# Patient Record
Sex: FEMALE | Race: WHITE
[De-identification: names, ages, dates, MRNs, and addresses within clinical notes are randomized per-mention and may not be internally consistent; named-entity substitution may affect disease eponyms.]

---

## 2018-07-25 ENCOUNTER — HOSPITAL ENCOUNTER (OUTPATIENT)
Dept: HOSPITAL 45 - C.ACU | Age: 73
Setting detail: OBSERVATION
LOS: 1 days | Discharge: HOME | End: 2018-07-26
Attending: INTERNAL MEDICINE | Admitting: INTERNAL MEDICINE
Payer: COMMERCIAL

## 2018-07-25 VITALS
SYSTOLIC BLOOD PRESSURE: 155 MMHG | DIASTOLIC BLOOD PRESSURE: 56 MMHG | OXYGEN SATURATION: 95 % | TEMPERATURE: 98.6 F | HEART RATE: 60 BPM

## 2018-07-25 VITALS
DIASTOLIC BLOOD PRESSURE: 69 MMHG | SYSTOLIC BLOOD PRESSURE: 186 MMHG | HEART RATE: 57 BPM | TEMPERATURE: 98.24 F | OXYGEN SATURATION: 97 %

## 2018-07-25 VITALS
SYSTOLIC BLOOD PRESSURE: 157 MMHG | OXYGEN SATURATION: 95 % | DIASTOLIC BLOOD PRESSURE: 57 MMHG | TEMPERATURE: 97.52 F | HEART RATE: 61 BPM

## 2018-07-25 VITALS
BODY MASS INDEX: 19.69 KG/M2 | WEIGHT: 132.94 LBS | WEIGHT: 132.94 LBS | HEIGHT: 69.02 IN | BODY MASS INDEX: 19.69 KG/M2 | HEIGHT: 69.02 IN | BODY MASS INDEX: 19.69 KG/M2 | BODY MASS INDEX: 19.69 KG/M2

## 2018-07-25 VITALS — DIASTOLIC BLOOD PRESSURE: 99 MMHG | HEART RATE: 56 BPM | SYSTOLIC BLOOD PRESSURE: 171 MMHG | OXYGEN SATURATION: 98 %

## 2018-07-25 VITALS — OXYGEN SATURATION: 95 % | SYSTOLIC BLOOD PRESSURE: 177 MMHG | DIASTOLIC BLOOD PRESSURE: 86 MMHG | HEART RATE: 59 BPM

## 2018-07-25 VITALS — OXYGEN SATURATION: 97 % | DIASTOLIC BLOOD PRESSURE: 72 MMHG | SYSTOLIC BLOOD PRESSURE: 190 MMHG | HEART RATE: 56 BPM

## 2018-07-25 VITALS — DIASTOLIC BLOOD PRESSURE: 55 MMHG | SYSTOLIC BLOOD PRESSURE: 125 MMHG | TEMPERATURE: 98.42 F | HEART RATE: 70 BPM

## 2018-07-25 VITALS
SYSTOLIC BLOOD PRESSURE: 182 MMHG | TEMPERATURE: 98.42 F | OXYGEN SATURATION: 97 % | DIASTOLIC BLOOD PRESSURE: 68 MMHG | HEART RATE: 72 BPM

## 2018-07-25 VITALS — DIASTOLIC BLOOD PRESSURE: 67 MMHG | HEART RATE: 68 BPM | OXYGEN SATURATION: 98 % | SYSTOLIC BLOOD PRESSURE: 183 MMHG

## 2018-07-25 DIAGNOSIS — I25.10: ICD-10-CM

## 2018-07-25 DIAGNOSIS — I96: ICD-10-CM

## 2018-07-25 DIAGNOSIS — E03.9: ICD-10-CM

## 2018-07-25 DIAGNOSIS — Z87.891: ICD-10-CM

## 2018-07-25 DIAGNOSIS — I10: ICD-10-CM

## 2018-07-25 DIAGNOSIS — Z95.1: ICD-10-CM

## 2018-07-25 DIAGNOSIS — E11.40: ICD-10-CM

## 2018-07-25 DIAGNOSIS — Z79.01: ICD-10-CM

## 2018-07-25 DIAGNOSIS — E78.5: ICD-10-CM

## 2018-07-25 DIAGNOSIS — L97.419: ICD-10-CM

## 2018-07-25 DIAGNOSIS — L97.919: ICD-10-CM

## 2018-07-25 DIAGNOSIS — I73.9: Primary | ICD-10-CM

## 2018-07-25 DIAGNOSIS — F03.90: ICD-10-CM

## 2018-07-25 DIAGNOSIS — I48.91: ICD-10-CM

## 2018-07-25 RX ADMIN — CILOSTAZOL SCH MG: 100 TABLET ORAL at 21:42

## 2018-07-25 RX ADMIN — MEMANTINE HYDROCHLORIDE SCH MG: 10 TABLET ORAL at 21:41

## 2018-07-25 RX ADMIN — INSULIN ASPART SCH EA: 100 INJECTION, SOLUTION INTRAVENOUS; SUBCUTANEOUS at 19:15

## 2018-07-25 RX ADMIN — INSULIN ASPART SCH EA: 100 INJECTION, SOLUTION INTRAVENOUS; SUBCUTANEOUS at 21:00

## 2018-07-25 RX ADMIN — DOCUSATE SODIUM SCH MG: 100 CAPSULE, LIQUID FILLED ORAL at 21:40

## 2018-07-25 RX ADMIN — SULFAMETHOXAZOLE AND TRIMETHOPRIM SCH TAB: 800; 160 TABLET ORAL at 21:42

## 2018-07-25 NOTE — POST SEDATION ASSESSMENT
Post Sedation Assessment


General


Date of Sedation


Jul 25, 2018.


Vital Signs:





Vital Signs Past 12 Hours








  Date Time  Temp Pulse Resp B/P (MAP) Pulse Ox O2 Delivery O2 Flow Rate FiO2


 


7/25/18 08:04 36.9 72 18 182/68 (106) 97 Room Air  











Post Procedure Recovery Score


Activity:  (2) Moves 4 extremities *


Respiration:  (2) Deep breath/cough


Circulation:  (2) +/-20% PreAnes Value


Consciousness:  (2) Fully Awake


Oxygen Saturation:  (2) > 92% On Room Air





Discharge Sedation


Level of Care:  Fast Track Phase II





Post Sedation Plan


On clinical assessment, the patient appears to have tolerated the sedation 

without complications. Patient is recovering as anticipated.





Patient will continue to be monitored by nursing and may be discharged when 

sedation discharge criteria are met per below protocol. 





Upon Completions of procedure and additional 15 minutes continue every 5 minute 

vital signs and the P.A.R. score; then discharge to a Phase I or Fast Track to 

Phase II per the following guidelines:





* Discharge Patient to appropriate Phase II area if PAR is 8 or greater or 

return to pre- procedure baseline.  The post - procedure orders will be as 

directed. 





* If PAR score is less than 8 or not return to pre-procedure baseline then 

patient will follow Phase I monitoring till PAR is reached for Phase II.  The 

Phase I may be done in procedure room or may call to secure a Phase I area.





* If naloxone or flumazenil are used for reversal, hold in Phase I for an 

additional 60 -120 minutes before discharge to Phase II.  Please call the 

Sedation Physician to re-evaluate and complete post-note for discharge to Phase 

II area. 





Do NOT discharge from procedure sedation or Phase 1 until post- sedation 

evaluation note is complete by procedure /sedation MD





Sedation Discharge Instructions to be given to the patient at discharge to home.

## 2018-07-25 NOTE — PRE SEDATION ASSESSMENT
Pre Sedation Assessment


General


Date of Sedation:


Jul 25, 2018.





Review


Cardiovascular:  regular rate, rhythm, no edema


Lungs:  chest non-tender, lungs clear





Pre-Sedation Airway Assessment


Smoking Status:  Former Smoker


Hx of Sleep Apnea:  No


Hx of difficult intubation:  No


Short Thick Neck:  No


Thyro-mental Distance:  > 3 Finger Breadths


Oral Cavity:  WNL


Mallampati Classification:  Class II


ASA Classification:  Class III





Procedure Planning


Contraindications for Sedation:  None


Current Medications Reviewed:  Yes





Notes








The planned sedation has been discussed with the patient. Informed Consent was 

obtained.  I have identified the patient, determined the appropriateness of 

sedation and have assessed the patient immediately prior to the procedure.  





All medicine(s) and interventions are by my order.

## 2018-07-25 NOTE — [facility] POST OPERATIVE BRIEF NOTE
Immediate Operative Summary


Operative Date


Jul 25, 2018.





Pre-Operative Diagnosis





Peripheral arterial disease





Post-Operative Diagnosis





Peripheral arterial disease





Procedure(s) Performed





Bilateral LE angiogram





Mechanical Closure of Right Femoral Artery





Surgeon


Cam





Assistant Surgeon(s)


None





Estimated Blood Loss


10





Findings


Consistent with Post-Op Diagnosis





Specimens





None





Drains


None





Anesthesia Type


IV Sedat Cons RN Only





Complication(s)


none





Disposition


Disposition:  Recovery Room / PACU

## 2018-07-26 VITALS
DIASTOLIC BLOOD PRESSURE: 67 MMHG | TEMPERATURE: 98.42 F | HEART RATE: 68 BPM | SYSTOLIC BLOOD PRESSURE: 178 MMHG | OXYGEN SATURATION: 94 %

## 2018-07-26 VITALS — SYSTOLIC BLOOD PRESSURE: 130 MMHG | DIASTOLIC BLOOD PRESSURE: 47 MMHG | HEART RATE: 64 BPM | OXYGEN SATURATION: 95 %

## 2018-07-26 VITALS
TEMPERATURE: 98.24 F | SYSTOLIC BLOOD PRESSURE: 130 MMHG | DIASTOLIC BLOOD PRESSURE: 47 MMHG | HEART RATE: 64 BPM | OXYGEN SATURATION: 95 %

## 2018-07-26 VITALS
OXYGEN SATURATION: 95 % | DIASTOLIC BLOOD PRESSURE: 50 MMHG | SYSTOLIC BLOOD PRESSURE: 142 MMHG | HEART RATE: 64 BPM | TEMPERATURE: 97.7 F

## 2018-07-26 VITALS — HEART RATE: 61 BPM | DIASTOLIC BLOOD PRESSURE: 58 MMHG | SYSTOLIC BLOOD PRESSURE: 165 MMHG | OXYGEN SATURATION: 95 %

## 2018-07-26 VITALS — OXYGEN SATURATION: 94 %

## 2018-07-26 VITALS
HEART RATE: 64 BPM | SYSTOLIC BLOOD PRESSURE: 155 MMHG | OXYGEN SATURATION: 95 % | DIASTOLIC BLOOD PRESSURE: 54 MMHG | TEMPERATURE: 98.24 F

## 2018-07-26 VITALS — SYSTOLIC BLOOD PRESSURE: 151 MMHG | OXYGEN SATURATION: 94 % | HEART RATE: 64 BPM | DIASTOLIC BLOOD PRESSURE: 52 MMHG

## 2018-07-26 VITALS — SYSTOLIC BLOOD PRESSURE: 143 MMHG | OXYGEN SATURATION: 95 % | DIASTOLIC BLOOD PRESSURE: 51 MMHG

## 2018-07-26 VITALS — SYSTOLIC BLOOD PRESSURE: 203 MMHG | HEART RATE: 70 BPM | DIASTOLIC BLOOD PRESSURE: 68 MMHG

## 2018-07-26 VITALS — OXYGEN SATURATION: 95 %

## 2018-07-26 LAB
BUN SERPL-MCNC: 14 MG/DL (ref 7–18)
CALCIUM SERPL-MCNC: 8.4 MG/DL (ref 8.5–10.1)
CO2 SERPL-SCNC: 29 MMOL/L (ref 21–32)
CREAT SERPL-MCNC: 0.79 MG/DL (ref 0.6–1.2)
EOSINOPHIL NFR BLD AUTO: 307 K/UL (ref 130–400)
GLUCOSE SERPL-MCNC: 154 MG/DL (ref 70–99)
HBA1C MFR BLD: 9.2 % (ref 4.5–5.6)
HCT VFR BLD CALC: 33.4 % (ref 37–47)
HGB BLD-MCNC: 10.5 G/DL (ref 12–16)
MCH RBC QN AUTO: 28.3 PG (ref 25–34)
MCHC RBC AUTO-ENTMCNC: 31.4 G/DL (ref 32–36)
MCV RBC AUTO: 90 FL (ref 80–100)
PMV BLD AUTO: 9.7 FL (ref 7.4–10.4)
POTASSIUM SERPL-SCNC: 4.1 MMOL/L (ref 3.5–5.1)
RED CELL DISTRIBUTION WIDTH CV: 17.3 % (ref 11.5–14.5)
RED CELL DISTRIBUTION WIDTH SD: 57.5 FL (ref 36.4–46.3)
SODIUM SERPL-SCNC: 135 MMOL/L (ref 136–145)
WBC # BLD AUTO: 5.74 K/UL (ref 4.8–10.8)

## 2018-07-26 RX ADMIN — INSULIN ASPART SCH EA: 100 INJECTION, SOLUTION INTRAVENOUS; SUBCUTANEOUS at 07:13

## 2018-07-26 RX ADMIN — MEMANTINE HYDROCHLORIDE SCH MG: 10 TABLET ORAL at 10:40

## 2018-07-26 RX ADMIN — INSULIN ASPART SCH EA: 100 INJECTION, SOLUTION INTRAVENOUS; SUBCUTANEOUS at 00:03

## 2018-07-26 RX ADMIN — DOCUSATE SODIUM SCH MG: 100 CAPSULE, LIQUID FILLED ORAL at 10:42

## 2018-07-26 RX ADMIN — INSULIN ASPART SCH EA: 100 INJECTION, SOLUTION INTRAVENOUS; SUBCUTANEOUS at 11:06

## 2018-07-26 RX ADMIN — SULFAMETHOXAZOLE AND TRIMETHOPRIM SCH TAB: 800; 160 TABLET ORAL at 10:40

## 2018-07-26 RX ADMIN — INSULIN ASPART SCH EA: 100 INJECTION, SOLUTION INTRAVENOUS; SUBCUTANEOUS at 04:08

## 2018-07-26 RX ADMIN — CILOSTAZOL SCH MG: 100 TABLET ORAL at 10:41

## 2018-07-26 NOTE — CARDIOLOGY FOLLOW-UP
Subjective


Subjective


Date of Service:


Jul 26, 2018.


Pt evaluation today including:  conversation w/ patient, physical exam, chart 

review, lab review, review of studies, review of inpatient medication list


Additional Details:


Modest pain in right upper extremity at ulnar access site.


No other new complaints.





Review of Systems


Constitutional:  No fever


Respiratory:  No cough


Abdomen:  No pain


Heme:  No abnormal bleeding/bruising


Skin:  No rash





Objective


Vital Signs





Last Vital Signs Documentation








  Date Time  Temp Pulse Resp B/P (MAP) Pulse Ox O2 Delivery O2 Flow Rate FiO2


 


7/26/18 07:25     94 Room Air  


 


7/26/18 06:06  70  203/68 (113)    


 


7/26/18 04:10 36.9  16     


 


7/25/18 15:18       4 











Physical Exam:


General Appearance:  no apparent distress


ENT:  pharynx normal


Respiratory/Chest:  lungs clear, normal breath sounds, no respiratory distress


Cardiovascular:  regular rate, rhythm, no gallop, no murmur


Abdomen:  normal bowel sounds, non tender, soft


Extremities:  non-tender, + slow capillary refill


Neurologic/Psychiatric:  alert, oriented x 3


Skin:  normal color, no rash, + pertinent finding


Lymphatic:  no adenopathy





Assessment and Plan


1. Critical limb ischemia


2. Right lower extremity ulcerations


3. Insulin dependent diabetes


4. Hypertension.





Plan for right lower extremity endovascular intervention today.  





Likely home this afternoon.


Medications:





Current Inpatient Medications








 Medications


  (Trade)  Dose


 Ordered  Sig/Amparo


 Route  Start Time


 Stop Time Status Last Admin


Dose Admin


 


 Dextrose  1,000 ml @ 


 50 mls/hr  Q20H


 IV  7/25/18 14:00


 8/24/18 13:59  7/25/18 19:17


50 MLS/HR


 


 Acetaminophen


  (Tylenol Tab)  1,000 mg  Q6  PRN


 PO  7/25/18 16:45


 8/24/18 16:44   


 


 


 Aspirin


  (Ecotrin Tab)  81 mg  DAILY


 PO  7/26/18 09:00


 8/25/18 08:59   


 


 


 Cilostazol


  (Pletal Tab)  100 mg  BID


 PO  7/25/18 21:00


 8/24/18 20:59  7/25/18 21:42


100 MG


 


 Citalopram


 Hydrobromide


  (celeXA TAB)  20 mg  DAILY


 PO  7/26/18 09:00


 8/25/18 08:59   


 


 


 Docusate Sodium


  (coLACE CAP)  100 mg  BID


 PO  7/25/18 21:00


 8/24/18 20:59  7/25/18 21:40


100 MG


 


 Donepezil HCl


  (Aricept Tab)  10 mg  DAILY


 PO  7/26/18 09:00


 8/25/18 08:59   


 


 


 Levothyroxine


 Sodium


  (Synthroid Tab)  88 mcg  DAILYBB


 PO  7/26/18 06:00


 8/25/18 05:59  7/26/18 05:56


88 MCG


 


 Memantine


  (Namenda Tab)  10 mg  BID


 PO  7/25/18 21:00


 8/24/18 20:59  7/25/18 21:41


10 MG


 


 Metoprolol


 Succinate


  (Toprol Xl Tab)  25 mg  DAILY


 PO  7/26/18 09:00


 8/25/18 08:59   


 


 


 Multivitamins


  (Multivitamin


 Tab)  1 tab  DAILY


 PO  7/26/18 09:00


 8/25/18 08:59   


 


 


 Pantoprazole


 Sodium


  (Protonix Tab)  40 mg  DAILY


 PO  7/26/18 09:00


 8/25/18 08:59   


 


 


 Simvastatin


  (Zocor Tab)  20 mg  HS


 PO  7/25/18 21:00


 8/24/18 20:59  7/25/18 21:40


20 MG


 


 Trimethoprim/


 Sulfamethoxazole


  (Septra Ds 800/


 160MG Tab)  1 tab  BID


 PO  7/25/18 21:00


 8/4/18 20:59  7/25/18 21:42


1 TAB


 


 Glucose


  (Glucose 40% Gel)  15-30


 GRAMS 15


 GRAMS...  UD  PRN


 PO  7/25/18 16:45


 8/24/18 16:44   


 


 


 Glucose


  (Glucose Chew


 Tab)  4-8


 Tablets 4


 Tabl...  UD  PRN


 PO  7/25/18 16:45


 8/24/18 16:44   


 


 


 Dextrose


  (Dextrose 50%


 50ML Syringe)  25-50ML


 25ML FOR


 ...  UD  PRN


 IV  7/25/18 16:45


 8/24/18 16:44   


 


 


 Glucagon


  (Glucagon Inj)  1 mg  UD  PRN


 SQ  7/25/18 16:45


 8/24/18 16:44   


 


 


 Carbohydrates


  (Carbohydrates


 For Hypoglycemia)  15-30 GRAMS


 15 grams if


 BSG 54-69...  UD  PRN


 PO  7/25/18 16:45


 8/24/18 16:44   


 


 


 Miscellaneous


 Information


  (Consult


 Glycemic


 Management


 Pharmacy)  1 ea  UD  PRN


 N/A  7/25/18 17:16


 8/24/18 17:15   


 


 


 Insulin Aspart


  (novoLOG INSULIN


 PUMP)  1 ea  ACHS


 N/A  7/25/18 18:30


 8/24/18 18:29  7/26/18 07:13


1 EA


 


 Insulin Aspart


  (novoLOG ASPART)  SLIDING


 SCALE  PRN  PRN


 SC  7/25/18 18:45


 8/24/18 18:44   


 


 


 Miscellaneous


  (Iv Fluids


 Completed)  1 ea  PRN  PRN


 N/A  7/25/18 19:15


 7/25/19 19:14   


 








Lab Results:


7/26/18 05:54








7/26/18 05:54

















Test


  7/26/18


05:54 7/26/18


07:13


 


Red Blood Count


  3.71 M/uL


(4.2-5.4) 


 


 


Mean Corpuscular Volume


  90.0 fL


() 


 


 


Mean Corpuscular Hemoglobin


  28.3 pg


(25-34) 


 


 


Mean Corpuscular Hemoglobin


Concent 31.4 g/dl


(32-36) 


 


 


RDW Standard Deviation


  57.5 fL


(36.4-46.3) 


 


 


RDW Coefficient of Variation


  17.3 %


(11.5-14.5) 


 


 


Mean Platelet Volume


  9.7 fL


(7.4-10.4) 


 


 


Anion Gap


  5.0 mmol/L


(3-11) 


 


 


Est Creatinine Clear Calc


Drug Dose 58.8 ml/min 


  


 


 


Estimated GFR (


American) 84.9 


  


 


 


Estimated GFR (Non-


American 73.2 


  


 


 


BUN/Creatinine Ratio 18.0 (10-20)  


 


Estimated Average Glucose 217 mg/dl  


 


Hemoglobin A1c


  9.2 %


(4.5-5.6) 


 


 


Calcium Level


  8.4 mg/dl


(8.5-10.1) 


 


 


Bedside Glucose


  


  164 mg/dl


(70-90)

## 2018-07-26 NOTE — [facility] OPERATIVE REPORT
Operative Report


Operative Date


Jul 25, 2018.





Pre-Operative Diagnosis





Peripheral arterial disease





Post-Operative Diagnosis





Peripheral arterial disease





Procedure(s) Performed





Bilateral LE angiogram





Mechanical Closure of Right Femoral Artery





Surgeon


Cam





Assistant Surgeon(s)


None





Estimated Blood Loss


10





Findings


Left lower extremity:


Common iliac - Heavily calcified, mild disease


External iliac - Heavily calcified, mild disease


Internal iliac - Heavily calcified, mild disease


CFA - No significant stenosis at anastomosis with graft


Profunda - Heavily calcified, mild disease


SFA  - Occluded ostial to distal segment stents.


Femoral to anterior tibial graft patent.  Limited flow distal to anastomosis.  





Right lower extremity:


Common iliac - Heavily calcified, mild disease


External iliac - Heavily calcified, mild disease


Internal iliac - Heavily calcified, mild disease


CFA - Heavily calcified, mild disease


Profunda - Heavily calcified, mild disease


SFA  - Small vessel, heavily calcified with severe focal distal disease (90%)


Popliteal - Heavily calcified with 95% mid stenosis.


TPT - Heavily calcified with mild diffuse disease


AT - Occluded proximally


PT - Severe diffuse disease, tapering/occluded in the mid segment


Peroneal - Patent to the foot with mild diffuse disease





Specimens





None





Drains


None





Anesthesia Type


IV Sedat Cons RN Only





Complication(s)


none





Disposition


Recovery Room / PACU





Description of Procedure


Ultrasound guided access of right ulnar artery with 5Fr sheath


Able to pass 0.35 wire to aorta but could not pass 4Fr catheter into brachial 

artery


Converted to right CFA approach, 5Fr sheath placed.


Aorta angiogram, bilateral iliac angiograms completed with pigtail catheter


RIM catheter placed into external iliac to visualize LLE iliac


RLE angiograms via right CFA sheath.





Right CFA closure with Mynx device.





Contrast: 50 cc





Summary:


1. Severe right lower extremity distal SFA, popliteal disease.  Single vessel 

peroneal run-off to the foot.  


2. Patent left lower extremity femoral to AT bypass graft with severe distal 

disease post anastomosis.


I attest to the content of the Intraoperative Record and any orders documented 

therein.  Any exceptions are noted below.

## 2018-07-26 NOTE — DISCHARGE INSTRUCTIONS
Discharge Instructions


Procedure


Procedure Date:


Jul 26, 2018.


Reason for Visit:


PAD.





Discharge


Discharge Date:


Jul 26, 2018.


Discharge Diagnosis:


Peripheral artery disease


Last Recorded Wt (Kilograms):  60.300





Anesthesia


Post Anesthesia Instructions:


If you have had General Anesthesia or IV Sedation:





*  Do not drive today.





*  Resume driving when surgeon permits.





*  Do not make important decisions or sign legal documents today.





*  Call surgeon for:


   1.  Temperature elevations greater than 101 degrees F.


   2.  Uncontrollable pain.


   3.  Excessive bleeding.


   4.  Persistent nausea and vomiting.


   5.  Medication intolerance (nausea, vomiting or rash).





*  For nausea and vomiting use only clear liquids such as: tea, soda, bouillon 

until


   nausea subsides, then gradually increase diet as tolerated.





*  If you have any concerns or questions, call your surgeon's office.  If 

physician is 


   unavailable and it is an emergency, call 911 or go to the nearest emergency 

room.





Instructions


Activity Recommendations:  limitations as noted below


Recommended Home Diet:  resume previous diet


Allergies:  


Coded Allergies:  


     Penicillins (Unverified  Allergy, Mild, SHORTNESS OF BREATH, 7/25/18)


     Clopidogrel (Unverified  Allergy, Unknown, RASH, 7/25/18)





Follow Up


Additional Instructions:


ACTIVITY RECOMMENDATIONS:





It is common to feel weak and fatigue for a few days.





*  Do not drive or operate any motorized equipment for the next


    three days.





*  Limit stair usage (2 or 3 trips a day only) for the next three days.





*  Do not lift anything heavier than 10 pounds for the next three


    days.





*  Do not engage in vigorous exercise or any sports for the next


    five days.





*  You may shower the day after your procedure, but do not 


    immerse the area for three days.  Cleanse the site gently with


    soap and water.








SPECIAL CARE INSTRUCTIONS:





* You may replace the pressure dressing or band-aid the morning 


after the procedure.





* After your procedure, it is normal to have a small bruise or small lump


at the site.  Examine your site daily for any change in the bruise or lump,


redness, swelling, drainage or numbness.  


Notify your doctor if any change.





BLEEDING:





* If there is a small amount of bleeding at the site, lie down and apply


firm pressure with a clean cloth for ten minutes.  When the bleeding stops,


lie quietly keeping the procedure limb straight for six hours.  


Notify your doctor as soon as possible.





* If the bleeding does not stop after ten minutes or if there is a large


amount of bleeding or spurting, call 911 immediately.  Continue to lie 


down and hold firm pressure until help arrives.





SKIN IRRITATION:





*  You may experience some redness and/or swelling in the area where radiation 

was


administered.  If any skin irritation occurs, please contact your family 

physician.








FOLLOW UP VISIT:





Keep any scheduled doctor appointments.


Follow-up with:


Dr. Levy 3-4 weeks.





Continue to follow with wound clinic





[facility] Recommendations:


 


Call your doctor if:





*  Temperature above 101 degrees


*  Pain not relieved by pain medicine ordered


*  There is increased drainage or redness from any incision


*  You have any unanswered questions or concerns.





Your Doctors Instructions noted above were prepared by provider Irving Levy.


Patient Signature Section:





 Patient Instructions Signature Page














Francisca Reyes 











Patient (or Guardian) Signature/Date:____________________________________ I 

have read and understand the instructions given to me by my caregivers.








Caregiver/RN/Doctor Signature/Date:____________________________________











The above-named patient and/or guardian has received patient instructions on 

this date.





























+  Original Patient Signature Page (only) stays with chart.  Please make copy 

for patient.

## 2018-07-26 NOTE — POST SEDATION ASSESSMENT
Post Sedation Assessment


General


Date of Sedation


Jul 26, 2018.


Vital Signs:





Vital Signs Past 12 Hours








  Date Time  Temp Pulse Resp B/P (MAP) Pulse Ox O2 Delivery O2 Flow Rate FiO2


 


7/26/18 07:25     94 Room Air  


 


7/26/18 06:06  70  203/68 (113)    


 


7/26/18 04:10 36.9 68 16 178/67 (104) 94 Room Air  


 


7/25/18 23:59      Room Air  


 


7/25/18 23:38 36.9 70 16 125/55 (78)    











Post Procedure Recovery Score


Activity:  (2) Moves 4 extremities *


Respiration:  (2) Deep breath/cough


Circulation:  (2) +/-20% PreAnes Value


Consciousness:  (2) Fully Awake


Oxygen Saturation:  (2) > 92% On Room Air


Post Anesthesia Score:  10





Discharge Sedation


Level of Care:  Fast Track Phase II





Post Sedation Plan


On clinical assessment, the patient appears to have tolerated the sedation 

without complications. Patient is recovering as anticipated.





Patient will continue to be monitored by nursing and may be discharged when 

sedation discharge criteria are met per below protocol. 





Upon Completions of procedure and additional 15 minutes continue every 5 minute 

vital signs and the P.A.R. score; then discharge to a Phase I or Fast Track to 

Phase II per the following guidelines:





* Discharge Patient to appropriate Phase II area if PAR is 8 or greater or 

return to pre- procedure baseline.  The post - procedure orders will be as 

directed. 





* If PAR score is less than 8 or not return to pre-procedure baseline then 

patient will follow Phase I monitoring till PAR is reached for Phase II.  The 

Phase I may be done in procedure room or may call to secure a Phase I area.





* If naloxone or flumazenil are used for reversal, hold in Phase I for an 

additional 60 -120 minutes before discharge to Phase II.  Please call the 

Sedation Physician to re-evaluate and complete post-note for discharge to Phase 

II area. 





Do NOT discharge from procedure sedation or Phase 1 until post- sedation 

evaluation note is complete by procedure /sedation MD





Sedation Discharge Instructions to be given to the patient at discharge to home.

## 2018-07-26 NOTE — [facility] POST OPERATIVE BRIEF NOTE
Immediate Operative Summary


Operative Date


Jul 26, 2018.





Pre-Operative Diagnosis





Peripheral Artery Disease





Post-Operative Diagnosis





Peripheral Artery Disease





Procedure(s) Performed





Right Lower Extremity Angiogram, Percutaneous Transluminal Angioplasty of 

Distal 


SFA and Popliteal, Mechanical Closure of Right Femoral Artery, Moderate 

Sedation 


From 0838 to





Surgeon


Dr. Levy





Assistant Surgeon(s)


None





Estimated Blood Loss


22





Findings


Consistent with Post-Op Diagnosis





Specimens





None





Drains


None





Anesthesia Type


IV Sedat Cons RN Only





Complication(s)


none





Disposition


Accompanied Pt To Recover:  no


Disposition:  PCU

## 2018-07-26 NOTE — PRE SEDATION ASSESSMENT
Pre Sedation Assessment


General


Date of Sedation:


Jul 26, 2018.





Vital Signs Past 12 Hours








  Date Time  Temp Pulse Resp B/P (MAP) Pulse Ox O2 Delivery O2 Flow Rate FiO2


 


7/26/18 07:25     94 Room Air  


 


7/26/18 06:06  70  203/68 (113)    


 


7/26/18 04:10 36.9 68 16 178/67 (104) 94 Room Air  


 


7/25/18 23:59      Room Air  


 


7/25/18 23:38 36.9 70 16 125/55 (78)    











Review


Cardiovascular:  regular rate, rhythm, no edema


Lungs:  chest non-tender, lungs clear





Pre-Sedation Airway Assessment


Smoking Status:  Former Smoker


Hx of Sleep Apnea:  No


Hx of difficult intubation:  No


Short Thick Neck:  No


Thyro-mental Distance:  > 3 Finger Breadths


Oral Cavity:  Dentures


Mallampati Classification:  Class I


ASA Classification:  Class II





NPO Status


Date of Last Intake of Fluids:  Jul 25, 2018


Time of Last Intake of Fluids:  2345


Date of Last Intake of Solids:  Jul 25, 2018


Time of Last Intake of Solids:  2200





Procedure Planning


Contraindications for Sedation:  None


Current Medications Reviewed:  Yes





Notes








The planned sedation has been discussed with the patient. Informed Consent was 

obtained.  I have identified the patient, determined the appropriateness of 

sedation and have assessed the patient immediately prior to the procedure.  





All medicine(s) and interventions are by my order.

## 2018-08-02 ENCOUNTER — HOSPITAL ENCOUNTER (INPATIENT)
Dept: HOSPITAL 45 - C.EDB | Age: 73
LOS: 17 days | Discharge: HOME | DRG: 256 | End: 2018-08-19
Attending: HOSPITALIST | Admitting: HOSPITALIST
Payer: COMMERCIAL

## 2018-08-02 VITALS
HEART RATE: 63 BPM | DIASTOLIC BLOOD PRESSURE: 54 MMHG | OXYGEN SATURATION: 93 % | SYSTOLIC BLOOD PRESSURE: 149 MMHG | TEMPERATURE: 98.06 F

## 2018-08-02 VITALS
HEIGHT: 69 IN | WEIGHT: 138.89 LBS | WEIGHT: 138.89 LBS | HEIGHT: 69 IN | BODY MASS INDEX: 20.57 KG/M2 | BODY MASS INDEX: 20.57 KG/M2

## 2018-08-02 VITALS — HEART RATE: 64 BPM | TEMPERATURE: 98.06 F | DIASTOLIC BLOOD PRESSURE: 65 MMHG | SYSTOLIC BLOOD PRESSURE: 183 MMHG

## 2018-08-02 DIAGNOSIS — Z79.899: ICD-10-CM

## 2018-08-02 DIAGNOSIS — Z95.1: ICD-10-CM

## 2018-08-02 DIAGNOSIS — Z86.14: ICD-10-CM

## 2018-08-02 DIAGNOSIS — F01.50: ICD-10-CM

## 2018-08-02 DIAGNOSIS — E10.51: ICD-10-CM

## 2018-08-02 DIAGNOSIS — G47.33: ICD-10-CM

## 2018-08-02 DIAGNOSIS — E10.69: ICD-10-CM

## 2018-08-02 DIAGNOSIS — E10.42: ICD-10-CM

## 2018-08-02 DIAGNOSIS — Z89.412: ICD-10-CM

## 2018-08-02 DIAGNOSIS — E03.9: ICD-10-CM

## 2018-08-02 DIAGNOSIS — I25.10: ICD-10-CM

## 2018-08-02 DIAGNOSIS — E10.22: ICD-10-CM

## 2018-08-02 DIAGNOSIS — I48.0: ICD-10-CM

## 2018-08-02 DIAGNOSIS — E10.649: ICD-10-CM

## 2018-08-02 DIAGNOSIS — R91.8: ICD-10-CM

## 2018-08-02 DIAGNOSIS — L97.419: ICD-10-CM

## 2018-08-02 DIAGNOSIS — Z79.01: ICD-10-CM

## 2018-08-02 DIAGNOSIS — Z79.82: ICD-10-CM

## 2018-08-02 DIAGNOSIS — Z88.0: ICD-10-CM

## 2018-08-02 DIAGNOSIS — Z16.24: ICD-10-CM

## 2018-08-02 DIAGNOSIS — Z88.8: ICD-10-CM

## 2018-08-02 DIAGNOSIS — Z98.62: ICD-10-CM

## 2018-08-02 DIAGNOSIS — K21.9: ICD-10-CM

## 2018-08-02 DIAGNOSIS — M86.171: ICD-10-CM

## 2018-08-02 DIAGNOSIS — L97.514: ICD-10-CM

## 2018-08-02 DIAGNOSIS — Z66: ICD-10-CM

## 2018-08-02 DIAGNOSIS — B96.5: ICD-10-CM

## 2018-08-02 DIAGNOSIS — E10.65: ICD-10-CM

## 2018-08-02 DIAGNOSIS — E10.52: Primary | ICD-10-CM

## 2018-08-02 DIAGNOSIS — E10.621: ICD-10-CM

## 2018-08-02 DIAGNOSIS — Z79.02: ICD-10-CM

## 2018-08-02 DIAGNOSIS — N18.3: ICD-10-CM

## 2018-08-02 DIAGNOSIS — I48.92: ICD-10-CM

## 2018-08-02 DIAGNOSIS — E78.5: ICD-10-CM

## 2018-08-02 DIAGNOSIS — Z82.49: ICD-10-CM

## 2018-08-02 DIAGNOSIS — Z87.891: ICD-10-CM

## 2018-08-02 DIAGNOSIS — B95.62: ICD-10-CM

## 2018-08-02 DIAGNOSIS — L03.115: ICD-10-CM

## 2018-08-02 DIAGNOSIS — Z79.2: ICD-10-CM

## 2018-08-02 DIAGNOSIS — I12.9: ICD-10-CM

## 2018-08-02 LAB
ALBUMIN SERPL-MCNC: 2.5 GM/DL (ref 3.4–5)
ALP SERPL-CCNC: 154 U/L (ref 45–117)
ALT SERPL-CCNC: 25 U/L (ref 12–78)
AST SERPL-CCNC: 24 U/L (ref 15–37)
BASOPHILS # BLD: 0.04 K/UL (ref 0–0.2)
BASOPHILS NFR BLD: 0.6 %
BUN SERPL-MCNC: 25 MG/DL (ref 7–18)
CALCIUM SERPL-MCNC: 9 MG/DL (ref 8.5–10.1)
CO2 SERPL-SCNC: 32 MMOL/L (ref 21–32)
CREAT SERPL-MCNC: 1.1 MG/DL (ref 0.6–1.2)
EOS ABS #: 0.16 K/UL (ref 0–0.5)
EOSINOPHIL NFR BLD AUTO: 364 K/UL (ref 130–400)
GLUCOSE SERPL-MCNC: 386 MG/DL (ref 70–99)
HCT VFR BLD CALC: 31.9 % (ref 37–47)
HGB BLD-MCNC: 10.4 G/DL (ref 12–16)
IG#: 0.04 K/UL (ref 0–0.02)
IMM GRANULOCYTES NFR BLD AUTO: 18.7 %
INR PPP: 1.1 (ref 0.9–1.1)
LYMPHOCYTES # BLD: 1.34 K/UL (ref 1.2–3.4)
MCH RBC QN AUTO: 29.9 PG (ref 25–34)
MCHC RBC AUTO-ENTMCNC: 32.6 G/DL (ref 32–36)
MCV RBC AUTO: 91.7 FL (ref 80–100)
MONO ABS #: 0.72 K/UL (ref 0.11–0.59)
MONOCYTES NFR BLD: 10.1 %
NEUT ABS #: 4.85 K/UL (ref 1.4–6.5)
NEUTROPHILS # BLD AUTO: 2.2 %
NEUTROPHILS NFR BLD AUTO: 67.8 %
PMV BLD AUTO: 10.1 FL (ref 7.4–10.4)
POTASSIUM SERPL-SCNC: 4.8 MMOL/L (ref 3.5–5.1)
PROT SERPL-MCNC: 7 GM/DL (ref 6.4–8.2)
PTT PATIENT: 30.3 SECONDS (ref 21–31)
RED CELL DISTRIBUTION WIDTH CV: 17.9 % (ref 11.5–14.5)
RED CELL DISTRIBUTION WIDTH SD: 59.8 FL (ref 36.4–46.3)
SODIUM SERPL-SCNC: 133 MMOL/L (ref 136–145)
WBC # BLD AUTO: 7.15 K/UL (ref 4.8–10.8)

## 2018-08-02 RX ADMIN — DAPTOMYCIN SCH MLS/MIN: 50 INJECTION, POWDER, LYOPHILIZED, FOR SOLUTION INTRAVENOUS at 23:11

## 2018-08-02 RX ADMIN — CILOSTAZOL SCH MG: 100 TABLET ORAL at 21:55

## 2018-08-02 RX ADMIN — INSULIN ASPART SCH UNITS: 100 INJECTION, SOLUTION INTRAVENOUS; SUBCUTANEOUS at 21:50

## 2018-08-02 RX ADMIN — ROSUVASTATIN CALCIUM SCH MG: 20 TABLET, FILM COATED ORAL at 21:53

## 2018-08-02 RX ADMIN — DOCUSATE SODIUM SCH MG: 100 CAPSULE, LIQUID FILLED ORAL at 21:53

## 2018-08-02 RX ADMIN — METOPROLOL TARTRATE SCH MG: 25 TABLET, FILM COATED ORAL at 21:54

## 2018-08-02 RX ADMIN — MEMANTINE HYDROCHLORIDE SCH MG: 10 TABLET ORAL at 21:54

## 2018-08-02 RX ADMIN — MIRTAZAPINE SCH MG: 15 TABLET, FILM COATED ORAL at 21:55

## 2018-08-02 RX ADMIN — INSULIN ASPART SCH EA: 100 INJECTION, SOLUTION INTRAVENOUS; SUBCUTANEOUS at 21:00

## 2018-08-02 RX ADMIN — APIXABAN SCH MG: 5 TABLET, FILM COATED ORAL at 21:54

## 2018-08-02 NOTE — HISTORY AND PHYSICAL
History & Physical


Date & Time of Service:


Aug 2, 2018 at 18:19


Chief Complaint:


Infected Foot


Primary Care Physician:


Jhonathan Mallory M.D.


History of Present Illness


Source:  patient, family (Daughter Rupinder)


This is a 73-year-old female with a significant past medical history of type 1 

insulin-dependent diabetes mellitus, CAD status post prior CABG, PAD status 

post recent revascularization, hypothyroidism, hypertension, hyperlipidemia, PAF

, diabetic polyneuropathy, GERD, CKD stage III who presents to [facility] where she was referred from ID Dr. Stone secondary to MDR wound 

culture requiring IV antibiotics.  Patient was initially hospitalized in March 2018 requiring complex fem-tibial bypass.  Most recently hospitalized on 7/26/ 2018 due to bilateral lower extremity angiogram with right SFA/popliteal 

angioplasty with drug-eluting balloon by Dr. Levy which was successful.  She 

has chronic right heel wound, great toe necrosis which warranted 

revascularization prior to possible further procedures per daughter.  She 

follows closely with infectious disease and wound clinic.  Wound culture of 

right heel grew positive MRSA, multidrug resistant Pseudomonas requiring IV 

antibiotic therapy, therefore she is referred for admission.  Daughter is 

present, provides most of history.  Currently patient denies pain, fever, chills

, sweats, chest pain, shortness of breath, dizziness, lightheadedness, nausea, 

vomiting, diarrhea, UTI sx.  Overall good appetite, in which she takes remeron 

for.  Per daughter patient has been having elevated blood sugars secondary to 

frequent hospitalizations, infections.  She states "I just think she needs this 

toe off and be done with it."  In ED initial evaluation revealed WBC 7.15, 

hemoglobin 10.4, hematocrit 31.9, platelets 264, sodium 133, potassium 4.8, 

chloride 96, BUN/creatinine 25, 1.10, glucose 386.  She was given a dose of IV 

Vanco, IV gentamicin, 10 units of IV regular insulin secondary to 

hyperglycemia.  ESR, CRP elevated at 79 and 4.8 respectively.  Her lactic acid 

was 1.5.  CT scan right foot revealed soft tissue edema, no evidence of 

osteomyelitis.  She is being admitted for IV antibiotic therapy.





Past Medical/Surgical History


Medical Problems:


(1) Atherosclerotic dementia


Status: Chronic  





(2) CAD (coronary artery disease)


Status: Chronic  





(3) Charcot foot due to diabetes mellitus


Status: Chronic  





(4) Diabetic peripheral neuropathy associated with type 1 diabetes mellitus


Status: Chronic  





(5) Diabetic polyneuropathy


Status: Chronic  





(6) GERD (gastroesophageal reflux disease)


Status: Chronic  





(7) History of diabetic ulcer of foot


Status: Chronic  





(8) Hyperlipidemia


Status: Chronic  





(9) Hypertension


Status: Chronic  





(10) Hypothyroid


Status: Chronic  





(11) Paroxysmal atrial fibrillation


Status: Chronic  





(12) Peripheral vascular angioplasty status


Permanent Comment: Right SFA/popliteal angioplasty with drug-eluting balloon 7/ 26/18


Status: Chronic  





(13) Severe peripheral arterial disease


Status: Chronic  





(14) Status post partial amputation of foot


Status: Chronic  





(15) Type 1 diabetes mellitus


Status: Chronic  





(16) Type 1 DM with CKD stage 3 and hypertension


Status: Chronic  





Surgical Problems:


(1) Amputated great toe of left foot


Permanent Comment: left great, MRSA Osteo 2/2010


Status: Chronic  





(2) Hx of appendectomy


Status: Chronic  





(3) Hx of CABG


Permanent Comment: 2012


Status: Chronic  





(4) S/P laparoscopic hysterectomy


Status: Chronic  





(5) Status post femorotibial bypass


Status: Chronic  








Family History





Bone cancer


  MOTHER


FH: COPD (chronic obstructive pulmonary disease)


  FATHER


Peripheral arterial disease


  FATHER





Social History


Smoking Status:  Former Smoker (2 packs per day for 30 years, quit in 1981)


Smokeless Tobacco Use:  No


Alcohol Use:  none


Drug Use:  none


Marital Status:  single, 


Housing status:  lives with family (Her daughter Rupinder)


Occupational Status:  retired





Immunizations


History of Influenza Vaccine:  Unknown


History of Tetanus Vaccine?:  Yes


Tetanus Immunization Date:  Mar 27, 2014


History of Pneumococcal:  Yes


Pneumococcal Date:  May 26, 2016





Allergies


Coded Allergies:  


     Penicillins (Unverified  Allergy, Mild, SHORTNESS OF BREATH, 8/2/18)


     Clopidogrel (Unverified  Allergy, Unknown, RASH, 8/2/18)





Home Medications


Scheduled


Amlodipine (Norvasc), 2.5 MG PO DAILY


Apixaban (Eliquis), 5 MG PO BID


Aspirin (Aspirin Ec), 81 MG PO DAILY


Calcium Carbonate-Vitamin D (Calcium + D), 1 TAB PO DAILY


Cilostazol (Pletal), 1 TAB PO BID


Citalopram Hydrobromide (Celexa), 20 MG PO DAILY


Docusate Sodium (Colace), 1 CAP PO BID


Donepezil Hydrochloride (Aricept), 10 MG PO DAILY


Furosemide (Lasix), 1 TAB PO DAILY


Insulin Aspart (novoLOG INSULIN PUMP ), 1 EA N/A UD


Levothyroxine Sodium (Synthroid), 88 MCG PO DAILY


Memantine Hcl (Namenda), 10 MG PO BID


Metoprolol Tartrate (Lopressor), 25 MG PO BID


Mirtazapine (Remeron), 0.5 TAB PO HS


Multiple Vitamin (Multivitamin), 1 TAB PO DAILY


Pantoprazole (Protonix), 40 MG PO DAILY


Polyethylene Glycol 3350 (Miralax), 17 GM PO DAILY


Sulfa/Trimethoprim (Bactrim Ds 800MG/160MG), 1 TAB PO BID





Scheduled PRN


Acetaminophen (Tylenol), 1,000 MG PO Q6 PRN for Pain





Miscellaneous Medications


Rosuvastatin Calcium (Crestor), 20





Review of Systems


As noted per HPI, 10 systems reviewed and negative unless noted above.





Physical Exam


Vital Signs











  Date Time  Temp Pulse Resp B/P (MAP) Pulse Ox O2 Delivery O2 Flow Rate FiO2


 


8/2/18 15:53 36.8 64  208/62 96 Room Air  





    174/117    


 


8/2/18 14:05 36.8 69 20 146/84 92 Room Air  








General Appearance:  no apparent distress, + thin (F, appears age, lying in bed)


Head:  normocephalic, atraumatic


Eyes:  normal inspection, PERRL


ENT:  normal ENT inspection, hearing grossly normal, + pertinent finding (

Mucous membranes moist)


Neck:  supple, no adenopathy


Respiratory/Chest:  chest non-tender, lungs clear, normal breath sounds, no 

respiratory distress, no accessory muscle use


Cardiovascular:  regular rate, rhythm, + systolic murmur (2/6 holosystolic 

heard best at apex, radiates to carotid), + abnormal peripheral pulses (

Diminished pedal pulses and PT pulses R>L, )


Abdomen/GI:  normal bowel sounds, non tender, soft, no organomegaly


Genitourinary - Female:  + pertinent finding (Left inginual scar from skin/

muscle graft healed; right inguinal vascular access site, CDI no erythema)


Back:  normal inspection, normal range of motion


Extremities/Musculoskelatal:  + pedal edema (Right with warmth to touch, mild 

erythema), + pertinent finding (+Right heel ulcer with treatment intact, R 

necrotic great toe down to base, with minimal serosanginous drainage at base.)


Neurologic/Psych:  alert, normal mood/affect, oriented x 3 (to basics only)


Skin:  normal color, no rash





Diagnostics


Laboratory Results





Results Past 24 Hours








Test


  8/2/18


15:45 8/2/18


16:35 Range/Units


 


 


White Blood Count 7.15  4.8-10.8  K/uL


 


Red Blood Count 3.48  4.2-5.4  M/uL


 


Hemoglobin 10.4  12.0-16.0  g/dL


 


Hematocrit 31.9  37-47  %


 


Mean Corpuscular Volume 91.7    fL


 


Mean Corpuscular Hemoglobin 29.9  25-34  pg


 


Mean Corpuscular Hemoglobin


Concent 32.6


  


  32-36  g/dl


 


 


Platelet Count 364  130-400  K/uL


 


Mean Platelet Volume 10.1  7.4-10.4  fL


 


Neutrophils (%) (Auto) 67.8   %


 


Lymphocytes (%) (Auto) 18.7   %


 


Monocytes (%) (Auto) 10.1   %


 


Eosinophils (%) (Auto) 2.2   %


 


Basophils (%) (Auto) 0.6   %


 


Neutrophils # (Auto) 4.85  1.4-6.5  K/uL


 


Lymphocytes # (Auto) 1.34  1.2-3.4  K/uL


 


Monocytes # (Auto) 0.72  0.11-0.59  K/uL


 


Eosinophils # (Auto) 0.16  0-0.5  K/uL


 


Basophils # (Auto) 0.04  0-0.2  K/uL


 


RDW Standard Deviation 59.8  36.4-46.3  fL


 


RDW Coefficient of Variation 17.9  11.5-14.5  %


 


Immature Granulocyte % (Auto) 0.6   %


 


Immature Granulocyte # (Auto) 0.04  0.00-0.02  K/uL


 


Erythrocyte Sedimentation Rate 79  0-21  mm/hr


 


Prothrombin Time


  11.2


  


  9.0-12.0


SECONDS


 


Prothromb Time International


Ratio 1.1


  


  0.9-1.1  


 


 


Activated Partial


Thromboplast Time 30.3


  


  21.0-31.0


SECONDS


 


Partial Thromboplastin Ratio 1.2   


 


Sodium Level 133  136-145  mmol/L


 


Potassium Level 4.8  3.5-5.1  mmol/L


 


Chloride Level 96    mmol/L


 


Carbon Dioxide Level 32  21-32  mmol/L


 


Anion Gap 5.0  3-11  mmol/L


 


Blood Urea Nitrogen 25  7-18  mg/dl


 


Creatinine


  1.10


  


  0.60-1.20


mg/dl


 


Est Creatinine Clear Calc


Drug Dose 41.9


  


   ml/min


 


 


Estimated GFR (


American) 56.9


  


   


 


 


Estimated GFR (Non-


American 49.1


  


   


 


 


BUN/Creatinine Ratio 22.8  10-20  


 


Random Glucose 386  70-99  mg/dl


 


Lactic Acid Level 1.5  0.4-2.0  mmol/L


 


Calcium Level 9.0  8.5-10.1  mg/dl


 


Total Bilirubin 0.2  0.2-1  mg/dl


 


Aspartate Amino Transf


(AST/SGOT) 24


  


  15-37  U/L


 


 


Alanine Aminotransferase


(ALT/SGPT) 25


  


  12-78  U/L


 


 


Alkaline Phosphatase 154    U/L


 


C-Reactive Protein 4.22  0-0.29  mg/dl


 


Total Protein 7.0  6.4-8.2  gm/dl


 


Albumin 2.5  3.4-5.0  gm/dl


 


Globulin 4.5  2.5-4.0  gm/dl


 


Albumin/Globulin Ratio 0.6  0.9-2  


 


Beta-Hydroxybutyric Acid 1.21  0.2-2.81  mg/dL


 


Urine Color  YELLOW  


 


Urine Appearance  CLEAR CLEAR  


 


Urine pH  7.0 4.5-7.5  


 


Urine Specific Gravity  1.022 1.000-1.030  


 


Urine Protein  NEG NEG  


 


Urine Glucose (UA)  3+ NEG  


 


Urine Ketones  NEG NEG  


 


Urine Occult Blood  NEG NEG  


 


Urine Nitrite  NEG NEG  


 


Urine Bilirubin  NEG NEG  


 


Urine Urobilinogen  NEG NEG  


 


Urine Leukocyte Esterase  NEG NEG  








Microbiology Results


8/2/18 Blood Culture, Received


         Pending


8/2/18 Blood Culture, Received


         Pending





Diagnostic Radiology


CT Scan Right Foot:


FINDINGS: The bones are osteopenic. No acute fractures are visualized. There is


no soft tissue gas.





There are vascular calcifications present.





There are no fluid collections to indicate an abscess.





There are no cortical destructive lesions to indicate osteomyelitis.





There is soft tissue edema most pronounced at the level of the first toe.





There are moderate arthritic changes in the subtalar joint.





IMPRESSION:  


1. No CT evidence of acute osteomyelitis.





Impression


Assessment and Plan





(1) Non-healing wound of right heel


Assessment & Plan:  This is a 73-year-old white female with a significant past 

medical history of type 1 insulin-dependent diabetes mellitus, CAD status post 

prior CABG, PAD status post recent revascularization, hypothyroidism, 

hypertension, hyperlipidemia, PAF, diabetic polyneuropathy, GERD, CKD stage III 

who presents to [facility] where she was referred from DEJUAN Stone secondary to MDR wound culture requiring IV antibiotics. In ED initial 

evaluation revealed WBC 7.15, hemoglobin 10.4, hematocrit 31.9, platelets 264, 

sodium 133, potassium 4.8, chloride 96, BUN/creatinine 25, 1.10, glucose 386.  

She was given a dose of IV Vanco, IV gentamicin, 10 units of IV regular insulin 

secondary to hypoglycemia.  ESR, CRP elevated at 79 and 4.8 respectively.  Her 

lactic acid was 1.5.  CT scan right foot revealed soft tissue edema, no 

evidence of osteomyelitis.  She is being admitted for IV antibiotic therapy.





-Admit to med/surg


-consult ID Dr. Guevara for IV antibiotics Vancomycin/Gentamicin per pharmacy


-consult Cardiology Dr. Levy who did most recent angioplasty to RLE on 7/26/18 

s/p R SFA/popliteal angioplasty with drug-eluting balloon


-consult wound care nurse for wound management, also add heel suspension boots 

to prevent further breakdown


-consult pharmacy for glycemic management due to T1DM insulin pump


-repeat CBC, BMP in a.m.


-neuropathy precautions





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


Assessment & Plan:  Plan as above





(3) Severe peripheral arterial disease


Assessment & Plan:  Status post right SFA/popliteal angioplasty with drug-

eluting balloon by Dr. Levy on 7/26/18


-Continue ASA, statin, Pletal therapy





(4) Type 1 diabetes mellitus


Assessment & Plan:  Last A1c on 5/20/18 was 7.9


Patient has insulin pump, uses 0.5 units per basal dosing, daughter initiate 

bolus dosing


Will defer insulin pump management to glycemic pharmacist





Hyperglycemic in ED S/p IV regular insulin. 


Recheck blood glucose was 176





(5) CAD (coronary artery disease)


Assessment & Plan:  With history of CABG


Currently without CP/SOB


Continue risk reduction medical regimen including ASA, statin, beta-blocker.  

She has allergy to Ace/Arbs





(6) Paroxysmal atrial fibrillation


Assessment & Plan:  Continue metoprolol for rate and rhythm control


Continue Eliquis for thrombotic control





(7) Hypertension


Assessment & Plan:  Continue Toprol and low dose amlodipine


Blood pressure has been elevated in ED, most recent 166/59 per Markus at 1919


Will need to monitor this closely.








(8) Hyperlipidemia


Assessment & Plan:  Continue Crestor


Last lipid panel on 5/20/18 revealed total cholesterol 134, LDL 82, HDL 63, 

triglyceride 47





(9) Hypothyroid


Assessment & Plan:  Continue to replete with levothyroxine


Last TSH 1.64 5/20/18





(10) Atherosclerotic dementia


Assessment & Plan:  Continue Namenda, Aricept


She is on Remeron at at bedtime for appetite stimulation





(11) Type 1 DM with CKD stage 3 and hypertension


(12) Diabetic peripheral neuropathy associated with type 1 diabetes mellitus


Assessment & Plan:  neuropathy precautions





(13) GERD (gastroesophageal reflux disease)


Assessment & Plan:  Continue protonix





(14) Hx MRSA infection


Assessment & Plan:  On long term bactrim therapy for suppression


Will hold this while on IV Vanco. 








Advanced Directives


Existing Power of :  Yes (Daughter Rupinder, [phone] would like 

contacted with updates)





Resuscitation Status


DNR





VTE Prophylaxis


Will order VTE Prophylaxis:  Yes (apixaban)





Note





ATTENDING ADDENDUM





Record reviewed.


Patient interviewed and examined.


Care coordinated with Yakelin Magdaleno PA-C.


Please refer to her documentation for patient's history.


Briefly, 74 YO F with CAD, PVD, diabetes admitted with gangrene right great toe 

with associated cellulitis of foot.





EXAM:





General- no distress


Lungs- clear to auscultation; no respiratory distress


Cardiovascular- RRR; III/VI systolic murmur @ base and LSB; no gallop 

appreciated; no JVD; no pretibial edema


Abdomen- + bowel sounds, soft, nontender 


Extremities- no cyanosis; no calf tenderness; s/p amputation left great toe; 

gangrene right great toe; warmth and erythema right foot; right pedal pulses 

not palpable


Neuro- alert, oriented


Skin- warm & dry





DATA:





WBC 7150.


Random glucose 386.


Other lab studies as noted.





ASSESSMENT AND PLAN:





Gangrene right great toe with associated cellulitis of foot.


Diabetic with severe peripheral vascular disease.


Outpatient cultures grew MRSA and Pseudomonas.


IV vancomycin and gent recommended by ID.


Pharmacy consulted for glycemic management of DM on insulin pump.





Please refer to LINDY Magdaleno's documentation for discussion of other issues.





Dakotah Headley MD

## 2018-08-02 NOTE — DIAGNOSTIC IMAGING REPORT
CT RIGHT FOOT NO CONTRAST



CT DOSE: 168.45 mGy.cm



CLINICAL HISTORY: Right foot infection. Possible osteomyelitis.    



TECHNIQUE: Helical images were acquired in the transverse plane. Sagittal and

coronal reformatted images were acquired.  A dose lowering technique was

utilized adhering to the principles of ALARA.





COMPARISON STUDY:  None.



FINDINGS: The bones are osteopenic. No acute fractures are visualized. There is

no soft tissue gas.



There are vascular calcifications present.



There are no fluid collections to indicate an abscess.



There are no cortical destructive lesions to indicate osteomyelitis.



There is soft tissue edema most pronounced at the level of the first toe.



There are moderate arthritic changes in the subtalar joint.



IMPRESSION:  

1. No CT evidence of acute osteomyelitis. 







Electronically signed by:  Samuel Dailey M.D.

8/2/2018 5:02 PM



Dictated Date/Time:  8/2/2018 4:57 PM

## 2018-08-02 NOTE — EMERGENCY ROOM VISIT NOTE
History


Report prepared by Riccardo:  Kevon Rubio


Under the Supervision of:  Dr. Scott Dumont M.D.


First contact with patient:  15:02


Chief Complaint:  WOUND INFECTION


Stated Complaint:  INFECTED FOOT


Nursing Triage Summary:  


triage note:





daughter reports pt has hx of dementia.





daughter reports pt has been at wound center - reports pt right great toe is 


necrotic.





daughter reports other toes on right foot "also have some necrotic areas on 

them 


too."





History of Present Illness


The patient is a 73 year old female who presents to the Emergency Room with 

complaints of a worsening right great toe infection that began in March after 

she "fell" in the hospital and got a blister. Patient was referred to the ER by 

Dr. Stone. Patient denies any pain. Patient is present with her daughter. 

Daughter states the patient is on Bactrim BID. She adds the patient takes 

Eliquis. Patient has a history of similar symptoms on her left great toe--this 

left toe was eventually surgically removed. Daughter adds the patient has been 

more fatigued than usual the past couple of days. Patient states her PCP is Dr. Ruiz [facility] from [place]. Patient denies fevers, chills, cough, 

congestion, and urinary/bowel symptoms.





   Source of History:  patient, family (Daughter)


   Onset:  March


   Position:  toe(s) (Right great toe)


   Timing:  worsening


   Modifying Factors (Relieving):  other (None)


   Associated Symptoms:  No fevers, No chills, No cough, No urinary symptoms


Note:


Negative pain and congestion.





Review of Systems


See HPI for pertinent positives & negatives. A total of 10 systems reviewed and 

were otherwise negative.





Past Medical & Surgical


Medical Problems:


(1) Cellulitis of right foot


(2) Charcot foot due to diabetes mellitus


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


(4) Diabetic peripheral neuropathy associated with type 1 diabetes mellitus


(5) History of diabetic ulcer of foot


(6) Loss of sensation


(7) PAD (peripheral artery disease)


(8) Status post partial amputation of foot








Family History


Omitted secondary to age.





Social History


Smoking Status:  Former Smoker


Drug Use:  none


Marital Status:  single, 


Occupation Status:  retired





Current/Historical Medications


Scheduled


Apixaban (Eliquis), 5 MG PO BID


Aspirin (Aspirin Ec), 81 MG PO DAILY


Calcium Carbonate-Vitamin D (Calcium + D), 1 TAB PO DAILY


Cilostazol (Pletal), 1 TAB PO BID


Citalopram Hydrobromide (Celexa), 20 MG PO DAILY


Docusate Sodium (Colace), 1 CAP PO BID


Donepezil Hydrochloride (Aricept), 10 MG PO DAILY


Furosemide (Lasix), 1 TAB PO DAILY


Insulin Aspart (novoLOG INSULIN PUMP ), 1 EA N/A UD


Levothyroxine Sodium (Synthroid), 88 MCG PO DAILY


Memantine Hcl (Namenda), 10 MG PO BID


Metoprolol Tartrate (Lopressor), 25 MG PO BID


Mirtazapine (Remeron), 0.5 TAB PO HS


Multiple Vitamin (Multivitamin), 1 TAB PO DAILY


Pantoprazole (Protonix), 40 MG PO DAILY


Polyethylene Glycol 3350 (Miralax), 17 GM PO DAILY


Sulfa/Trimethoprim (Bactrim Ds 800MG/160MG), 1 TAB PO BID





Scheduled PRN


Acetaminophen (Tylenol), 1,000 MG PO Q6 PRN for Pain





Miscellaneous Medications


Rosuvastatin Calcium (Crestor), 20





Allergies


Coded Allergies:  


     Penicillins (Unverified  Allergy, Mild, SHORTNESS OF BREATH, 8/2/18)


     Clopidogrel (Unverified  Allergy, Unknown, RASH, 8/2/18)





Physical Exam


Vital Signs











  Date Time  Temp Pulse Resp B/P (MAP) Pulse Ox O2 Delivery O2 Flow Rate FiO2


 


8/2/18 18:20 37.0 61 14 169/66 95 Room Air  


 


8/2/18 15:53 36.8 64  208/62 96 Room Air  





    174/117    


 


8/2/18 14:05 36.8 69 20 146/84 92 Room Air  











Physical Exam


GENERAL: Patient is in no acute distress.


HEENT: No acute trauma, normocephalic atraumatic, mucous membranes moist, no 

nasal congestion, no scleral icterus.


NECK: No stridor, no adenopathy, no meningismus, trachea is midline.


LUNGS: Clear to auscultation bilaterally, no wheeze, no rhonchi, breath sounds 

equal.


HEART: 2/6 systolic murmur, regular rate and rhythm.


ABDOMEN: Soft, nontender, bowel sounds positive, no hernias, no peritonitis.


EXTREMITIES: Right first toe is necrotic and black, there is erythema from this 

toe base and then involving the entire right foot, warmth to touch, no drainage.


NEUROLOGIC: Oriented x 3, no acute motor or sensory deficits, no focal weakness.


SKIN: No rash, no jaundice, no diaphoresis.





Medical Decision & Procedures


ER Provider


Diagnostic Interpretation:


Radiology results as stated below per my review and radiologist interpretation:





CT RIGHT FOOT NO CONTRAST





CT DOSE: 168.45 mGy.cm





CLINICAL HISTORY: Right foot infection. Possible osteomyelitis.    





TECHNIQUE: Helical images were acquired in the transverse plane. Sagittal and


coronal reformatted images were acquired.  A dose lowering technique was


utilized adhering to the principles of ALARA.








COMPARISON STUDY:  None.





FINDINGS: The bones are osteopenic. No acute fractures are visualized. There is


no soft tissue gas.





There are vascular calcifications present.





There are no fluid collections to indicate an abscess.





There are no cortical destructive lesions to indicate osteomyelitis.





There is soft tissue edema most pronounced at the level of the first toe.





There are moderate arthritic changes in the subtalar joint.





IMPRESSION:  


1. No CT evidence of acute osteomyelitis. 





Electronically signed by:  Samuel Dailey M.D.


8/2/2018 5:02 PM





Laboratory Results


8/2/18 15:45








Red Blood Count 3.48, Mean Corpuscular Volume 91.7, Mean Corpuscular Hemoglobin 

29.9, Mean Corpuscular Hemoglobin Concent 32.6, Mean Platelet Volume 10.1, 

Neutrophils (%) (Auto) 67.8, Lymphocytes (%) (Auto) 18.7, Monocytes (%) (Auto) 

10.1, Eosinophils (%) (Auto) 2.2, Basophils (%) (Auto) 0.6, Neutrophils # (Auto

) 4.85, Lymphocytes # (Auto) 1.34, Monocytes # (Auto) 0.72, Eosinophils # (Auto

) 0.16, Basophils # (Auto) 0.04





8/2/18 15:45

















Test


  8/2/18


15:45 8/2/18


16:35


 


White Blood Count


  7.15 K/uL


(4.8-10.8) 


 


 


Red Blood Count


  3.48 M/uL


(4.2-5.4) 


 


 


Hemoglobin


  10.4 g/dL


(12.0-16.0) 


 


 


Hematocrit 31.9 % (37-47)  


 


Mean Corpuscular Volume


  91.7 fL


() 


 


 


Mean Corpuscular Hemoglobin


  29.9 pg


(25-34) 


 


 


Mean Corpuscular Hemoglobin


Concent 32.6 g/dl


(32-36) 


 


 


Platelet Count


  364 K/uL


(130-400) 


 


 


Mean Platelet Volume


  10.1 fL


(7.4-10.4) 


 


 


Neutrophils (%) (Auto) 67.8 %  


 


Lymphocytes (%) (Auto) 18.7 %  


 


Monocytes (%) (Auto) 10.1 %  


 


Eosinophils (%) (Auto) 2.2 %  


 


Basophils (%) (Auto) 0.6 %  


 


Neutrophils # (Auto)


  4.85 K/uL


(1.4-6.5) 


 


 


Lymphocytes # (Auto)


  1.34 K/uL


(1.2-3.4) 


 


 


Monocytes # (Auto)


  0.72 K/uL


(0.11-0.59) 


 


 


Eosinophils # (Auto)


  0.16 K/uL


(0-0.5) 


 


 


Basophils # (Auto)


  0.04 K/uL


(0-0.2) 


 


 


RDW Standard Deviation


  59.8 fL


(36.4-46.3) 


 


 


RDW Coefficient of Variation


  17.9 %


(11.5-14.5) 


 


 


Immature Granulocyte % (Auto) 0.6 %  


 


Immature Granulocyte # (Auto)


  0.04 K/uL


(0.00-0.02) 


 


 


Erythrocyte Sedimentation Rate


  79 mm/hr


(0-21) 


 


 


Prothrombin Time


  11.2 SECONDS


(9.0-12.0) 


 


 


Prothromb Time International


Ratio 1.1 (0.9-1.1) 


  


 


 


Activated Partial


Thromboplast Time 30.3 SECONDS


(21.0-31.0) 


 


 


Partial Thromboplastin Ratio 1.2  


 


Anion Gap


  5.0 mmol/L


(3-11) 


 


 


Est Creatinine Clear Calc


Drug Dose 41.9 ml/min 


  


 


 


Estimated GFR (


American) 56.9 


  


 


 


Estimated GFR (Non-


American 49.1 


  


 


 


BUN/Creatinine Ratio 22.8 (10-20)  


 


Lactic Acid Level


  1.5 mmol/L


(0.4-2.0) 


 


 


Calcium Level


  9.0 mg/dl


(8.5-10.1) 


 


 


Total Bilirubin


  0.2 mg/dl


(0.2-1) 


 


 


Aspartate Amino Transf


(AST/SGOT) 24 U/L (15-37) 


  


 


 


Alanine Aminotransferase


(ALT/SGPT) 25 U/L (12-78) 


  


 


 


Alkaline Phosphatase


  154 U/L


() 


 


 


C-Reactive Protein


  4.22 mg/dl


(0-0.29) 


 


 


Total Protein


  7.0 gm/dl


(6.4-8.2) 


 


 


Albumin


  2.5 gm/dl


(3.4-5.0) 


 


 


Globulin


  4.5 gm/dl


(2.5-4.0) 


 


 


Albumin/Globulin Ratio 0.6 (0.9-2)  


 


Beta-Hydroxybutyric Acid


  1.21 mg/dL


(0.2-2.81) 


 


 


Urine Color  YELLOW 


 


Urine Appearance  CLEAR (CLEAR) 


 


Urine pH  7.0 (4.5-7.5) 


 


Urine Specific Gravity


  


  1.022


(1.000-1.030)


 


Urine Protein  NEG (NEG) 


 


Urine Glucose (UA)  3+ (NEG) 


 


Urine Ketones  NEG (NEG) 


 


Urine Occult Blood  NEG (NEG) 


 


Urine Nitrite  NEG (NEG) 


 


Urine Bilirubin  NEG (NEG) 


 


Urine Urobilinogen  NEG (NEG) 


 


Urine Leukocyte Esterase  NEG (NEG) 





Laboratory results reviewed by me.





Medications Administered











 Medications


  (Trade)  Dose


 Ordered  Sig/Amparo


 Route  Start Time


 Stop Time Status Last Admin


Dose Admin


 


 Vancomycin HCl


  (Vancomycin 1gm


 Ed/Asu Omnicell)  1 gm  NOW  STAT


 IV  8/2/18 15:11


 8/2/18 15:14 DC 8/2/18 16:31


1 GM


 


 Insulin Human


 Regular


  (novoLIN-R U-100


 PER UNIT)  10 units  NOW  STAT


 IV  8/2/18 16:38


 8/2/18 16:39 DC 8/2/18 17:27


10 UNITS


 


 Sodium Chloride  1,000 ml @ 


 999 mls/hr  Q1H1M STAT


 IV  8/2/18 16:39


 8/2/18 17:39 DC 8/2/18 16:39


999 MLS/HR











ED Course


1500: The patient was evaluated in room B2. A complete history and physical 

exam was performed.





1511: Vancomycin HCl 1gm IV





1638: Insulin Human Regular 10 units IV





1639: Sodium Chloride 1000 ml @ 999 mls/hr IV





1645: I reevaluated the patient and updated her on her findings.





1655: Gentamicin 110.5ml @ 100 mls/hr Protocol IV





1703: Upon reexamination the patient will be further evaluated. I discussed 

results and treatment plan with the patient. She verbalizes agreement and 

understanding. I spoke with Yakelin Magdaleno PA-C of the [facility] 

Service. We discussed the patient's results and findings. The patient will be 

evaluated by Yakelin Magdaleno PA-C for further management.





Medical Decision


Differential Diagnosis:


Osteomyelitis, cellulitis, sepsis, dehydration, electrolyte imbalance, anemia, 

and failed outpatient therapy.





There is no leukocytosis.  A mild anemia is present.  Sugar is elevated at over 

300, no kidney failure.  No hepatitis.  Lactic acid is not elevated making 

sepsis less likely.  Blood cultures are pending.  CRP and sed rate were 

elevated consistent with inflammation/infection.  Urinalysis does not show 

infection.  CT of the patient's right foot does not show evidence for 

osteomyelitis.





The patient presents with failed outpatient treatment for her right foot 

cellulitis.  She did receive IV vancomycin, she was ordered for IV gentamicin.  

She received IV saline and IV insulin.





Patient requires a hospital stay, I suspect she will require surgical removal 

of her right toe.  I spoke to the patient and her family, case management has 

been involved.  I did consult the on-call hospitalist.





Medication Reconcilliation


Current Medication List:  was personally reviewed by me





Blood Pressure Screening


Patient's blood pressure:  Elevated blood pressure


Referred to hospitalist.





Consults


Time Called:  1642


Consulting Physician:  Yakelin Magdaleno PA-C - [facility] Hospitalist


Returned Call:  1645


Discussed the patient's case. The patient will be evaluated for further 

management.





Impression





 Primary Impression:  


 Cellulitis of right foot


 Additional Impressions:  


 Hyperglycemia


 Failure of outpatient treatment


 Necrosis of toe





Scribe Attestation


The scribe's documentation has been prepared under my direction and personally 

reviewed by me in its entirety. I confirm that the note above accurately 

reflects all work, treatment, procedures, and medical decision making performed 

by me.





Departure Information


Dispostion


Being Evaluated By Hospitalist





Referrals


Jhonathan Mallory M.D. (PCP)





Forms


HOME CARE DOCUMENTATION FORM,                                                 

               IMPORTANT VISIT INFORMATION, WORK / SCHOOL INSTRUCTIONS





Patient Instructions


My [facility]





Problem Qualifiers

## 2018-08-02 NOTE — PHARMACY PROGRESS NOTE
Pharmacy Abx Dose Short Note


Date of Service


Aug 2, 2018.





Assessment & Plan


Assessment








73 year old female with a PMH of T1DM managed on Novolog pump, CAD s/p CABG, 

and PAD who presents with worsening toe infection. Patient's cultures contain 

MDR Pseudomonas, MRSA, and ESBL E coli. Per patient's daughter, patient well 

controlled on insulin pump at home with bolus doses managed by daughter. Pump 

provides basal rate only. 





For antibiotics, patient initially started on vancomycin and gentamicin but 

pharmacy reached out to physician to have vancomycin changed to daptomycin for 

concern regarding nephrotoxicity in elderly woman. Gentamicin given in ED and 

will obtain random via [facility]/





For glycemic, after nurse spoke with daughter who emphasized that Novolog pump 

provides excellent basal coverage, will continue Novolog pump for basal 

coverage ONLY. Nursing to give coverage ACHS via Novolog injections.














Plan








Gentamicin 


* gentamicin 420 mg IV x 1 (7 mg/kg) and obtain random at 0500 





Pharmacy Glycemic Consult


* basal = Novolog pump (0.525 units/hr) continued 


* bolus ACHS (if NPO status then change to q4 checks)


 * goal range = 120-160 


 * correction factor = 40 mg/dL/unit


 * carbohydrate ratio = 1 unit per 15 grams of carbohydrates consumed





Pharmacy will continue to follow and will adjust dose/frequency as necessary.  

Thank you.

## 2018-08-03 VITALS — SYSTOLIC BLOOD PRESSURE: 137 MMHG | HEART RATE: 69 BPM | DIASTOLIC BLOOD PRESSURE: 53 MMHG

## 2018-08-03 VITALS
OXYGEN SATURATION: 95 % | DIASTOLIC BLOOD PRESSURE: 66 MMHG | SYSTOLIC BLOOD PRESSURE: 119 MMHG | TEMPERATURE: 98.42 F | HEART RATE: 69 BPM

## 2018-08-03 VITALS
OXYGEN SATURATION: 91 % | HEART RATE: 74 BPM | TEMPERATURE: 99.14 F | DIASTOLIC BLOOD PRESSURE: 62 MMHG | SYSTOLIC BLOOD PRESSURE: 133 MMHG

## 2018-08-03 VITALS
HEART RATE: 63 BPM | DIASTOLIC BLOOD PRESSURE: 57 MMHG | SYSTOLIC BLOOD PRESSURE: 148 MMHG | TEMPERATURE: 98.6 F | OXYGEN SATURATION: 92 %

## 2018-08-03 LAB
ALBUMIN SERPL-MCNC: 2.2 GM/DL (ref 3.4–5)
ALP SERPL-CCNC: 135 U/L (ref 45–117)
ALT SERPL-CCNC: 21 U/L (ref 12–78)
AST SERPL-CCNC: 21 U/L (ref 15–37)
BASOPHILS # BLD: 0.03 K/UL (ref 0–0.2)
BASOPHILS NFR BLD: 0.3 %
BUN SERPL-MCNC: 19 MG/DL (ref 7–18)
CALCIUM SERPL-MCNC: 8.3 MG/DL (ref 8.5–10.1)
CO2 SERPL-SCNC: 28 MMOL/L (ref 21–32)
CREAT SERPL-MCNC: 0.93 MG/DL (ref 0.6–1.2)
EOS ABS #: 0.11 K/UL (ref 0–0.5)
EOSINOPHIL NFR BLD AUTO: 336 K/UL (ref 130–400)
GLUCOSE SERPL-MCNC: 123 MG/DL (ref 70–99)
HBA1C MFR BLD: 9.2 % (ref 4.5–5.6)
HCT VFR BLD CALC: 31.2 % (ref 37–47)
HGB BLD-MCNC: 9.9 G/DL (ref 12–16)
IG#: 0.03 K/UL (ref 0–0.02)
IMM GRANULOCYTES NFR BLD AUTO: 5.4 %
LYMPHOCYTES # BLD: 0.49 K/UL (ref 1.2–3.4)
MCH RBC QN AUTO: 28.8 PG (ref 25–34)
MCHC RBC AUTO-ENTMCNC: 31.7 G/DL (ref 32–36)
MCV RBC AUTO: 90.7 FL (ref 80–100)
MONO ABS #: 0.46 K/UL (ref 0.11–0.59)
MONOCYTES NFR BLD: 5.1 %
NEUT ABS #: 7.94 K/UL (ref 1.4–6.5)
NEUTROPHILS # BLD AUTO: 1.2 %
NEUTROPHILS NFR BLD AUTO: 87.7 %
PMV BLD AUTO: 9.4 FL (ref 7.4–10.4)
POTASSIUM SERPL-SCNC: 4.3 MMOL/L (ref 3.5–5.1)
PROT SERPL-MCNC: 6.2 GM/DL (ref 6.4–8.2)
RED CELL DISTRIBUTION WIDTH CV: 17.7 % (ref 11.5–14.5)
RED CELL DISTRIBUTION WIDTH SD: 59.1 FL (ref 36.4–46.3)
SODIUM SERPL-SCNC: 137 MMOL/L (ref 136–145)
WBC # BLD AUTO: 9.06 K/UL (ref 4.8–10.8)

## 2018-08-03 RX ADMIN — Medication SCH MG: at 08:35

## 2018-08-03 RX ADMIN — AMLODIPINE BESYLATE SCH MG: 5 TABLET ORAL at 08:35

## 2018-08-03 RX ADMIN — Medication SCH CAN: at 17:45

## 2018-08-03 RX ADMIN — DONEPEZIL HYDROCHLORIDE SCH MG: 10 TABLET, FILM COATED ORAL at 08:34

## 2018-08-03 RX ADMIN — CITALOPRAM HYDROBROMIDE SCH MG: 20 TABLET ORAL at 08:35

## 2018-08-03 RX ADMIN — DOCUSATE SODIUM SCH MG: 100 CAPSULE, LIQUID FILLED ORAL at 08:33

## 2018-08-03 RX ADMIN — Medication SCH TAB: at 08:34

## 2018-08-03 RX ADMIN — INSULIN ASPART SCH UNITS: 100 INJECTION, SOLUTION INTRAVENOUS; SUBCUTANEOUS at 08:45

## 2018-08-03 RX ADMIN — INSULIN ASPART SCH EA: 100 INJECTION, SOLUTION INTRAVENOUS; SUBCUTANEOUS at 20:46

## 2018-08-03 RX ADMIN — DAPTOMYCIN SCH MLS/MIN: 50 INJECTION, POWDER, LYOPHILIZED, FOR SOLUTION INTRAVENOUS at 21:45

## 2018-08-03 RX ADMIN — INSULIN ASPART SCH EA: 100 INJECTION, SOLUTION INTRAVENOUS; SUBCUTANEOUS at 08:46

## 2018-08-03 RX ADMIN — INSULIN ASPART SCH EA: 100 INJECTION, SOLUTION INTRAVENOUS; SUBCUTANEOUS at 12:03

## 2018-08-03 RX ADMIN — INSULIN ASPART SCH UNITS: 100 INJECTION, SOLUTION INTRAVENOUS; SUBCUTANEOUS at 12:43

## 2018-08-03 RX ADMIN — METOPROLOL TARTRATE SCH MG: 25 TABLET, FILM COATED ORAL at 20:35

## 2018-08-03 RX ADMIN — INSULIN ASPART SCH EA: 100 INJECTION, SOLUTION INTRAVENOUS; SUBCUTANEOUS at 17:15

## 2018-08-03 RX ADMIN — Medication SCH TAB: at 08:35

## 2018-08-03 RX ADMIN — MEMANTINE HYDROCHLORIDE SCH MG: 10 TABLET ORAL at 20:36

## 2018-08-03 RX ADMIN — METOPROLOL TARTRATE SCH MG: 25 TABLET, FILM COATED ORAL at 08:36

## 2018-08-03 RX ADMIN — INSULIN ASPART SCH UNITS: 100 INJECTION, SOLUTION INTRAVENOUS; SUBCUTANEOUS at 20:46

## 2018-08-03 RX ADMIN — ROSUVASTATIN CALCIUM SCH MG: 20 TABLET, FILM COATED ORAL at 20:34

## 2018-08-03 RX ADMIN — Medication SCH GM: at 08:46

## 2018-08-03 RX ADMIN — DOCUSATE SODIUM SCH MG: 100 CAPSULE, LIQUID FILLED ORAL at 20:34

## 2018-08-03 RX ADMIN — CILOSTAZOL SCH MG: 100 TABLET ORAL at 08:35

## 2018-08-03 RX ADMIN — MEMANTINE HYDROCHLORIDE SCH MG: 10 TABLET ORAL at 08:35

## 2018-08-03 RX ADMIN — APIXABAN SCH MG: 5 TABLET, FILM COATED ORAL at 08:36

## 2018-08-03 RX ADMIN — INSULIN ASPART SCH UNITS: 100 INJECTION, SOLUTION INTRAVENOUS; SUBCUTANEOUS at 17:44

## 2018-08-03 RX ADMIN — CILOSTAZOL SCH MG: 100 TABLET ORAL at 20:34

## 2018-08-03 RX ADMIN — MIRTAZAPINE SCH MG: 15 TABLET, FILM COATED ORAL at 20:35

## 2018-08-03 RX ADMIN — APIXABAN SCH MG: 5 TABLET, FILM COATED ORAL at 20:34

## 2018-08-03 RX ADMIN — CEFTOLOZANE AND TAZOBACTAM SCH MLS/HR: 1; .5 INJECTION, POWDER, LYOPHILIZED, FOR SOLUTION INTRAVENOUS at 16:37

## 2018-08-03 RX ADMIN — FUROSEMIDE SCH MG: 40 TABLET ORAL at 08:34

## 2018-08-03 RX ADMIN — LEVOTHYROXINE SODIUM SCH MCG: 88 TABLET ORAL at 05:31

## 2018-08-03 RX ADMIN — PANTOPRAZOLE SCH MG: 40 TABLET, DELAYED RELEASE ORAL at 08:33

## 2018-08-03 NOTE — HOSPITALIST PROGRESS NOTE
Hospitalist Progress Note


Date of Service


Aug 3, 2018.


 (Yakelin Magdaleno PA-C)


8/3/18


.


 (Dakotah Headley M.D.)


Subjective


Pt evaluation today including:  conversation w/ patient (and RN)


Patient was seen and evaluated in bedside chair in room 360





"I feel great."  Overall denies pain, right lower extremity pain, fever, chills

, sweats, chest pain, shortness of breath, nausea, vomiting.  She had a normal 

bowel movement this morning and urinating adequately per nurse.  States she did 

not sleep well last night.  She had a good breakfast.  Per nursing, her insulin 

pump is in place delivering a basal rate of 0.525units/hr and they are covering 

her with NovoLog before meals.  She can ambulate with walker into bathroom with 

assistance. Her pedal pulse in RLE was detected via dopplar by RN while I was 

in room with patient. Nursing offers no concerns.


 (Yakelin Magdaleno PA-C)





Pleasantly confused.


Afebrile.


Denies foot pain.


Daughter visiting.


 (Dakotah Headley M.D.)


Medications











 Medications


  (Trade)  Dose


 Ordered  Sig/Amparo


 Route  Start Time


 Stop Time Status Last Admin


Dose Admin


 


 Vancomycin HCl


  (Vancomycin 1gm


 Ed/Asu Omnicell)  1 gm  NOW  STAT


 IV  8/2/18 15:11


 8/2/18 15:14 DC 8/2/18 16:31


1 GM


 


 Insulin Human


 Regular


  (novoLIN-R U-100


 PER UNIT)  10 units  NOW  STAT


 IV  8/2/18 16:38


 8/2/18 16:39 DC 8/2/18 17:27


10 UNITS


 


 Sodium Chloride  1,000 ml @ 


 999 mls/hr  Q1H1M STAT


 IV  8/2/18 16:39


 8/2/18 17:39 DC 8/2/18 16:39


999 MLS/HR


 


 Gentamicin


 Sulfate 420 mg/


 Dextrose  110.5 ml @ 


 100 mls/hr  NOW  STAT


 IV  8/2/18 16:55


 8/2/18 18:01 DC 8/2/18 19:03


100 MLS/HR


 


 Apixaban


  (Eliquis)  5 mg  BID


 PO  8/2/18 21:00


 9/1/18 20:59  8/3/18 08:36


5 MG


 


 Aspirin


  (Ecotrin Tab)  81 mg  DAILY


 PO  8/3/18 09:00


 9/2/18 08:59  8/3/18 08:35


81 MG


 


 Cilostazol


  (Pletal Tab)  100 mg  BID


 PO  8/2/18 21:00


 9/1/18 20:59  8/3/18 08:35


100 MG


 


 Citalopram


 Hydrobromide


  (celeXA TAB)  20 mg  DAILY


 PO  8/3/18 09:00


 9/2/18 08:59  8/3/18 08:35


20 MG


 


 Docusate Sodium


  (coLACE CAP)  100 mg  BID


 PO  8/2/18 21:00


 9/1/18 20:59  8/3/18 08:33


100 MG


 


 Donepezil HCl


  (Aricept Tab)  10 mg  DAILY


 PO  8/3/18 09:00


 9/2/18 08:59  8/3/18 08:34


10 MG


 


 Levothyroxine


 Sodium


  (Synthroid Tab)  88 mcg  DAILYBB


 PO  8/3/18 06:00


 9/2/18 06:59  8/3/18 05:31


88 MCG


 


 Memantine


  (Namenda Tab)  10 mg  BID


 PO  8/2/18 21:00


 9/1/18 20:59  8/3/18 08:35


10 MG


 


 Multivitamins


  (Multivitamin


 Tab)  1 tab  DAILY


 PO  8/3/18 09:00


 9/2/18 08:59  8/3/18 08:35


1 TAB


 


 Pantoprazole


 Sodium


  (Protonix Tab)  40 mg  DAILY


 PO  8/3/18 09:00


 9/2/18 08:59  8/3/18 08:33


40 MG


 


 Furosemide


  (Lasix Tab)  40 mg  DAILY


 PO  8/3/18 09:00


 9/2/18 08:59  8/3/18 08:34


40 MG


 


 Metoprolol


 Tartrate


  (Lopressor Tab)  25 mg  BID


 PO  8/2/18 21:00


 9/1/18 20:59  8/3/18 08:36


25 MG


 


 Mirtazapine


  (Remeron Tab)  7.5 mg  HS


 PO  8/2/18 21:00


 9/1/18 20:59  8/2/18 21:55


7.5 MG


 


 Rosuvastatin


 Calcium


  (Crestor Tab)  20 mg  HS


 PO  8/2/18 21:00


 9/1/18 20:59  8/2/18 21:53


20 MG


 


 Calcium/Vitamin D


  (Caltrate Plus


 Tab)  1 tab  DAILY


 PO  8/3/18 09:00


 9/2/18 08:59  8/3/18 08:34


1 TAB


 


 Polyethylene


  (Miralax Powder


 Packet)  17 gm  DAILY


 PO  8/3/18 09:00


 9/2/18 08:59  8/3/18 08:46


17 GM


 


 Amlodipine


 Besylate


  (Norvasc Tab)  2.5 mg  DAILY


 PO  8/3/18 09:00


 9/2/18 08:59  8/3/18 08:35


2.5 MG


 


 Insulin Aspart


  (novoLOG ASPART)  **SLIDING


 SCALE**


 **G...  ACHS


 SC  8/2/18 21:00


 9/1/18 20:59  8/3/18 08:45


4 UNITS


 


 Daptomycin 400 mg/


 Syringe  8 ml @ 4


 mls/min  Q24H


 IV  8/2/18 22:00


 9/13/18 21:59  8/2/18 23:11


4 MLS/MIN


 


 Insulin Aspart


  (novoLOG INSULIN


 PUMP)  1 ea  ACHS


 N/A  8/2/18 21:00


 9/1/18 20:59  8/2/18 21:00


1 EA








 (Yakelin Magdaleno PA-C)





Objective


Vital Signs











  Date Time  Temp Pulse Resp B/P (MAP) Pulse Ox O2 Delivery O2 Flow Rate FiO2


 


8/3/18 07:50      Room Air  


 


8/3/18 07:46 37.3 74 18 133/62 (85) 91 Room Air  


 


8/3/18 00:10      Room Air  


 


8/2/18 22:55 36.7 63 15 149/54 (85) 93 Room Air  


 


8/2/18 19:50 36.7 64 18 183/65  Room Air  


 


8/2/18 19:07  64  166/59 94 Room Air  


 


8/2/18 18:20 37.0 61 14 169/66 95 Room Air  


 


8/2/18 15:53 36.8 64  208/62 96 Room Air  





    174/117    


 


8/2/18 14:05 36.8 69 20 146/84 92 Room Air  








 (Yakelin Magdaleno PA-C)





Physical Exam


Notes:


Gen: Tall, thin, female, sitting up at bedside in chair, NAD, A&O x3, very 

pleasant


HEENT: Normocephalic, atraumatic, conjunctivae moist, sclerae anicteric, mucous 

membranes moist.


Lung: Clear to Auscultation bilaterally, no wheezes/rales/rhonchi 


Heart: Regular rate, regular rhythm, 2/6 JOANNA noted LSB radiation to carotid, no 

rubs, or gallops appreciated


Abdomen: Soft, NT, ND +BS x 4


Extremities: No edema in lower extremities bilaterally, mild erythema to the 

dorsal aspect of right foot, necrotic great toe extending to base of toe, 

nonpalpable right pedal pulse however obtained Via Doppler with nurse, left 

great toe surgically absent


Skin: Warm, no rash, negative turgor.


 (Yakelin Magdaleno PA-C)


Notes:





General- no distress


Lungs- clear to auscultation; no respiratory distress


Cardiovascular- RRR; III/VI systolic murmur LSB; no gallop; no JVD; no 

pretibial edema


Abdomen- + bowel sounds, soft, nontender 


Extremities- no cyanosis; no calf tenderness; s/p amputation left great toe; 

gangrene right great toe; less warmth and erythema right foot


Neuro- alert, mild confusion


Skin- warm & dry


 (Dkaotah Headley M.D.)


Laboratory Results











Item Value  Date Time


 


White Blood Count 9.06 K/uL 8/3/18 0452


 


Hemoglobin 9.9 g/dL L 8/3/18 0452


 


Hematocrit 31.2 % L 8/3/18 0452


 


Platelet Count 336 K/uL 8/3/18 0452


 


Sodium Level 137 mmol/L 8/3/18 0452


 


Potassium Level 4.3 mmol/L 8/3/18 0452


 


Chloride Level 104 mmol/L 8/3/18 0452


 


Carbon Dioxide Level 28 mmol/L 8/3/18 0452


 


Blood Urea Nitrogen 19 mg/dl H 8/3/18 0452


 


Creatinine 0.93 mg/dl 8/3/18 0452


 


Random Glucose 123 mg/dl H 8/3/18 0452








 (Yakelin Magdaleno PA-C)





Assessment and Plan





(1) Cellulitis of right foot


Assessment & Plan:  This is a 73-year-old white female with a significant past 

medical history of type 1 insulin-dependent diabetes mellitus, CAD status post 

prior CABG, PAD status post recent revascularization, hypothyroidism, 

hypertension, hyperlipidemia, PAF, diabetic polyneuropathy, GERD, CKD stage III 

who presents to [facility] where she was referred from ID Dr. Stone secondary to MDR wound culture requiring IV antibiotics. In ED initial 

evaluation revealed WBC 7.15, hemoglobin 10.4, hematocrit 31.9, platelets 264, 

sodium 133, potassium 4.8, chloride 96, BUN/creatinine 25, 1.10, glucose 386.  

She was given a dose of IV Vanco, IV gentamicin, 10 units of IV regular insulin 

secondary to hypoglycemia.  ESR, CRP elevated at 79 and 4.8 respectively.  Her 

lactic acid was 1.5.  CT scan right foot revealed soft tissue edema, no 

evidence of osteomyelitis.  She has been admitted for IV antibiotic therapy.





-IV vancomycin switched to daptomycin due to less nephrotoxicity


-Continue IV gentamicin


-Await infectious disease and cardiology Dr. Levy consult


-Continue wound care and wound care prevention


-Repeat CBC, creatinine in a.m.


-Follow ESR/CRP periodically


-neuropathy precautions





(2) Non-healing wound of right heel


Assessment & Plan:  Continue local wound care


Await consult from Dr. Levy





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


Assessment & Plan:  Plan as above





(4) Severe peripheral arterial disease


Assessment & Plan:  Status post right SFA/popliteal angioplasty with drug-

eluting balloon by Dr. Levy on 7/26/18


-Continue ASA, statin, Pletal therapy


-Routine pulse checks Via Doppler per nursing





(5) Type 1 diabetes mellitus


Assessment & Plan:  Last A1c on 5/20/18 was 7.9


-Continue insulin pump for basal dosing 0.525units/hr


-NovoLog sliding scale per nursing protocol


-Glycemic pharmacist on board


-Blood sugars improved this morning than on admission





(6) Hypoalbuminemia


Assessment & Plan:  Albumin 2.2 and weight 60 kg


- consult dietitian for appropriate wound healing supplementation and PCM 

management





(7) CAD (coronary artery disease)


Assessment & Plan:  With history of CABG


Currently without CP/SOB


Continue risk reduction medical regimen including ASA, statin, beta-blocker.  

She has allergy to Ace/Arbs





(8) Paroxysmal atrial fibrillation


Assessment & Plan:  Continue metoprolol for rate and rhythm control


Continue Eliquis for thrombotic control





(9) Hypertension


Assessment & Plan:  -Blood pressure 133/62, improved from admission 


-continue Toprol and low dose amlodipine


-Continue to monitor





(10) Hyperlipidemia


Assessment & Plan:  Continue Crestor


Last lipid panel on 5/20/18 revealed total cholesterol 134, LDL 82, HDL 63, 

triglyceride 47





(11) Hypothyroid


Assessment & Plan:  Continue to replete with levothyroxine


Last TSH 1.64 5/20/18





(12) Atherosclerotic dementia


Assessment & Plan:  Continue Namenda, Aricept


She is on Remeron at at bedtime for appetite stimulation





(13) Type 1 DM with CKD stage 3 and hypertension


Assessment & Plan:  BUN/Crea 19/0.93


Monitor closely while on IV Dapto/Gent





(14) Diabetic peripheral neuropathy associated with type 1 diabetes mellitus


Assessment & Plan:  neuropathy precautions





(15) GERD (gastroesophageal reflux disease)


Assessment & Plan:  Continue protonix





(16) Hx MRSA infection


Assessment & Plan:  On long term bactrim therapy for suppression


On hold while on IV daptomycin. 


 (Yaklein Magdaleno, PAOndinaC)





Gangrene right great toe with cellulitis of foot.


Outpatient cultures have grown MRSA and Pseudomonas.


ID and Interventional Cardiology consulted.


Receiving IV daptomycin + ceftolozane/tazobactam.


May need surgical management of right great toe gangrene as well.





Other problems as outlined in LINDY Magdaleno's documentation.


 (Dakotah Headley M.D.)

## 2018-08-03 NOTE — PHARMACY PROGRESS NOTE
Pharmacy Glycemic Short Note 2


Date of Service


Aug 3, 2018.





OUTPATIENT ANTIDIABETIC REGIMEN: 


* NovoLog SQ insulin pump


 * Basal Rate = 0.525 units/hr


 * Bolus per parameters:


 * Goal range = 110-150 mg/dl


 * CF = 60 mg/dl/unit


 * CR = 1 unit for every 18g CHO consumed











Item Value  Date Time


 


Bedside Glucose 176 mg/dl H 8/2/18 1906


 


Bedside Glucose 131 mg/dl H 8/2/18 2035


 


Bedside Glucose 113 mg/dl H 8/3/18 0811


 


Bedside Glucose 162 mg/dl H 8/3/18 1226














ASSESSMENT:


* 74yo T1DM female maintained on SQ insulin pump with inadequate control. Goal 

A1c closer to 8% based on age/co-morbidities. A1c is 9.4% on 8/2/18


* [facility] spoke with patient's daughter. Daughter manages insulin boluses per pump 

as an outpatient. Pt is not able to bolus herself for hyperglycemia or CHO 

consumed. Daughter requested that we continue the basal rate on the pump and 

bolus with NovoLog SQ


* Typically we do not like this "hybrid" plan as it runs the risk of double/

over bolusing. However, plan discussed in detail with family and nursing and it 

has been working well for the past 24hrs. 


* BSGs ranging 113-176 mg/dl. 


* Patient receiving 3-4 units of insulin with meals in addition to her insulin 

pump which is providing basal insulin only. 











PLAN FOR INPATIENT GLYCEMIC CONTROL: no changes needed at this time 


* Basal insulin: no change 


 * Per SQ insulin pump outpatient settings





* Bolus insulin: nursing to administer SQ NovoLog for meal time coverage since 

patient cannot bolus herself from pump


 * NovoLog per scale ACHS or Q6hrs while NPO


 * Goal Range:  Low 120 mg/dL - High 160 mg/dL


 * Correction Factor:  45 mg/dL/unit


 * Nutritional / Prandial insulin per carb ratio of  1 unit per 15 grams CHO 

consumed

## 2018-08-03 NOTE — MEDICAL CONSULT
Consultation


Date of Consultation:


Aug 3, 2018.


Attending Physician:


Dakotah Headley M.D.


Reason for Consultation:


IV antibiotics


History of Present Illness


73-year-old female well known to the Infectious Disease service, with 

longstanding diabetes mellitus, coronary artery disease, severe peripheral 

arterial disease status post revascularization procedure in July, who has been 

[facility]ed [facility] for chronic right heel ulceration as well as 

gangrenous right great toe.  Previous cultures from radial wound have grown 

MRSA and a highly resistant Pseudomonas aeruginosa, resistant to quinolone 

antibiotics.  Patient was seen yesterday at the wound Care [facility] and found to 

have evidence of worsening right foot infection with erythema and swelling of 

the right foot and persistent gangrenous changes of the right great toe.  She 

was referred for IV antibiotics.  She has been started on daptomycin.  She 

denies any significant associated systemic complaints, specifically has not had 

fever or chills, severely uncontrolled blood sugar, or other new complaints.





Past Medical/Surgical History


Medical Problems:


(1) Failure of outpatient treatment


Status: Acute  





(2) Hyperglycemia


Status: Acute  





(3) Necrosis of toe


Status: Acute  





Medical Problems:


(1) Atherosclerotic dementia


(2) CAD (coronary artery disease)


(3) Charcot foot due to diabetes mellitus


(4) Diabetic peripheral neuropathy associated with type 1 diabetes mellitus


(5) Diabetic polyneuropathy


(6) GERD (gastroesophageal reflux disease)


(7) History of diabetic ulcer of foot


(8) Hx MRSA infection


(9) Hyperlipidemia


(10) Hypertension


(11) Hypoalbuminemia


(12) Hypothyroid


(13) Non-healing wound of right heel


(14) Paroxysmal atrial fibrillation


(15) Peripheral vascular angioplasty status


(16) Severe peripheral arterial disease


(17) Status post partial amputation of foot


(18) Type 1 diabetes mellitus


(19) Type 1 DM with CKD stage 3 and hypertension


Surgical Problems:


(1) Amputated great toe of left foot


(2) Hx of appendectomy


(3) Hx of CABG


(4) S/P laparoscopic hysterectomy


(5) Status post femorotibial bypass











Family History





Bone cancer


  MOTHER


FH: COPD (chronic obstructive pulmonary disease)


  FATHER


Peripheral arterial disease


  FATHER





Social History


Smoking Status:  Former Smoker


Smokeless Tobacco Use:  No


Alcohol Use:  none


Drug Use:  none


Marital Status:  single, 


Occupation Status:  retired





Allergies


Coded Allergies:  


     Penicillins (Unverified  Allergy, Mild, SHORTNESS OF BREATH, 8/2/18)


     Clopidogrel (Unverified  Allergy, Unknown, RASH, 8/2/18)





Current Inpatient Medications





Current Inpatient Medications








 Medications


  (Trade)  Dose


 Ordered  Sig/Amparo


 Route  Start Time


 Stop Time Status Last Admin


Dose Admin


 


 Acetaminophen


  (Tylenol Tab)  1,000 mg  Q6  PRN


 PO  8/2/18 18:00


 9/1/18 17:59   


 


 


 Apixaban


  (Eliquis)  5 mg  BID


 PO  8/2/18 21:00


 9/1/18 20:59  8/3/18 08:36


5 MG


 


 Aspirin


  (Ecotrin Tab)  81 mg  DAILY


 PO  8/3/18 09:00


 9/2/18 08:59  8/3/18 08:35


81 MG


 


 Cilostazol


  (Pletal Tab)  100 mg  BID


 PO  8/2/18 21:00


 9/1/18 20:59  8/3/18 08:35


100 MG


 


 Citalopram


 Hydrobromide


  (celeXA TAB)  20 mg  DAILY


 PO  8/3/18 09:00


 9/2/18 08:59  8/3/18 08:35


20 MG


 


 Docusate Sodium


  (coLACE CAP)  100 mg  BID


 PO  8/2/18 21:00


 9/1/18 20:59  8/3/18 08:33


100 MG


 


 Donepezil HCl


  (Aricept Tab)  10 mg  DAILY


 PO  8/3/18 09:00


 9/2/18 08:59  8/3/18 08:34


10 MG


 


 Levothyroxine


 Sodium


  (Synthroid Tab)  88 mcg  DAILYBB


 PO  8/3/18 06:00


 9/2/18 06:59  8/3/18 05:31


88 MCG


 


 Memantine


  (Namenda Tab)  10 mg  BID


 PO  8/2/18 21:00


 9/1/18 20:59  8/3/18 08:35


10 MG


 


 Multivitamins


  (Multivitamin


 Tab)  1 tab  DAILY


 PO  8/3/18 09:00


 9/2/18 08:59  8/3/18 08:35


1 TAB


 


 Pantoprazole


 Sodium


  (Protonix Tab)  40 mg  DAILY


 PO  8/3/18 09:00


 9/2/18 08:59  8/3/18 08:33


40 MG


 


 Furosemide


  (Lasix Tab)  40 mg  DAILY


 PO  8/3/18 09:00


 9/2/18 08:59  8/3/18 08:34


40 MG


 


 Metoprolol


 Tartrate


  (Lopressor Tab)  25 mg  BID


 PO  8/2/18 21:00


 9/1/18 20:59  8/3/18 08:36


25 MG


 


 Mirtazapine


  (Remeron Tab)  7.5 mg  HS


 PO  8/2/18 21:00


 9/1/18 20:59  8/2/18 21:55


7.5 MG


 


 Rosuvastatin


 Calcium


  (Crestor Tab)  20 mg  HS


 PO  8/2/18 21:00


 9/1/18 20:59  8/2/18 21:53


20 MG


 


 Calcium/Vitamin D


  (Caltrate Plus


 Tab)  1 tab  DAILY


 PO  8/3/18 09:00


 9/2/18 08:59  8/3/18 08:34


1 TAB


 


 Polyethylene


  (Miralax Powder


 Packet)  17 gm  DAILY


 PO  8/3/18 09:00


 9/2/18 08:59  8/3/18 08:46


17 GM


 


 Miscellaneous


 Information


  (Consult


 Glycemic


 Management


 Pharmacy)  1 ea  UD  PRN


 N/A  8/2/18 19:42


 9/1/18 19:41   


 


 


 Miscellaneous


 Information


  (Consult)  1 ea  UD  PRN


 N/A  8/2/18 19:58


 9/1/18 19:57   


 


 


 Amlodipine


 Besylate


  (Norvasc Tab)  2.5 mg  DAILY


 PO  8/3/18 09:00


 9/2/18 08:59  8/3/18 08:35


2.5 MG


 


 Daptomycin


  (Consult)  1 ea  UD  PRN


 N/A  8/2/18 20:15


 9/1/18 20:14   


 


 


 Insulin Aspart


  (novoLOG ASPART)  **SLIDING


 SCALE**


 **G...  ACHS


 SC  8/2/18 21:00


 9/1/18 20:59  8/3/18 12:43


3 UNITS


 


 Daptomycin 400 mg/


 Syringe  8 ml @ 4


 mls/min  Q24H


 IV  8/2/18 22:00


 9/13/18 21:59  8/2/18 23:11


4 MLS/MIN


 


 Insulin Aspart


  (novoLOG INSULIN


 PUMP)  1 ea  ACHS


 N/A  8/2/18 21:00


 9/1/18 20:59  8/3/18 12:03


1 EA


 


 Insulin Aspart


  (novoLOG ASPART)  SLIDING


 SCALE  PRN  PRN


 SC  8/2/18 20:45


 9/1/18 20:44   


 


 


 Glucose


  (Glucose 40% Gel)  15-30


 GRAMS 15


 GRAMS...  UD  PRN


 PO  8/2/18 20:45


 9/1/18 20:44   


 


 


 Glucose


  (Glucose Chew


 Tab)  4-8


 Tablets 4


 Tabl...  UD  PRN


 PO  8/2/18 20:45


 9/1/18 20:44   


 


 


 Glucagon


  (Glucagon Inj)  1 mg  UD  PRN


 IM  8/2/18 20:45


 9/1/18 20:44   


 


 


 Dextrose


  (Dextrose 50%


 50ML Syringe)  25-50ML


 25ML FOR


 ...  UD  PRN


 IV  8/2/18 20:45


 9/1/18 20:44   


 


 


 Carbohydrates


  (Carbohydrates


 For Hypoglycemia)  15-30 GRAMS


 15 grams if


 BSG 54-69...  UD  PRN


 PO  8/2/18 20:45


 9/1/18 20:44   


 


 


 Gentamicin


 Sulfate 420 mg/


 Dextrose  110.5 ml @ 


 100 mls/hr  Q24H


 IV  8/3/18 20:00


 9/14/18 19:59   


 


 


 Enteral


 Nutritional


 Formula


  (Boost Glucose


 Control)  1 can  BIDM


 PO  8/3/18 17:45


 9/2/18 17:44   


 











Review of Systems


All systems were reviewed and are negative except as per HPI





Physical Exam











  Date Time  Temp Pulse Resp B/P (MAP) Pulse Ox O2 Delivery O2 Flow Rate FiO2


 


8/3/18 11:00      Room Air  


 


8/3/18 07:50      Room Air  


 


8/3/18 07:46 37.3 74 18 133/62 (85) 91 Room Air  


 


8/3/18 00:10      Room Air  


 


8/2/18 22:55 36.7 63 15 149/54 (85) 93 Room Air  


 


8/2/18 19:50 36.7 64 18 183/65  Room Air  


 


8/2/18 19:07  64  166/59 94 Room Air  


 


8/2/18 18:20 37.0 61 14 169/66 95 Room Air  


 


8/2/18 15:53 36.8 64  208/62 96 Room Air  





    174/117    








General Appearance:  WD/WN, no apparent distress


Head:  normocephalic, atraumatic


Eyes:  normal inspection, EOMI, sclerae normal


ENT:  normal ENT inspection, pharynx normal


Neck:  supple, no adenopathy, thyroid normal, trachea midline


Respiratory/Chest:  chest non-tender, lungs clear, normal breath sounds, no 

respiratory distress


Cardiovascular:  regular rate, rhythm, no gallop, no murmur


Abdomen/GI:  normal bowel sounds, non tender, soft, no organomegaly


Back:  normal inspection, no CVA tenderness


Extremities/Musculoskelatal:  no calf tenderness, no pedal edema, + slow 

capillary refill


Neurologic/Psych:  alert, normal mood/affect, oriented x 3


Skin:  normal color, no rash, + pertinent finding (Gangrenous right great toe 

with erythema and induration of foot)





Laboratory Results











 Date/Time


Source Procedure


Growth Status


 


 


 8/2/18 16:17


Blood Blood Culture


Pending Received


 


 8/2/18 15:45


Blood Blood Culture


Pending Received








Last 24 Hours








Test


  8/2/18


15:45 8/2/18


16:35 8/2/18


19:06 8/2/18


20:35


 


White Blood Count 7.15 K/uL    


 


Red Blood Count 3.48 M/uL    


 


Hemoglobin 10.4 g/dL    


 


Hematocrit 31.9 %    


 


Mean Corpuscular Volume 91.7 fL    


 


Mean Corpuscular Hemoglobin 29.9 pg    


 


Mean Corpuscular Hemoglobin


Concent 32.6 g/dl 


  


  


  


 


 


Platelet Count 364 K/uL    


 


Mean Platelet Volume 10.1 fL    


 


Neutrophils (%) (Auto) 67.8 %    


 


Lymphocytes (%) (Auto) 18.7 %    


 


Monocytes (%) (Auto) 10.1 %    


 


Eosinophils (%) (Auto) 2.2 %    


 


Basophils (%) (Auto) 0.6 %    


 


Neutrophils # (Auto) 4.85 K/uL    


 


Lymphocytes # (Auto) 1.34 K/uL    


 


Monocytes # (Auto) 0.72 K/uL    


 


Eosinophils # (Auto) 0.16 K/uL    


 


Basophils # (Auto) 0.04 K/uL    


 


RDW Standard Deviation 59.8 fL    


 


RDW Coefficient of Variation 17.9 %    


 


Immature Granulocyte % (Auto) 0.6 %    


 


Immature Granulocyte # (Auto) 0.04 K/uL    


 


Erythrocyte Sedimentation Rate 79 mm/hr    


 


Prothrombin Time 11.2 SECONDS    


 


Prothromb Time International


Ratio 1.1 


  


  


  


 


 


Activated Partial


Thromboplast Time 30.3 SECONDS 


  


  


  


 


 


Partial Thromboplastin Ratio 1.2    


 


Sodium Level 133 mmol/L    


 


Potassium Level 4.8 mmol/L    


 


Chloride Level 96 mmol/L    


 


Carbon Dioxide Level 32 mmol/L    


 


Anion Gap 5.0 mmol/L    


 


Blood Urea Nitrogen 25 mg/dl    


 


Creatinine 1.10 mg/dl    


 


Est Creatinine Clear Calc


Drug Dose 41.9 ml/min 


  


  


  


 


 


Estimated GFR (


American) 56.9 


  


  


  


 


 


Estimated GFR (Non-


American 49.1 


  


  


  


 


 


BUN/Creatinine Ratio 22.8    


 


Random Glucose 386 mg/dl    


 


Estimated Average Glucose 217 mg/dl    


 


Hemoglobin A1c 9.2 %    


 


Lactic Acid Level 1.5 mmol/L    


 


Calcium Level 9.0 mg/dl    


 


Total Bilirubin 0.2 mg/dl    


 


Aspartate Amino Transf


(AST/SGOT) 24 U/L 


  


  


  


 


 


Alanine Aminotransferase


(ALT/SGPT) 25 U/L 


  


  


  


 


 


Alkaline Phosphatase 154 U/L    


 


C-Reactive Protein 4.22 mg/dl    


 


Total Protein 7.0 gm/dl    


 


Albumin 2.5 gm/dl    


 


Globulin 4.5 gm/dl    


 


Albumin/Globulin Ratio 0.6    


 


Beta-Hydroxybutyric Acid 1.21 mg/dL    


 


Urine Color  YELLOW   


 


Urine Appearance  CLEAR   


 


Urine pH  7.0   


 


Urine Specific Gravity  1.022   


 


Urine Protein  NEG   


 


Urine Glucose (UA)  3+   


 


Urine Ketones  NEG   


 


Urine Occult Blood  NEG   


 


Urine Nitrite  NEG   


 


Urine Bilirubin  NEG   


 


Urine Urobilinogen  NEG   


 


Urine Leukocyte Esterase  NEG   


 


Bedside Glucose   176 mg/dl  131 mg/dl 


 


Test


  8/3/18


04:52 8/3/18


08:11 8/3/18


12:26 


 


 


White Blood Count 9.06 K/uL    


 


Red Blood Count 3.44 M/uL    


 


Hemoglobin 9.9 g/dL    


 


Hematocrit 31.2 %    


 


Mean Corpuscular Volume 90.7 fL    


 


Mean Corpuscular Hemoglobin 28.8 pg    


 


Mean Corpuscular Hemoglobin


Concent 31.7 g/dl 


  


  


  


 


 


Platelet Count 336 K/uL    


 


Mean Platelet Volume 9.4 fL    


 


Neutrophils (%) (Auto) 87.7 %    


 


Lymphocytes (%) (Auto) 5.4 %    


 


Monocytes (%) (Auto) 5.1 %    


 


Eosinophils (%) (Auto) 1.2 %    


 


Basophils (%) (Auto) 0.3 %    


 


Neutrophils # (Auto) 7.94 K/uL    


 


Lymphocytes # (Auto) 0.49 K/uL    


 


Monocytes # (Auto) 0.46 K/uL    


 


Eosinophils # (Auto) 0.11 K/uL    


 


Basophils # (Auto) 0.03 K/uL    


 


RDW Standard Deviation 59.1 fL    


 


RDW Coefficient of Variation 17.7 %    


 


Immature Granulocyte % (Auto) 0.3 %    


 


Immature Granulocyte # (Auto) 0.03 K/uL    


 


Sodium Level 137 mmol/L    


 


Potassium Level 4.3 mmol/L    


 


Chloride Level 104 mmol/L    


 


Carbon Dioxide Level 28 mmol/L    


 


Anion Gap 5.0 mmol/L    


 


Blood Urea Nitrogen 19 mg/dl    


 


Creatinine 0.93 mg/dl    


 


Est Creatinine Clear Calc


Drug Dose 49.5 ml/min 


  


  


  


 


 


Estimated GFR (


American) 69.7 


  


  


  


 


 


Estimated GFR (Non-


American 60.1 


  


  


  


 


 


BUN/Creatinine Ratio 19.8    


 


Random Glucose 123 mg/dl    


 


Calcium Level 8.3 mg/dl    


 


Total Bilirubin 0.4 mg/dl    


 


Aspartate Amino Transf


(AST/SGOT) 21 U/L 


  


  


  


 


 


Alanine Aminotransferase


(ALT/SGPT) 21 U/L 


  


  


  


 


 


Alkaline Phosphatase 135 U/L    


 


Total Protein 6.2 gm/dl    


 


Albumin 2.2 gm/dl    


 


Globulin 4.0 gm/dl    


 


Albumin/Globulin Ratio 0.6    


 


Random Gentamicin Level 4.90 mcg/ml    


 


Bedside Glucose  113 mg/dl  162 mg/dl  








                                                                               

                                                                 


Patient Name: APPLE DA SILVA                               Unit Number:  

[id number]                  


 








 











Dictated: 08/02/18 1657


 


Transcribed: 08/02/18 1657


 


ARG


 


Printed Date/Time: [~ rep prt dt]/[~ rep prt tm]








 [~ rep ct labl] - [~ rep ct ivnm]


 





   [facility]


 Radiology Department


 [place], [place] [address] [phone]





 











Dictated: 08/02/18 1657


 


Transcribed: 08/02/18 1657


 


ARG


 


Printed Date/Time: [~ rep prt dt]/[~ rep prt tm]








 [~ rep ct labl] - [~ rep ct ivnm]


 








CT RIGHT FOOT NO CONTRAST





CT DOSE: 168.45 mGy.cm





CLINICAL HISTORY: Right foot infection. Possible osteomyelitis.    





TECHNIQUE: Helical images were acquired in the transverse plane. Sagittal and


coronal reformatted images were acquired.  A dose lowering technique was


utilized adhering to the principles of ALARA.








COMPARISON STUDY:  None.





FINDINGS: The bones are osteopenic. No acute fractures are visualized. There is


no soft tissue gas.





There are vascular calcifications present.





There are no fluid collections to indicate an abscess.





There are no cortical destructive lesions to indicate osteomyelitis.





There is soft tissue edema most pronounced at the level of the first toe.





There are moderate arthritic changes in the subtalar joint.





IMPRESSION:  


1. No CT evidence of acute osteomyelitis. 











Electronically signed by:  Samuel Dailey M.D.


8/2/2018 5:02 PM





Dictated Date/Time:  8/2/2018 4:57 PM





 The status of this report is Signed.   


 Draft = Not yet reviewed or approved by Radiologist.  


 Signed = Reviewed and approved by Radiologist.


<AttendingPhy></AttendingPhy> <FamilyPhy>Jhonathan Mallory M.D.</FamilyPhy> <

PrimaryPhy>Jhonathan Mallory M.D.</PrimaryPhy> <UnitNumber>[id number]</UnitNumber> <

VisitNumber>[id number]</VisitNumber> <PatientName>APPLE DA SILVA</PatientName> 

<DateOfBirth>1945</DateOfBirth> <Location>[facility]</Location> <ServiceDate>08 /02/18</ServiceDate> <MNE>ESINDI</MNE> <OrderingPhy>Scott Dumont M.D.</

OrderingPhy> <OrderingPhyMNE>f rep ord dr healy</OrderingPhyMNE> <DictatingPhyMNE>

f rep dict dr healy</DictatingPhyMNE> <CCListMNE>f rep ct mne</CCListMNE> <

AdmittingPhyMNE>f pt admit dr healy</AdmittingPhyMNE> <AttendingPhyMNE>f pt 

attend dr healy</AttendingPhyMNE>


<ConsultingPhyMNE>f pt consult dr healy</ConsultingPhyMNE> <FamilyPhyMNE>f pt fam 

dr healy</FamilyPhyMNE> <OtherPhyMNE>f pt other dr healy</OtherPhyMNE> <

PrimaryPhyMNE>f pt prim care dr healy</PrimaryPhyMNE> <ReferringPhyMNE>f pt 

referring dr healy</ReferringPhyMNE>





Assessment & Plan


Gangrene of the right great toe with right foot infection in the setting of 

severe peripheral arterial disease status post revascularization.  Previously 

has grown highly resistant organisms including MRSA, ESBL producing E. coli, 

and highly resistant Pseudomonas.  Patient has been started on daptomycin, and 

I will add Zerbaxa to cover the gram negatives.  Await follow-up with vascular 

surgery, and will discuss the need for amputation with all involved.  Will 

follow.

## 2018-08-03 NOTE — PHARMACY PROGRESS NOTE
Pharmacy Abx Dose Progress Nt


Date of Service


Aug 3, 2018.





Pharmacy Dosing Scope


The patient is currently receiving the following antimicrobial agents per 

Pharmacy consult:


Gentamicin 420 mg IV every 24 hours and Daptomycin 400mg IV q24 hours for Foot 

infection positive with MRSA and pseudomonas





Objective


Height (Feet):  5


Height (Inches):  9.00


Weight (Kilograms):  60.000


Vital Signs (Past 12Hrs)





Vital Signs Past 12 Hours








  Date Time  Temp Pulse Resp B/P (MAP) Pulse Ox O2 Delivery O2 Flow Rate FiO2


 


8/3/18 11:00      Room Air  


 


8/3/18 07:50      Room Air  


 


8/3/18 07:46 37.3 74 18 133/62 (85) 91 Room Air  








Lab Results (24Hrs)





Laboratory Tests (24 Hours)








Test


  8/2/18


15:45 8/3/18


04:52


 


C-Reactive Protein


  4.22 mg/dl


(0-0.29)  H 


 


 


Erythrocyte Sedimentation Rate


  79 mm/hr


(0-21)  H 


 


 


Lactic Acid Level


  1.5 mmol/L


(0.4-2.0) 


 


 


White Blood Count


  


  9.06 K/uL


(4.8-10.8)


 


Red Blood Count


  


  3.44 M/uL


(4.2-5.4)  L


 


Hemoglobin


  


  9.9 g/dL


(12.0-16.0)  L


 


Hematocrit


  


  31.2 % (37-47)


L


 


Mean Corpuscular Volume


  


  90.7 fL


()


 


Mean Corpuscular Hemoglobin


  


  28.8 pg


(25-34)


 


Mean Corpuscular Hemoglobin


Concent 


  31.7 g/dl


(32-36)  L


 


Platelet Count


  


  336 K/uL


(130-400)


 


Mean Platelet Volume


  


  9.4 fL


(7.4-10.4)


 


Neutrophils (%) (Auto)  87.7 %  


 


Lymphocytes (%) (Auto)  5.4 %  


 


Monocytes (%) (Auto)  5.1 %  


 


Eosinophils (%) (Auto)  1.2 %  


 


Basophils (%) (Auto)  0.3 %  


 


Neutrophils # (Auto)


  


  7.94 K/uL


(1.4-6.5)  H


 


Lymphocytes # (Auto)


  


  0.49 K/uL


(1.2-3.4)  L


 


Monocytes # (Auto)


  


  0.46 K/uL


(0.11-0.59)


 


Eosinophils # (Auto)


  


  0.11 K/uL


(0-0.5)


 


Basophils # (Auto)


  


  0.03 K/uL


(0-0.2)








Micro Results











 Date/Time


Source Procedure


Growth Status


 


 


 8/2/18 16:17


Blood Blood Culture


Pending Received


 


 8/2/18 15:45


Blood Blood Culture


Pending Received











Risk Factors for Resistance





* History of infection with a multidrug-resistant organism:  MRSA and 

Pseudomonas in Right Heel.  


* Antimicrobial use within the last 90 days: on bactrim long term for 

suppression due to Hx of MRSA infection





Assessment & Plan











Item Value  Date Time


 


Random Gentamicin Level 4.90 mcg/ml 8/3/18 0452














Assessment





73  year old female receiving Gentamicin and Daptomycin for treatment of foot 

infection positive for MRSA and pseudomonas


Day #2 of antimicrobial therapy





Plan





Gentamicin


* Patient meets criteria for extended-interval aminoglycoside dosing per the 

[place] nomogram


* Random level of 4.9 mcg/ml drawn 10 hours after start of infusion indicates a 

dosing interval of every 24 hours.


* Continue 420 mg (7 mg/kg) every 24 hours.


* Dosage based on actual body weight of 60kg.


* Another  level will be drawn after a few days if patient is still on current 

regimen








Pharmacy will continue to follow and will adjust dose/frequency as necessary.  

Thank you.

## 2018-08-04 VITALS
HEART RATE: 63 BPM | OXYGEN SATURATION: 97 % | SYSTOLIC BLOOD PRESSURE: 136 MMHG | TEMPERATURE: 97.88 F | DIASTOLIC BLOOD PRESSURE: 71 MMHG

## 2018-08-04 VITALS
TEMPERATURE: 97.88 F | DIASTOLIC BLOOD PRESSURE: 58 MMHG | SYSTOLIC BLOOD PRESSURE: 137 MMHG | HEART RATE: 63 BPM | OXYGEN SATURATION: 96 %

## 2018-08-04 VITALS
SYSTOLIC BLOOD PRESSURE: 164 MMHG | HEART RATE: 63 BPM | TEMPERATURE: 98.42 F | DIASTOLIC BLOOD PRESSURE: 67 MMHG | OXYGEN SATURATION: 93 %

## 2018-08-04 VITALS — OXYGEN SATURATION: 93 %

## 2018-08-04 LAB
BUN SERPL-MCNC: 21 MG/DL (ref 7–18)
CALCIUM SERPL-MCNC: 8.5 MG/DL (ref 8.5–10.1)
CO2 SERPL-SCNC: 29 MMOL/L (ref 21–32)
CREAT SERPL-MCNC: 1.07 MG/DL (ref 0.6–1.2)
EOSINOPHIL NFR BLD AUTO: 333 K/UL (ref 130–400)
GLUCOSE SERPL-MCNC: 106 MG/DL (ref 70–99)
HCT VFR BLD CALC: 30 % (ref 37–47)
HGB BLD-MCNC: 9.5 G/DL (ref 12–16)
MCH RBC QN AUTO: 28.7 PG (ref 25–34)
MCHC RBC AUTO-ENTMCNC: 31.7 G/DL (ref 32–36)
MCV RBC AUTO: 90.6 FL (ref 80–100)
PMV BLD AUTO: 10 FL (ref 7.4–10.4)
POTASSIUM SERPL-SCNC: 4.1 MMOL/L (ref 3.5–5.1)
RED CELL DISTRIBUTION WIDTH CV: 18 % (ref 11.5–14.5)
RED CELL DISTRIBUTION WIDTH SD: 59.4 FL (ref 36.4–46.3)
SODIUM SERPL-SCNC: 137 MMOL/L (ref 136–145)
WBC # BLD AUTO: 6.21 K/UL (ref 4.8–10.8)

## 2018-08-04 RX ADMIN — INSULIN ASPART SCH EA: 100 INJECTION, SOLUTION INTRAVENOUS; SUBCUTANEOUS at 12:00

## 2018-08-04 RX ADMIN — METOPROLOL TARTRATE SCH MG: 25 TABLET, FILM COATED ORAL at 22:13

## 2018-08-04 RX ADMIN — CITALOPRAM HYDROBROMIDE SCH MG: 20 TABLET ORAL at 09:06

## 2018-08-04 RX ADMIN — CEFTOLOZANE AND TAZOBACTAM SCH MLS/HR: 1; .5 INJECTION, POWDER, LYOPHILIZED, FOR SOLUTION INTRAVENOUS at 09:01

## 2018-08-04 RX ADMIN — APIXABAN SCH MG: 5 TABLET, FILM COATED ORAL at 09:05

## 2018-08-04 RX ADMIN — CILOSTAZOL SCH MG: 100 TABLET ORAL at 22:10

## 2018-08-04 RX ADMIN — INSULIN ASPART SCH UNITS: 100 INJECTION, SOLUTION INTRAVENOUS; SUBCUTANEOUS at 18:02

## 2018-08-04 RX ADMIN — APIXABAN SCH MG: 5 TABLET, FILM COATED ORAL at 22:10

## 2018-08-04 RX ADMIN — MEMANTINE HYDROCHLORIDE SCH MG: 10 TABLET ORAL at 09:05

## 2018-08-04 RX ADMIN — METOPROLOL TARTRATE SCH MG: 25 TABLET, FILM COATED ORAL at 09:05

## 2018-08-04 RX ADMIN — FUROSEMIDE SCH MG: 40 TABLET ORAL at 09:06

## 2018-08-04 RX ADMIN — DOCUSATE SODIUM SCH MG: 100 CAPSULE, LIQUID FILLED ORAL at 22:12

## 2018-08-04 RX ADMIN — ROSUVASTATIN CALCIUM SCH MG: 20 TABLET, FILM COATED ORAL at 22:10

## 2018-08-04 RX ADMIN — PANTOPRAZOLE SCH MG: 40 TABLET, DELAYED RELEASE ORAL at 09:05

## 2018-08-04 RX ADMIN — Medication SCH CAN: at 18:04

## 2018-08-04 RX ADMIN — INSULIN ASPART SCH UNITS: 100 INJECTION, SOLUTION INTRAVENOUS; SUBCUTANEOUS at 22:17

## 2018-08-04 RX ADMIN — INSULIN ASPART SCH EA: 100 INJECTION, SOLUTION INTRAVENOUS; SUBCUTANEOUS at 08:00

## 2018-08-04 RX ADMIN — INSULIN ASPART SCH UNITS: 100 INJECTION, SOLUTION INTRAVENOUS; SUBCUTANEOUS at 13:40

## 2018-08-04 RX ADMIN — INSULIN ASPART SCH UNITS: 100 INJECTION, SOLUTION INTRAVENOUS; SUBCUTANEOUS at 09:17

## 2018-08-04 RX ADMIN — Medication SCH GM: at 09:06

## 2018-08-04 RX ADMIN — Medication SCH MG: at 09:06

## 2018-08-04 RX ADMIN — INSULIN ASPART SCH EA: 100 INJECTION, SOLUTION INTRAVENOUS; SUBCUTANEOUS at 17:15

## 2018-08-04 RX ADMIN — Medication SCH TAB: at 09:05

## 2018-08-04 RX ADMIN — Medication SCH CAN: at 09:05

## 2018-08-04 RX ADMIN — CILOSTAZOL SCH MG: 100 TABLET ORAL at 09:06

## 2018-08-04 RX ADMIN — AMLODIPINE BESYLATE SCH MG: 5 TABLET ORAL at 09:06

## 2018-08-04 RX ADMIN — CEFTOLOZANE AND TAZOBACTAM SCH MLS/HR: 1; .5 INJECTION, POWDER, LYOPHILIZED, FOR SOLUTION INTRAVENOUS at 00:40

## 2018-08-04 RX ADMIN — DONEPEZIL HYDROCHLORIDE SCH MG: 10 TABLET, FILM COATED ORAL at 09:07

## 2018-08-04 RX ADMIN — DOCUSATE SODIUM SCH MG: 100 CAPSULE, LIQUID FILLED ORAL at 09:05

## 2018-08-04 RX ADMIN — LEVOTHYROXINE SODIUM SCH MCG: 88 TABLET ORAL at 05:54

## 2018-08-04 RX ADMIN — MIRTAZAPINE SCH MG: 15 TABLET, FILM COATED ORAL at 22:12

## 2018-08-04 RX ADMIN — DAPTOMYCIN SCH MLS/MIN: 50 INJECTION, POWDER, LYOPHILIZED, FOR SOLUTION INTRAVENOUS at 22:08

## 2018-08-04 RX ADMIN — INSULIN ASPART SCH UNITS: 100 INJECTION, SOLUTION INTRAVENOUS; SUBCUTANEOUS at 23:56

## 2018-08-04 RX ADMIN — CEFTOLOZANE AND TAZOBACTAM SCH MLS/HR: 1; .5 INJECTION, POWDER, LYOPHILIZED, FOR SOLUTION INTRAVENOUS at 15:45

## 2018-08-04 RX ADMIN — INSULIN ASPART SCH EA: 100 INJECTION, SOLUTION INTRAVENOUS; SUBCUTANEOUS at 21:00

## 2018-08-04 RX ADMIN — MEMANTINE HYDROCHLORIDE SCH MG: 10 TABLET ORAL at 22:09

## 2018-08-04 NOTE — PHARMACY PROGRESS NOTE
Pharmacy Glycemic Short Note 2


Date of Service


Aug 4, 2018.





OUTPATIENT ANTIDIABETIC REGIMEN: 


* NovoLog SQ insulin pump


 * Basal Rate = 0.525 units/hr


 * Bolus per parameters:


 * Goal range = 110-150 mg/dl


 * CF = 60 mg/dl/unit


 * CR = 1 unit for every 18g CHO consumed











Item Value  Date Time


 


Bedside Glucose 176 mg/dl H 8/2/18 1906


 


Bedside Glucose 131 mg/dl H 8/2/18 2035














Bedside Glucose 113 mg/dl H 8/3/18 0811


 


Bedside Glucose 162 mg/dl H 8/3/18 1226


 


Bedside Glucose 265 mg/dl H 8/3/18 1700


 


Bedside Glucose 230 mg/dl H 8/3/18 2039


 


  


 


Bedside Glucose 116 mg/dl H 8/4/18 0813

















ASSESSMENT:


* 74yo T1DM female maintained on SQ insulin pump with inadequate control. Goal 

A1c closer to 8% based on age/co-morbidities. A1c is 9.4% on 8/2/18


* [facility] spoke with patient's daughter. Daughter manages insulin boluses per pump 

as an outpatient. Pt is not able to bolus herself for hyperglycemia or CHO 

consumed. Daughter requested that we continue the basal rate on the pump and 

bolus with NovoLog SQ


* Typically we do not like this "hybrid" plan as it runs the risk of double/

over bolusing. However, plan discussed in detail with family and nursing and it 

has been working well for the past 24hrs. 


* BSGs well controlled with current regimen


 * BSGs trending upwards throughout the day after meals indicating more CHO 

coverage needed. Will adjust NovoLog scale accordingly. 


 * Patient receiving 4-9 units of insulin with meals in addition to her insulin 

pump which is providing basal insulin only. 


 * AM fasting BSG in goal range indicating adequate basal insulin dosing with 

pump


 











PLAN FOR INPATIENT GLYCEMIC CONTROL: 


* Basal insulin: no change 


 * Per SQ insulin pump outpatient settings





* Bolus insulin: nursing to administer SQ NovoLog for meal time coverage since 

patient cannot bolus herself from pump. Will tighten CR for more better post-

prandial control. 


 * NovoLog per scale ACHS or Q6hrs while NPO


 * Goal Range:  Low 100 mg/dL - High 160 mg/dL


 * Correction Factor:  45 mg/dL/unit


 * Nutritional / Prandial insulin per carb ratio of  1 unit per 13 grams CHO 

consumed

## 2018-08-04 NOTE — CARDIOLOGY CONSULTATION
DATE OF CONSULTATION:  08/03/2018

 

CARDIOLOGY CONSULT

 

CONSULTATION REQUESTED BY:  Dr. Headley.

 

REASON FOR CONSULTATION:  Peripheral arterial disease.

 

HISTORY OF PRESENT ILLNESS:  Ms. Reyes is a very pleasant 73-year-old woman

known to me from the outpatient setting and recent hospitalization for

endovascular intervention to her severe peripheral arterial disease who was

readmitted in the setting of lower extremity ulceration and infection

requiring IV antibiotics.

 

Patient was recently hospitalized last week when presented for elective

bilateral lower extremity angiogram.  Patient was found to have an occluded

left SFA stents with a patent femoral, tibial bypass graft without

significant distal runoff on the right.  She was noted to have sequential

severe SFA/popliteal lesions and distal tibial vessel disease with patent

1-vessel peroneal runoff to the foot.  She underwent balloon angioplasty to

her SFA/popliteal artery with a 5 x 150 mm [employer] drug-eluting balloon

with good angiographic result and was discharged home the day of procedure.

 

Since that time states that her leg has felt well.  Denies fevers, chills or

other systemic symptoms.  She was seen at the wound clinic by Dr. Stone

yesterday where there was concern for progression in her first toe gangrene

and right heel ulceration due to multidrug resistant wound cultures was

admitted for IV antibiotics.

 

Today, patient states that she is feeling well.  Denies significant pain in

her foot.  Denies fevers, chills.  No other concerns.

 

PAST MEDICAL HISTORY:

1.  Peripheral arterial disease previously cared for by Dr. Nails at [facility], underwent her left femoral-tibial bypass 02/2018 which was

complicated by poor wound healing, requiring muscle flap.

2.  Poorly-controlled insulin-dependent diabetes.

3.  Peripheral neuropathy.

4.  Paroxysmal atrial fibrillation on anticoagulation.

5.  Coronary artery disease status post CABG.

6.  Hypertension.

7.  Dyslipidemia.

8.  Prior amputation of the first toe on her left foot.

 

FAMILY HISTORY:  Noncontributory based on patient's age.

 

SOCIAL HISTORY:  Currently lives with daughter.  She quit smoking in 1981. 

Denies significant alcohol or illicit drugs.

 

REVIEW OF SYSTEMS:  Ten-point review of systems completed and otherwise

negative unless mentioned in the HPI.

 

PHYSICAL EXAMINATION:

VITAL SIGNS:  Temperature 36.9, pulse 69, blood pressure 119/66.

GENERAL:  Patient appears comfortable, no acute distress.

HEENT:  Sclerae are anicteric.  Oropharynx is clear.

LUNGS:  Clear to auscultation bilaterally.

CARDIAC:  She is regular with no appreciable murmurs.

ABDOMEN:  Soft, nontender.

EXTREMITIES:  Warm.  She had faint to 1+ palpable DP and PT pulses on the

right lower extremity _____.  Her first digit is consistent with dry gangrene

with no surrounding erythema or drainage.  Right heel recently dressed with

no surrounding erythema or induration.

 

LABORATORY DATA:  White blood cell count 9, hemoglobin of 10, platelet count

of 336.  Sodium 137, potassium 4.3, BUN 18, creatinine 0.9.

 

IMPRESSION AND PLAN:

1.  Severe peripheral arterial disease status post recent right superficial

femoral artery/popliteal balloon angioplasty.

2.  Right lower extremity ulceration with first digit gangrene and right heel

ulceration.

3.  Poorly-controlled type 2 diabetes complicated by peripheral neuropathy.

4.  Hypertension.

5.  Coronary artery disease status post coronary artery bypass grafting.

6.  Atrial fibrillation on anticoagulation.

 

Patient's right lower extremity appears well perfused today on exam with 1+

pulses, and distal pulses improved from recent discharge 1 week ago.

 

At this point, patient's first digit is likely nonsalvageable and will

likely require amputation.  However, I feel that perfusion to the foot should

be adequate for wound healing with possible surgery and hopefully sufficient

for continued healing of right heel ulceration while on appropriate

antibiotics.   As such, at this time, do not recommend any additional

vascular testing or intervention.  Would consider involving orthopedics for

possible surgical evaluation of right first digit.  Otherwise, no change to

current cardiac regimen and ASCVD risk factor modification.

 

We will follow up as an outpatient.

 

Please contact with any questions.

 

 

 

SELAM

## 2018-08-04 NOTE — PROGRESS NOTE
Medicine Progress Note


Date & Time of Visit:


Aug 4, 2018 at 09:40


.


Subjective





CC: 


Follow-up visit for gangrene / cellulitis right foot and other problems.





HPI: 


Feels well.


No fever.


No foot pain.





ROS:


General- no fever, no chills


Resp- no cough; no shortness of breath


Cardiac-  no chest pain, no edema


GI- no nausea, no vomiting, no diarrhea, no constipation


- no dysuria


.





Objective





Last 8 Hrs








  Date Time  Temp Pulse Resp B/P (MAP) Pulse Ox O2 Delivery O2 Flow Rate FiO2


 


8/4/18 15:40 36.6 63 16 136/71 (92) 97 Room Air  


 


8/4/18 15:15     93 Room Air  








Physical Exam:





General- sitting in chair, no distress


Lungs- clear to auscultation; no respiratory distress


Cardiovascular- RRR; III/VI systolic murmur LSB; no gallop; no JVD; no 

pretibial edema


Abdomen- + bowel sounds, soft, nontender 


Extremities- no cyanosis; no calf tenderness; right foot bandaged


Neuro- alert, mild confusion


Skin- warm & dry


.


Laboratory Results:





Last 24 Hours








Test


  8/4/18


06:12 8/4/18


08:13 8/4/18


12:07 8/4/18


16:43


 


White Blood Count 6.21 K/uL    


 


Red Blood Count 3.31 M/uL    


 


Hemoglobin 9.5 g/dL    


 


Hematocrit 30.0 %    


 


Mean Corpuscular Volume 90.6 fL    


 


Mean Corpuscular Hemoglobin 28.7 pg    


 


Mean Corpuscular Hemoglobin


Concent 31.7 g/dl 


  


  


  


 


 


RDW Standard Deviation 59.4 fL    


 


RDW Coefficient of Variation 18.0 %    


 


Platelet Count 333 K/uL    


 


Mean Platelet Volume 10.0 fL    


 


Sodium Level 137 mmol/L    


 


Potassium Level 4.1 mmol/L    


 


Chloride Level 102 mmol/L    


 


Carbon Dioxide Level 29 mmol/L    


 


Anion Gap 6.0 mmol/L    


 


Blood Urea Nitrogen 21 mg/dl    


 


Creatinine 1.07 mg/dl    


 


Est Creatinine Clear Calc


Drug Dose 43.0 ml/min 


  


  


  


 


 


Estimated GFR (


American) 58.8 


  


  


  


 


 


Estimated GFR (Non-


American 50.7 


  


  


  


 


 


BUN/Creatinine Ratio 19.7    


 


Random Glucose 106 mg/dl    


 


Calcium Level 8.5 mg/dl    


 


Total Creatine Kinase 23 U/L    


 


Bedside Glucose  116 mg/dl  244 mg/dl  377 mg/dl 


 


Test


  8/4/18


16:45 8/4/18


20:23 


  


 


 


Bedside Glucose 416 mg/dl  418 mg/dl   











Assessment & Plan





(1) Non-healing wound of right heel


This is a 73-year-old white female with a significant past medical history of 

type 1 insulin-dependent diabetes mellitus, CAD status post prior CABG, PAD 

status post recent revascularization, hypothyroidism, hypertension, 

hyperlipidemia, PAF, diabetic polyneuropathy, GERD, CKD stage III who presents 

to [facility] where she was referred from ID Dr. Stone 

secondary to MDR wound culture requiring IV antibiotics. In ED initial 

evaluation revealed WBC 7.15, hemoglobin 10.4, hematocrit 31.9, platelets 264, 

sodium 133, potassium 4.8, chloride 96, BUN/creatinine 25, 1.10, glucose 386.  

She was given a dose of IV Vanco, IV gentamicin, 10 units of IV regular insulin 

secondary to hypoglycemia.  ESR, CRP elevated at 79 and 4.8 respectively.  Her 

lactic acid was 1.5.  CT scan right foot revealed soft tissue edema, no 

evidence of osteomyelitis.  She is being admitted for IV antibiotic therapy.





-Admit to med/surg


-consult ID Dr. Guevara for IV antibiotics Vancomycin/Gentamicin per pharmacy


-consult Cardiology Dr. Levy who did most recent angioplasty to RLE on 7/26/18 

s/p R SFA/popliteal angioplasty with drug-eluting balloon


-consult wound care nurse for wound management, also add heel suspension boots 

to prevent further breakdown


-consult pharmacy for glycemic management due to T1DM insulin pump


-repeat CBC, BMP in a.m.


-neuropathy precautions





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


Plan as above





(3) Severe peripheral arterial disease


Status post right SFA/popliteal angioplasty with drug-eluting balloon by Dr. Levy on 7/26/18


-Continue ASA, statin, Pletal therapy





(4) Type 1 diabetes mellitus


Last A1c on 5/20/18 was 7.9


Patient has insulin pump, uses 0.5 units per basal dosing, daughter initiate 

bolus dosing


Will defer insulin pump management to glycemic pharmacist





Hyperglycemic in ED S/p IV regular insulin. 


Recheck blood glucose was 176





(5) CAD (coronary artery disease)


With history of CABG


Currently without CP/SOB


Continue risk reduction medical regimen including ASA, statin, beta-blocker.  

She has allergy to Ace/Arbs





(6) Paroxysmal atrial fibrillation


Continue metoprolol for rate and rhythm control


Continue Eliquis for thrombotic control





(7) Hypertension


Continue Toprol and low dose amlodipine


Blood pressure has been elevated in ED, most recent 166/59 per Markus at 1919


Will need to monitor this closely.








(8) Hyperlipidemia


Continue Crestor


Last lipid panel on 5/20/18 revealed total cholesterol 134, LDL 82, HDL 63, 

triglyceride 47





(9) Hypothyroid


Continue to replete with levothyroxine


Last TSH 1.64 5/20/18





(10) Atherosclerotic dementia


Continue Namenda, Aricept


She is on Remeron at at bedtime for appetite stimulation





(11) Type 1 DM with CKD stage 3 and hypertension


(12) Diabetic peripheral neuropathy associated with type 1 diabetes mellitus


neuropathy precautions





(13) GERD (gastroesophageal reflux disease)


Continue protonix





(14) Hx MRSA infection


On long term bactrim therapy for suppression


Will hold this while on IV Vanco. 








CELLULITIS RIGHT FOOT / GANGRENE RIGHT GREAT TOE 


Continue IV antibiotics.





CAD


No anginal symptoms.





AF


Continue metoprolol and apixaban.





HYPERTENSION


Continue metoprolol and amlodipine.





PERIPHERAL VASCULAR DISEASE


Interventional Cardiology consulted,.





DM TYPE 1


Insulin pump.


Blood sugars typically fluctuate.


Pharmacy consulted.





DEMENTIA


Monitor for delirium.





MRSA


Contact precautions.





VTE PROPHYLAXIS


Apixaban.





RESUSCITATION STATUS


DNR





DISPOSITION


To be determined.


Family Medicine follow-up with Dr. Mallory.


.


Current Inpatient Medications:





Current Inpatient Medications








 Medications


  (Trade)  Dose


 Ordered  Sig/Amparo


 Route  Start Time


 Stop Time Status Last Admin


Dose Admin


 


 Acetaminophen


  (Tylenol Tab)  1,000 mg  Q6  PRN


 PO  8/2/18 18:00


 9/1/18 17:59   


 


 


 Apixaban


  (Eliquis)  5 mg  BID


 PO  8/2/18 21:00


 9/1/18 20:59  8/4/18 22:10


5 MG


 


 Aspirin


  (Ecotrin Tab)  81 mg  DAILY


 PO  8/3/18 09:00


 9/2/18 08:59  8/4/18 09:06


81 MG


 


 Cilostazol


  (Pletal Tab)  100 mg  BID


 PO  8/2/18 21:00


 9/1/18 20:59  8/4/18 22:10


100 MG


 


 Citalopram


 Hydrobromide


  (celeXA TAB)  20 mg  DAILY


 PO  8/3/18 09:00


 9/2/18 08:59  8/4/18 09:06


20 MG


 


 Docusate Sodium


  (coLACE CAP)  100 mg  BID


 PO  8/2/18 21:00


 9/1/18 20:59  8/4/18 22:12


100 MG


 


 Donepezil HCl


  (Aricept Tab)  10 mg  DAILY


 PO  8/3/18 09:00


 9/2/18 08:59  8/4/18 09:07


10 MG


 


 Levothyroxine


 Sodium


  (Synthroid Tab)  88 mcg  DAILYBB


 PO  8/3/18 06:00


 9/2/18 06:59  8/4/18 05:54


88 MCG


 


 Memantine


  (Namenda Tab)  10 mg  BID


 PO  8/2/18 21:00


 9/1/18 20:59  8/4/18 22:09


10 MG


 


 Multivitamins


  (Multivitamin


 Tab)  1 tab  DAILY


 PO  8/3/18 09:00


 9/2/18 08:59  8/4/18 09:05


1 TAB


 


 Pantoprazole


 Sodium


  (Protonix Tab)  40 mg  DAILY


 PO  8/3/18 09:00


 9/2/18 08:59  8/4/18 09:05


40 MG


 


 Furosemide


  (Lasix Tab)  40 mg  DAILY


 PO  8/3/18 09:00


 9/2/18 08:59  8/4/18 09:06


40 MG


 


 Metoprolol


 Tartrate


  (Lopressor Tab)  25 mg  BID


 PO  8/2/18 21:00


 9/1/18 20:59  8/4/18 22:13


25 MG


 


 Mirtazapine


  (Remeron Tab)  7.5 mg  HS


 PO  8/2/18 21:00


 9/1/18 20:59  8/4/18 22:12


7.5 MG


 


 Rosuvastatin


 Calcium


  (Crestor Tab)  20 mg  HS


 PO  8/2/18 21:00


 9/1/18 20:59  8/4/18 22:10


20 MG


 


 Calcium/Vitamin D


  (Caltrate Plus


 Tab)  1 tab  DAILY


 PO  8/3/18 09:00


 9/2/18 08:59  8/4/18 09:05


1 TAB


 


 Polyethylene


  (Miralax Powder


 Packet)  17 gm  DAILY


 PO  8/3/18 09:00


 9/2/18 08:59  8/4/18 09:06


17 GM


 


 Miscellaneous


 Information


  (Consult


 Glycemic


 Management


 Pharmacy)  1 ea  UD  PRN


 N/A  8/2/18 19:42


 9/1/18 19:41   


 


 


 Amlodipine


 Besylate


  (Norvasc Tab)  2.5 mg  DAILY


 PO  8/3/18 09:00


 9/2/18 08:59  8/4/18 09:06


2.5 MG


 


 Daptomycin


  (Consult)  1 ea  UD  PRN


 N/A  8/2/18 20:15


 9/1/18 20:14   


 


 


 Insulin Aspart


  (novoLOG ASPART)  **SLIDING


 SCALE**


 **G...  ACHS


 SC  8/2/18 21:00


 9/1/18 20:59  8/4/18 22:17


6 UNITS


 


 Daptomycin 400 mg/


 Syringe  8 ml @ 4


 mls/min  Q24H


 IV  8/2/18 22:00


 9/13/18 21:59  8/4/18 22:08


4 MLS/MIN


 


 Insulin Aspart


  (novoLOG INSULIN


 PUMP)  1 ea  ACHS


 N/A  8/2/18 21:00


 9/1/18 20:59  8/4/18 21:00


1 EA


 


 Insulin Aspart


  (novoLOG ASPART)  SLIDING


 SCALE  PRN  PRN


 SC  8/2/18 20:45


 9/1/18 20:44   


 


 


 Glucose


  (Glucose 40% Gel)  15-30


 GRAMS 15


 GRAMS...  UD  PRN


 PO  8/2/18 20:45


 9/1/18 20:44   


 


 


 Glucose


  (Glucose Chew


 Tab)  4-8


 Tablets 4


 Tabl...  UD  PRN


 PO  8/2/18 20:45


 9/1/18 20:44   


 


 


 Glucagon


  (Glucagon Inj)  1 mg  UD  PRN


 IM  8/2/18 20:45


 9/1/18 20:44   


 


 


 Dextrose


  (Dextrose 50%


 50ML Syringe)  25-50ML


 25ML FOR


 ...  UD  PRN


 IV  8/2/18 20:45


 9/1/18 20:44   


 


 


 Carbohydrates


  (Carbohydrates


 For Hypoglycemia)  15-30 GRAMS


 15 grams if


 BSG 54-69...  UD  PRN


 PO  8/2/18 20:45


 9/1/18 20:44   


 


 


 Enteral


 Nutritional


 Formula


  (Boost Glucose


 Control)  1 can  BIDM


 PO  8/3/18 17:45


 9/2/18 17:44  8/4/18 18:04


1 CAN


 


 Ceftolozane/


 Tazobactam 1.5 gm/


 Dextrose  111.4 ml @ 


 111.4 mls/


 hr  Q8H


 IV  8/3/18 16:00


 8/13/18 15:59  8/4/18 15:45


111.4 MLS/HR


 


 Diphenhydramine


 HCl


  (Benadryl Cap)  25 mg  BID  PRN


 PO  8/3/18 16:30


 9/2/18 16:29   


 


 


 Insulin Aspart


  (novoLOG ASPART)  **SLIDING


 SCALE**


 **G...  TODAY@0000,0400


 SC  8/5/18 00:00


 8/5/18 04:01

## 2018-08-05 VITALS
OXYGEN SATURATION: 98 % | DIASTOLIC BLOOD PRESSURE: 57 MMHG | SYSTOLIC BLOOD PRESSURE: 163 MMHG | TEMPERATURE: 97.7 F | HEART RATE: 73 BPM

## 2018-08-05 VITALS
HEART RATE: 61 BPM | DIASTOLIC BLOOD PRESSURE: 54 MMHG | SYSTOLIC BLOOD PRESSURE: 167 MMHG | OXYGEN SATURATION: 95 % | TEMPERATURE: 97.88 F

## 2018-08-05 VITALS
DIASTOLIC BLOOD PRESSURE: 61 MMHG | HEART RATE: 59 BPM | OXYGEN SATURATION: 97 % | SYSTOLIC BLOOD PRESSURE: 147 MMHG | TEMPERATURE: 97.52 F

## 2018-08-05 LAB
BUN SERPL-MCNC: 28 MG/DL (ref 7–18)
CALCIUM SERPL-MCNC: 8.5 MG/DL (ref 8.5–10.1)
CO2 SERPL-SCNC: 28 MMOL/L (ref 21–32)
CREAT SERPL-MCNC: 0.86 MG/DL (ref 0.6–1.2)
EOSINOPHIL NFR BLD AUTO: 331 K/UL (ref 130–400)
GLUCOSE SERPL-MCNC: 175 MG/DL (ref 70–99)
HCT VFR BLD CALC: 29 % (ref 37–47)
HGB BLD-MCNC: 9.4 G/DL (ref 12–16)
MCH RBC QN AUTO: 29.2 PG (ref 25–34)
MCHC RBC AUTO-ENTMCNC: 32.4 G/DL (ref 32–36)
MCV RBC AUTO: 90.1 FL (ref 80–100)
PMV BLD AUTO: 9.8 FL (ref 7.4–10.4)
POTASSIUM SERPL-SCNC: 4.3 MMOL/L (ref 3.5–5.1)
RED CELL DISTRIBUTION WIDTH CV: 17.8 % (ref 11.5–14.5)
RED CELL DISTRIBUTION WIDTH SD: 58.7 FL (ref 36.4–46.3)
SODIUM SERPL-SCNC: 136 MMOL/L (ref 136–145)
WBC # BLD AUTO: 6.41 K/UL (ref 4.8–10.8)

## 2018-08-05 RX ADMIN — PANTOPRAZOLE SCH MG: 40 TABLET, DELAYED RELEASE ORAL at 09:41

## 2018-08-05 RX ADMIN — APIXABAN SCH MG: 5 TABLET, FILM COATED ORAL at 21:30

## 2018-08-05 RX ADMIN — Medication SCH GM: at 09:00

## 2018-08-05 RX ADMIN — MIRTAZAPINE SCH MG: 15 TABLET, FILM COATED ORAL at 21:30

## 2018-08-05 RX ADMIN — Medication SCH TAB: at 09:42

## 2018-08-05 RX ADMIN — LEVOTHYROXINE SODIUM SCH MCG: 88 TABLET ORAL at 05:44

## 2018-08-05 RX ADMIN — CITALOPRAM HYDROBROMIDE SCH MG: 20 TABLET ORAL at 09:41

## 2018-08-05 RX ADMIN — DOCUSATE SODIUM SCH MG: 100 CAPSULE, LIQUID FILLED ORAL at 21:31

## 2018-08-05 RX ADMIN — INSULIN ASPART SCH EA: 100 INJECTION, SOLUTION INTRAVENOUS; SUBCUTANEOUS at 17:15

## 2018-08-05 RX ADMIN — MEMANTINE HYDROCHLORIDE SCH MG: 10 TABLET ORAL at 09:40

## 2018-08-05 RX ADMIN — CEFTOLOZANE AND TAZOBACTAM SCH MLS/HR: 1; .5 INJECTION, POWDER, LYOPHILIZED, FOR SOLUTION INTRAVENOUS at 09:38

## 2018-08-05 RX ADMIN — INSULIN ASPART SCH UNITS: 100 INJECTION, SOLUTION INTRAVENOUS; SUBCUTANEOUS at 09:51

## 2018-08-05 RX ADMIN — CEFTOLOZANE AND TAZOBACTAM SCH MLS/HR: 1; .5 INJECTION, POWDER, LYOPHILIZED, FOR SOLUTION INTRAVENOUS at 23:43

## 2018-08-05 RX ADMIN — INSULIN ASPART SCH EA: 100 INJECTION, SOLUTION INTRAVENOUS; SUBCUTANEOUS at 21:00

## 2018-08-05 RX ADMIN — INSULIN ASPART SCH UNITS: 100 INJECTION, SOLUTION INTRAVENOUS; SUBCUTANEOUS at 13:12

## 2018-08-05 RX ADMIN — AMLODIPINE BESYLATE SCH MG: 5 TABLET ORAL at 09:40

## 2018-08-05 RX ADMIN — INSULIN ASPART SCH EA: 100 INJECTION, SOLUTION INTRAVENOUS; SUBCUTANEOUS at 07:28

## 2018-08-05 RX ADMIN — CILOSTAZOL SCH MG: 100 TABLET ORAL at 09:41

## 2018-08-05 RX ADMIN — Medication SCH CAN: at 17:45

## 2018-08-05 RX ADMIN — FUROSEMIDE SCH MG: 40 TABLET ORAL at 09:41

## 2018-08-05 RX ADMIN — DONEPEZIL HYDROCHLORIDE SCH MG: 10 TABLET, FILM COATED ORAL at 09:40

## 2018-08-05 RX ADMIN — METOPROLOL TARTRATE SCH MG: 25 TABLET, FILM COATED ORAL at 09:41

## 2018-08-05 RX ADMIN — CILOSTAZOL SCH MG: 100 TABLET ORAL at 21:30

## 2018-08-05 RX ADMIN — DAPTOMYCIN SCH MLS/MIN: 50 INJECTION, POWDER, LYOPHILIZED, FOR SOLUTION INTRAVENOUS at 21:32

## 2018-08-05 RX ADMIN — MEMANTINE HYDROCHLORIDE SCH MG: 10 TABLET ORAL at 21:31

## 2018-08-05 RX ADMIN — INSULIN ASPART SCH EA: 100 INJECTION, SOLUTION INTRAVENOUS; SUBCUTANEOUS at 12:00

## 2018-08-05 RX ADMIN — APIXABAN SCH MG: 5 TABLET, FILM COATED ORAL at 09:41

## 2018-08-05 RX ADMIN — Medication SCH MG: at 09:40

## 2018-08-05 RX ADMIN — INSULIN ASPART SCH UNITS: 100 INJECTION, SOLUTION INTRAVENOUS; SUBCUTANEOUS at 04:00

## 2018-08-05 RX ADMIN — Medication SCH CAN: at 08:30

## 2018-08-05 RX ADMIN — METOPROLOL TARTRATE SCH MG: 25 TABLET, FILM COATED ORAL at 21:30

## 2018-08-05 RX ADMIN — CEFTOLOZANE AND TAZOBACTAM SCH MLS/HR: 1; .5 INJECTION, POWDER, LYOPHILIZED, FOR SOLUTION INTRAVENOUS at 16:19

## 2018-08-05 RX ADMIN — Medication SCH TAB: at 09:40

## 2018-08-05 RX ADMIN — DOCUSATE SODIUM SCH MG: 100 CAPSULE, LIQUID FILLED ORAL at 09:40

## 2018-08-05 RX ADMIN — INSULIN ASPART SCH UNITS: 100 INJECTION, SOLUTION INTRAVENOUS; SUBCUTANEOUS at 18:26

## 2018-08-05 RX ADMIN — CEFTOLOZANE AND TAZOBACTAM SCH MLS/HR: 1; .5 INJECTION, POWDER, LYOPHILIZED, FOR SOLUTION INTRAVENOUS at 00:05

## 2018-08-05 NOTE — PROGRESS NOTE
Medicine Progress Note


Date & Time of Visit:


Aug 5, 2018 at 10:50


.


Subjective





CC: 


Follow-up visit for gangrene / cellulitis right foot and other problems.





HPI: 


Doing well.


No fever or foot pain.





ROS:


General- no fever, no chills


Resp- no cough; no shortness of breath


Cardiac-  no chest pain, no edema


GI- no nausea, no vomiting, no diarrhea


- no dysuria


.





Objective





Last 8 Hrs








  Date Time  Temp Pulse Resp B/P (MAP) Pulse Ox O2 Delivery O2 Flow Rate FiO2


 


8/5/18 16:00      Room Air  


 


8/5/18 15:05 36.5 73 18 163/57 (92) 98 Room Air  








Physical Exam:





General- sitting in chair, no distress


Lungs- clear to auscultation; no respiratory distress


Cardiovascular- RRR; III/VI systolic murmur LSB; no gallop; no JVD; no 

pretibial edema


Abdomen- + bowel sounds, soft, nontender 


Extremities- no cyanosis; no calf tenderness; right foot bandaged


Neuro- alert


Skin- warm & dry


.


Laboratory Results:





Last 24 Hours








Test


  8/4/18


23:49 8/5/18


03:56 8/5/18


04:15 8/5/18


04:39


 


Bedside Glucose 148 mg/dl  59 mg/dl  64 mg/dl  113 mg/dl 


 


Test


  8/5/18


06:28 8/5/18


08:11 8/5/18


12:10 8/5/18


16:53


 


White Blood Count 6.41 K/uL    


 


Red Blood Count 3.22 M/uL    


 


Hemoglobin 9.4 g/dL    


 


Hematocrit 29.0 %    


 


Mean Corpuscular Volume 90.1 fL    


 


Mean Corpuscular Hemoglobin 29.2 pg    


 


Mean Corpuscular Hemoglobin


Concent 32.4 g/dl 


  


  


  


 


 


RDW Standard Deviation 58.7 fL    


 


RDW Coefficient of Variation 17.8 %    


 


Platelet Count 331 K/uL    


 


Mean Platelet Volume 9.8 fL    


 


Sodium Level 136 mmol/L    


 


Potassium Level 4.3 mmol/L    


 


Chloride Level 101 mmol/L    


 


Carbon Dioxide Level 28 mmol/L    


 


Anion Gap 7.0 mmol/L    


 


Blood Urea Nitrogen 28 mg/dl    


 


Creatinine 0.86 mg/dl    


 


Est Creatinine Clear Calc


Drug Dose 53.5 ml/min 


  


  


  


 


 


Estimated GFR (


American) 76.6 


  


  


  


 


 


Estimated GFR (Non-


American 66.1 


  


  


  


 


 


BUN/Creatinine Ratio 32.3    


 


Random Glucose 175 mg/dl    


 


Calcium Level 8.5 mg/dl    


 


Bedside Glucose  169 mg/dl  317 mg/dl  247 mg/dl 











Assessment & Plan





(1) Non-healing wound of right heel


This is a 73-year-old white female with a significant past medical history of 

type 1 insulin-dependent diabetes mellitus, CAD status post prior CABG, PAD 

status post recent revascularization, hypothyroidism, hypertension, 

hyperlipidemia, PAF, diabetic polyneuropathy, GERD, CKD stage III who presents 

to [facility] where she was referred from ID Dr. Stone 

secondary to MDR wound culture requiring IV antibiotics. In ED initial 

evaluation revealed WBC 7.15, hemoglobin 10.4, hematocrit 31.9, platelets 264, 

sodium 133, potassium 4.8, chloride 96, BUN/creatinine 25, 1.10, glucose 386.  

She was given a dose of IV Vanco, IV gentamicin, 10 units of IV regular insulin 

secondary to hypoglycemia.  ESR, CRP elevated at 79 and 4.8 respectively.  Her 

lactic acid was 1.5.  CT scan right foot revealed soft tissue edema, no 

evidence of osteomyelitis.  She is being admitted for IV antibiotic therapy.





-Admit to med/surg


-consult ID Dr. Guevara for IV antibiotics Vancomycin/Gentamicin per pharmacy


-consult Cardiology Dr. Levy who did most recent angioplasty to RLE on 7/26/18 

s/p R SFA/popliteal angioplasty with drug-eluting balloon


-consult wound care nurse for wound management, also add heel suspension boots 

to prevent further breakdown


-consult pharmacy for glycemic management due to T1DM insulin pump


-repeat CBC, BMP in a.m.


-neuropathy precautions





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


Plan as above





(3) Severe peripheral arterial disease


Status post right SFA/popliteal angioplasty with drug-eluting balloon by Dr. Levy on 7/26/18


-Continue ASA, statin, Pletal therapy





(4) Type 1 diabetes mellitus


Last A1c on 5/20/18 was 7.9


Patient has insulin pump, uses 0.5 units per basal dosing, daughter initiate 

bolus dosing


Will defer insulin pump management to glycemic pharmacist





Hyperglycemic in ED S/p IV regular insulin. 


Recheck blood glucose was 176





(5) CAD (coronary artery disease)


With history of CABG


Currently without CP/SOB


Continue risk reduction medical regimen including ASA, statin, beta-blocker.  

She has allergy to Ace/Arbs





(6) Paroxysmal atrial fibrillation


Continue metoprolol for rate and rhythm control


Continue Eliquis for thrombotic control





(7) Hypertension


Continue Toprol and low dose amlodipine


Blood pressure has been elevated in ED, most recent 166/59 per Markus at 1919


Will need to monitor this closely.








(8) Hyperlipidemia


Continue Crestor


Last lipid panel on 5/20/18 revealed total cholesterol 134, LDL 82, HDL 63, 

triglyceride 47





(9) Hypothyroid


Continue to replete with levothyroxine


Last TSH 1.64 5/20/18





(10) Atherosclerotic dementia


Continue Namenda, Aricept


She is on Remeron at at bedtime for appetite stimulation





(11) Type 1 DM with CKD stage 3 and hypertension


(12) Diabetic peripheral neuropathy associated with type 1 diabetes mellitus


neuropathy precautions





(13) GERD (gastroesophageal reflux disease)


Continue protonix





(14) Hx MRSA infection


On long term bactrim therapy for suppression


Will hold this while on IV Vanco. 








CELLULITIS RIGHT FOOT / GANGRENE RIGHT GREAT TOE 


Outpatient cultures grew MRSA and Pseudomonas.


ID consulted.


Receiving IV antibiotic therapy with daptomycin and ceftolozane / tazobactam.


Afebrile.


Will probably need amputation of right great toe.


Consult Orthopedic Surgery.





CAD


No anginal symptoms.


Continue aspirin, metoprolol, amlodipine.





AF


Continue metoprolol and apixaban.





HYPERTENSION


Continue metoprolol and amlodipine.





PERIPHERAL VASCULAR DISEASE


Interventional Cardiology consulted,.





DM TYPE 1


Insulin pump.


Blood sugars typically fluctuate.


Pharmacy consulted.





DYSLIPIDEMIA


Hold statin while receiving daptomycin.





DEMENTIA


Monitor for delirium.





MRSA


Contact precautions.





VTE PROPHYLAXIS


Apixaban.





RESUSCITATION STATUS


DNR





DISPOSITION


To be determined.


Family Medicine follow-up with Dr. Mallory.


.


Current Inpatient Medications:





Current Inpatient Medications








 Medications


  (Trade)  Dose


 Ordered  Sig/Amparo


 Route  Start Time


 Stop Time Status Last Admin


Dose Admin


 


 Acetaminophen


  (Tylenol Tab)  1,000 mg  Q6  PRN


 PO  8/2/18 18:00


 9/1/18 17:59   


 


 


 Apixaban


  (Eliquis)  5 mg  BID


 PO  8/2/18 21:00


 9/1/18 20:59  8/5/18 09:41


5 MG


 


 Aspirin


  (Ecotrin Tab)  81 mg  DAILY


 PO  8/3/18 09:00


 9/2/18 08:59  8/5/18 09:40


81 MG


 


 Cilostazol


  (Pletal Tab)  100 mg  BID


 PO  8/2/18 21:00


 9/1/18 20:59  8/5/18 09:41


100 MG


 


 Citalopram


 Hydrobromide


  (celeXA TAB)  20 mg  DAILY


 PO  8/3/18 09:00


 9/2/18 08:59  8/5/18 09:41


20 MG


 


 Docusate Sodium


  (coLACE CAP)  100 mg  BID


 PO  8/2/18 21:00


 9/1/18 20:59  8/5/18 09:40


100 MG


 


 Donepezil HCl


  (Aricept Tab)  10 mg  DAILY


 PO  8/3/18 09:00


 9/2/18 08:59  8/5/18 09:40


10 MG


 


 Levothyroxine


 Sodium


  (Synthroid Tab)  88 mcg  DAILYBB


 PO  8/3/18 06:00


 9/2/18 06:59  8/5/18 05:44


88 MCG


 


 Memantine


  (Namenda Tab)  10 mg  BID


 PO  8/2/18 21:00


 9/1/18 20:59  8/5/18 09:40


10 MG


 


 Multivitamins


  (Multivitamin


 Tab)  1 tab  DAILY


 PO  8/3/18 09:00


 9/2/18 08:59  8/5/18 09:42


1 TAB


 


 Pantoprazole


 Sodium


  (Protonix Tab)  40 mg  DAILY


 PO  8/3/18 09:00


 9/2/18 08:59  8/5/18 09:41


40 MG


 


 Furosemide


  (Lasix Tab)  40 mg  DAILY


 PO  8/3/18 09:00


 9/2/18 08:59  8/5/18 09:41


40 MG


 


 Metoprolol


 Tartrate


  (Lopressor Tab)  25 mg  BID


 PO  8/2/18 21:00


 9/1/18 20:59  8/5/18 09:41


25 MG


 


 Mirtazapine


  (Remeron Tab)  7.5 mg  HS


 PO  8/2/18 21:00


 9/1/18 20:59  8/4/18 22:12


7.5 MG


 


 Rosuvastatin


 Calcium


  (Crestor Tab)  20 mg  HS


 PO  8/2/18 21:00


 9/1/18 20:59  8/4/18 22:10


20 MG


 


 Calcium/Vitamin D


  (Caltrate Plus


 Tab)  1 tab  DAILY


 PO  8/3/18 09:00


 9/2/18 08:59  8/5/18 09:40


1 TAB


 


 Polyethylene


  (Miralax Powder


 Packet)  17 gm  DAILY


 PO  8/3/18 09:00


 9/2/18 08:59  8/4/18 09:06


17 GM


 


 Miscellaneous


 Information


  (Consult


 Glycemic


 Management


 Pharmacy)  1 ea  UD  PRN


 N/A  8/2/18 19:42


 9/1/18 19:41   


 


 


 Amlodipine


 Besylate


  (Norvasc Tab)  2.5 mg  DAILY


 PO  8/3/18 09:00


 9/2/18 08:59  8/5/18 09:40


2.5 MG


 


 Daptomycin


  (Consult)  1 ea  UD  PRN


 N/A  8/2/18 20:15


 9/1/18 20:14   


 


 


 Insulin Aspart


  (novoLOG ASPART)  **SLIDING


 SCALE**


 **G...  ACHS


 SC  8/2/18 21:00


 9/1/18 20:59 Future hold 8/5/18 18:26


10 UNITS


 


 Daptomycin 400 mg/


 Syringe  8 ml @ 4


 mls/min  Q24H


 IV  8/2/18 22:00


 9/13/18 21:59  8/4/18 22:08


4 MLS/MIN


 


 Insulin Aspart


  (novoLOG INSULIN


 PUMP)  1 ea  ACHS


 N/A  8/2/18 21:00


 9/1/18 20:59  8/5/18 17:15


1 EA


 


 Insulin Aspart


  (novoLOG ASPART)  SLIDING


 SCALE  PRN  PRN


 SC  8/2/18 20:45


 9/1/18 20:44   


 


 


 Glucose


  (Glucose 40% Gel)  15-30


 GRAMS 15


 GRAMS...  UD  PRN


 PO  8/2/18 20:45


 9/1/18 20:44   


 


 


 Glucose


  (Glucose Chew


 Tab)  4-8


 Tablets 4


 Tabl...  UD  PRN


 PO  8/2/18 20:45


 9/1/18 20:44   


 


 


 Glucagon


  (Glucagon Inj)  1 mg  UD  PRN


 IM  8/2/18 20:45


 9/1/18 20:44   


 


 


 Dextrose


  (Dextrose 50%


 50ML Syringe)  25-50ML


 25ML FOR


 ...  UD  PRN


 IV  8/2/18 20:45


 9/1/18 20:44   


 


 


 Carbohydrates


  (Carbohydrates


 For Hypoglycemia)  15-30 GRAMS


 15 grams if


 BSG 54-69...  UD  PRN


 PO  8/2/18 20:45


 9/1/18 20:44  8/5/18 04:23


15 GM


 


 Enteral


 Nutritional


 Formula


  (Boost Glucose


 Control)  1 can  BIDM


 PO  8/3/18 17:45


 9/2/18 17:44  8/5/18 17:45


1 CAN


 


 Ceftolozane/


 Tazobactam 1.5 gm/


 Dextrose  111.4 ml @ 


 111.4 mls/


 hr  Q8H


 IV  8/3/18 16:00


 8/13/18 15:59  8/5/18 16:19


111.4 MLS/HR


 


 Diphenhydramine


 HCl


  (Benadryl Cap)  25 mg  BID  PRN


 PO  8/3/18 16:30


 9/2/18 16:29   


 


 


 Insulin Aspart


  (novoLOG ASPART)  **SLIDING


 SCALE**


 **G...  TODAY@2100


 SC  8/5/18 21:00


 8/5/18 23:59   


 


 


 Insulin Aspart


  (novoLOG ASPART)  **SLIDING


 SCALE**


 **G...  TODAY@0200


 SC  8/6/18 02:00


 8/6/18 02:01

## 2018-08-05 NOTE — PHARMACY PROGRESS NOTE
Pharmacy Glycemic Short Note 2


Date of Service


Aug 5, 2018.





OUTPATIENT ANTIDIABETIC REGIMEN: 


* NovoLog SQ insulin pump


 * Basal Rate = 0.525 units/hr


 * Bolus per parameters:


 * Goal range = 110-150 mg/dl


 * CF = 60 mg/dl/unit


 * CR = 1 unit for every 18g CHO consumed











Item Value  Date Time


 


Bedside Glucose 176 mg/dl H 8/2/18 1906


 


Bedside Glucose 131 mg/dl H 8/2/18 2035














Bedside Glucose 113 mg/dl H 8/3/18 0811


 


Bedside Glucose 162 mg/dl H 8/3/18 1226


 


Bedside Glucose 265 mg/dl H 8/3/18 1700


 


Bedside Glucose 230 mg/dl H 8/3/18 2039














Item Value  Date Time


 


Bedside Glucose 116 mg/dl H 8/4/18 0813


 


Bedside Glucose 244 mg/dl H 8/4/18 1207


 


Bedside Glucose 377 mg/dl *H 8/4/18 1643


 


Bedside Glucose 416 mg/dl *H 8/4/18 1645


 


Bedside Glucose 418 mg/dl *H 8/4/18 2023


 


Bedside Glucose 148 mg/dl H 8/4/18 2349


 


Bedside Glucose 59 mg/dl *L 8/5/18 0356


 


Bedside Glucose 64 mg/dl *L 8/5/18 0415


 


Bedside Glucose 113 mg/dl H 8/5/18 0439


 


Bedside Glucose 169 mg/dl H 8/5/18 0811











ASSESSMENT:


* 72yo T1DM female maintained on SQ insulin pump with inadequate control. Goal 

A1c closer to 8% based on age/co-morbidities. A1c is 9.4% on 8/2/18


* [facility] spoke with patient's daughter. Daughter manages insulin boluses per pump 

as an outpatient. Pt is not able to bolus herself for hyperglycemia or CHO 

consumed. Daughter requested that we continue the basal rate on the pump and 

bolus with NovoLog SQ


* Typically we do not like this "hybrid" plan as it runs the risk of double/

over bolusing. However, plan discussed in detail with family and nursing and it 

has been working well for admission


* Unfortunately, patient with suboptimal control over the past 24hrs


 * Pt with SEVERE hyperglycemia yesterday secondary to large CHO intake. Pt had 

114g CHO with breakfast and 100 CHO with dinner. CHO were covered accordingly 

with ordered parameters. Current CR is actually more aggressive than outpatient 

dosing and was further tightened yesterday morning. Despite this, severe 

hyperglycemia occurred. [facility] contacted patient's daughter who manages boluses. 

She said that these high BSGs are very typically and not to over correct or 

else she will go low. No change were made last evening. We just bolused her 

according to current parameters. We checked BSG at 0000 & 0400 (no coverage was 

given/needed). But, pt was hypoglycemic at 0400. 


 * It seems that the best way to prevent this from happening again would be to 

limit her CHO intake. Can achieve this by switching her diet from a type 1 

diabetic diet (unrestricted carb meals) to a type 2 diabetic diet (carb 

restricted based on age and weight) 





 











PLAN FOR INPATIENT GLYCEMIC CONTROL: 


* Basal insulin: no change 


 * Per SQ insulin pump outpatient settings





* Bolus insulin: nursing to administer SQ NovoLog for meal time coverage since 

patient cannot bolus herself from pump. No changes to current regimen 


 * NovoLog per scale ACHS or Q6hrs while NPO


 * Goal Range:  Low 100 mg/dL - High 160 mg/dL


 * Correction Factor:  45 mg/dL/unit


 * Nutritional / Prandial insulin per carb ratio of  1 unit per 13 grams CHO 

consumed





* Diet:


 * Change from a type 1 diabetes diet to a type 2 diabetes diet 


 * This will restrict CHO to ~ 75g/meal and prevent the large CHO intake (+100g 

CHO/meal) like yesterday which caused severe hyperglycemia followed by 

hypoglycemia.

## 2018-08-05 NOTE — ORTHOPEDIC CONSULTATION
DATE OF CONSULTATION:  08/05/2018

 

This is a 73-year-old female who was seen at the request of Dr. Headley

regarding right foot great toe and fifth toe gangrene.  Apparently, the

patient has multiple medical comorbidities and also recent revascularization

of fem-tibial bypass on the right in March 2018.  She had chronic right heel

wound, great toe gangrene and necrosis and fifth gangrene and necrosis which

underwent attempted revascularization procedure.  She was seen by the wound

clinic and infectious disease.  Wound culture of the right heel grew MRSA

positive specimen with multidrug resistant pseudomonas also requiring IV

antibiotic therapy.  She is referred for admission.  Patient has no pain,

fever, chills, nausea, vomiting, diarrhea, chest pain or shortness of breath.

 She has had chronic diabetic polyneuropathy secondary to type 1

insulin-requiring diabetes mellitus for many years.  The patient was seen in

the Emergency Department and evaluated by the hospitalist and admitted for

admission and IV antibiotic therapy and further care and management.

 

PAST MEDICAL HISTORY:  Atherosclerotic dementia, coronary artery disease,

Charcot foot, diabetic peripheral neuropathy, type 1 diabetic polyneuropathy,

gastroesophageal reflux disease, diabetic heel ulcer, hyperlipidemia,

hypertension, hypothyroidism, paroxysmal atrial fibrillation, type 1 diabetes

mellitus insulin requiring, CKD stage III.

 

PAST SURGICAL HISTORY:  Amputation great toe left foot, appendectomy, CABG,

laparoscopic hysterectomy, femoral-tibial bypass.

 

ALLERGIES:  PENICILLINS, SHORTNESS OF BREATH; PLAVIX, RASH.

 

MEDICATIONS:  Please note the extensive list of medical record.

 

SOCIAL HISTORY:  She is a former smoker, 2 packs per day for 30 years, quit

in 1981.  Denies smokeless tobacco.  Denies alcohol or drug use.  She is

, lives with her daughter, Rupinder and she is retired.

 

PHYSICAL EXAMINATION:

GENERAL:  This is a pleasant 73-year-old female, sitting supine in her

hospital room bedside chair.  She is alert and oriented x3.  Speech clear and

fluent.  Affect is appropriate.  She wears glasses.  She does require some

redirection and repetition of questions so she gains understanding and then

answers appropriately.  She did admit to forgetting things and that was the

reason why she moved in with her daughter approximately 7 months ago.

EXTREMITIES:  Examination of the lower extremities demonstrates skin of the

lower extremity is warm, dry and intact.  She has 2/4 edema bilateral lower

extremities.  She has scant hair growth.  Dorsalis pedis and posterior pulses

are nonpalpable; however, the feet are warm and cap refill is approximately 3

seconds.  She has dense neuropathy in bilateral feet extending from the toes

proximal to the hindfoot approximately at the level of the malleoli and the

heels and she has moderate sensation to light touch.  She has a minimal

pinprick sensation to the distal toes.  There is obvious dry gangrene of the

right great toe extending from the distal tip to approximately the mid

proximal phalanx.  The distal toe has a firm woody consistency.  No cap

refill to distal tip.  There is zone demarcation approximately at mid

phalanx.  She has no tenderness to palpation up to the level of the first

metatarsophalangeal joint.  The fifth toe distal tip extending approximately

6-7 mm is also evidence of dry gangrene that is asensate distally.  This is

zone of demarcation approximately at the level of the distal interphalangeal

joint.  She has limited sensation of the fifth toe.  The rest of the foot

demonstrates features consistent with neuropathy, bilateral feet.  Imaging

reviewed includes a CT scan demonstrating soft tissue changes, however, no

clear evidence of osteomyelitis of the fifth toe and the first toe, right

foot.

 

LABORATORY DATA:  Reviewed.

 

IMPRESSION:

1.  Right great toe dry gangrene.

2.  Dry gangrene of the fifth toe distal tip.

3.  Peripheral vascular disease.

4.  Diabetic polyneuropathy.

5.  Possibility of osteomyelitis of distal right great toe without features

supported on CT scan.

 

RECOMMENDATION:

1.  Continue IV antibiotics.

2.  When deemed suitable for surgery, would recommend amputation of the right

great toe approximately at the level of the first metatarsophalangeal joint

if soft tissue flap coverage will support.

3.  Amputation of the right fifth toe at the distal interphalangeal joint or

slightly proximal for soft tissue flap coverage as appropriate.

4.  Nonweightbearing right lower extremity due to significant heel

ulceration.

5.  Supportive debridement of right posterior heel ulceration at time of

amputation of the first and fifth toes.

 

We will follow with you.

 

Thank you for the opportunity to consult in the care of this patient.

## 2018-08-06 VITALS
HEART RATE: 67 BPM | OXYGEN SATURATION: 95 % | TEMPERATURE: 97.7 F | DIASTOLIC BLOOD PRESSURE: 49 MMHG | SYSTOLIC BLOOD PRESSURE: 165 MMHG

## 2018-08-06 VITALS
TEMPERATURE: 97.34 F | OXYGEN SATURATION: 97 % | DIASTOLIC BLOOD PRESSURE: 76 MMHG | HEART RATE: 98 BPM | SYSTOLIC BLOOD PRESSURE: 146 MMHG

## 2018-08-06 VITALS
HEART RATE: 75 BPM | DIASTOLIC BLOOD PRESSURE: 65 MMHG | OXYGEN SATURATION: 96 % | TEMPERATURE: 97.7 F | SYSTOLIC BLOOD PRESSURE: 168 MMHG

## 2018-08-06 VITALS — SYSTOLIC BLOOD PRESSURE: 96 MMHG | DIASTOLIC BLOOD PRESSURE: 55 MMHG | HEART RATE: 66 BPM

## 2018-08-06 VITALS — OXYGEN SATURATION: 95 %

## 2018-08-06 RX ADMIN — AMLODIPINE BESYLATE SCH MG: 5 TABLET ORAL at 08:24

## 2018-08-06 RX ADMIN — DAPTOMYCIN SCH MLS/MIN: 50 INJECTION, POWDER, LYOPHILIZED, FOR SOLUTION INTRAVENOUS at 21:32

## 2018-08-06 RX ADMIN — CITALOPRAM HYDROBROMIDE SCH MG: 20 TABLET ORAL at 08:23

## 2018-08-06 RX ADMIN — APIXABAN SCH MG: 5 TABLET, FILM COATED ORAL at 08:20

## 2018-08-06 RX ADMIN — Medication SCH GM: at 08:23

## 2018-08-06 RX ADMIN — FUROSEMIDE SCH MG: 40 TABLET ORAL at 08:24

## 2018-08-06 RX ADMIN — LEVOTHYROXINE SODIUM SCH MCG: 88 TABLET ORAL at 05:40

## 2018-08-06 RX ADMIN — INSULIN ASPART SCH UNITS: 100 INJECTION, SOLUTION INTRAVENOUS; SUBCUTANEOUS at 12:49

## 2018-08-06 RX ADMIN — CEFTOLOZANE AND TAZOBACTAM SCH MLS/HR: 1; .5 INJECTION, POWDER, LYOPHILIZED, FOR SOLUTION INTRAVENOUS at 08:22

## 2018-08-06 RX ADMIN — METOPROLOL TARTRATE SCH MG: 25 TABLET, FILM COATED ORAL at 08:26

## 2018-08-06 RX ADMIN — DONEPEZIL HYDROCHLORIDE SCH MG: 10 TABLET, FILM COATED ORAL at 08:25

## 2018-08-06 RX ADMIN — CILOSTAZOL SCH MG: 100 TABLET ORAL at 08:20

## 2018-08-06 RX ADMIN — METOPROLOL TARTRATE SCH MG: 25 TABLET, FILM COATED ORAL at 20:35

## 2018-08-06 RX ADMIN — APIXABAN SCH MG: 5 TABLET, FILM COATED ORAL at 20:30

## 2018-08-06 RX ADMIN — INSULIN ASPART SCH UNITS: 100 INJECTION, SOLUTION INTRAVENOUS; SUBCUTANEOUS at 05:39

## 2018-08-06 RX ADMIN — DOCUSATE SODIUM SCH MG: 100 CAPSULE, LIQUID FILLED ORAL at 20:30

## 2018-08-06 RX ADMIN — PANTOPRAZOLE SCH MG: 40 TABLET, DELAYED RELEASE ORAL at 08:26

## 2018-08-06 RX ADMIN — CILOSTAZOL SCH MG: 100 TABLET ORAL at 20:30

## 2018-08-06 RX ADMIN — MEMANTINE HYDROCHLORIDE SCH MG: 10 TABLET ORAL at 20:32

## 2018-08-06 RX ADMIN — Medication SCH CAN: at 17:45

## 2018-08-06 RX ADMIN — Medication SCH CAN: at 08:25

## 2018-08-06 RX ADMIN — MEMANTINE HYDROCHLORIDE SCH MG: 10 TABLET ORAL at 08:24

## 2018-08-06 RX ADMIN — DOCUSATE SODIUM SCH MG: 100 CAPSULE, LIQUID FILLED ORAL at 08:25

## 2018-08-06 RX ADMIN — INSULIN ASPART SCH EA: 100 INJECTION, SOLUTION INTRAVENOUS; SUBCUTANEOUS at 20:31

## 2018-08-06 RX ADMIN — Medication SCH TAB: at 08:23

## 2018-08-06 RX ADMIN — MIRTAZAPINE SCH MG: 15 TABLET, FILM COATED ORAL at 20:30

## 2018-08-06 RX ADMIN — INSULIN ASPART SCH UNITS: 100 INJECTION, SOLUTION INTRAVENOUS; SUBCUTANEOUS at 18:19

## 2018-08-06 RX ADMIN — CEFTOLOZANE AND TAZOBACTAM SCH MLS/HR: 1; .5 INJECTION, POWDER, LYOPHILIZED, FOR SOLUTION INTRAVENOUS at 16:37

## 2018-08-06 RX ADMIN — Medication SCH MG: at 08:20

## 2018-08-06 RX ADMIN — Medication SCH TAB: at 08:26

## 2018-08-06 NOTE — DIAGNOSTIC IMAGING REPORT
CT OF THE CHEST WITHOUT IV CONTRAST



CLINICAL HISTORY: Pneumonia versus lung mass.    



COMPARISON STUDY:  Chest radiograph August 6, 2018. 



CT DOSE: 207.46 mGycm



TECHNIQUE:  Axial images of the chest were obtained without IV contrast.  Images

were reviewed in the axial, sagittal, and coronal planes. IV contrast was not

administered for this examination.  A dose lowering technique was utilized

adhering to the principles of ALARA.





FINDINGS:  No enlarged axillary, mediastinal or hilar lymph nodes are present.

Heart is moderately enlarged. There are median sternotomy wires and postsurgical

findings consistent with bypass grafting. There is no pericardial effusion. No

pneumothorax or pleural effusion is noted. Lingular opacity is noted. There is

also a 2.3 x 1.4 cm nodular right middle lobe opacity shown on axial image 200

of 326. There are numerous pulmonary nodules, many of which have irregular

margins. These include a 1 cm right upper lobe nodule shown on image 117 and an

8 mm left upper lobe nodule shown on image 125. No cavitary lesions are present.

Bony thorax is unremarkable. Visualized portions of the upper abdomen

demonstrate extensive atherosclerotic calcification.



IMPRESSION:  



1. Right middle lobe and lingular opacities. The findings may reflect an

infectious etiology such as atypical mycobacterial infection. A 2.3 x 1.4 cm

nodular right middle lobe opacity is indeterminate. Although an

infectious/inflammatory process is favored, a neoplastic process could appear

similar and a follow-up chest CT in one month is recommended. Alternately,

bronchoscopy could be performed.



2. Numerous ill-defined nodules throughout the lungs. An infectious/inflammatory

process is favored however metastatic disease could appear similar. These

nodules should be assessed on follow-up chest CT.



3. No thoracic lymphadenopathy.



4. Moderate cardiomegaly and extensive coronary artery calcification.







Electronically signed by:  Jake Valles M.D.

8/6/2018 9:13 AM



Dictated Date/Time:  8/6/2018 8:59 AM

## 2018-08-06 NOTE — PHARMACY PROGRESS NOTE
Glycemic: Assessment & Plan


Date of Service


Aug 6, 2018.





Assessment & Plan


UPDATED Assessment:


* Pt's Novolog insulin pump had been put on hold and partial dose Lantus 7 

units given last evening to avoid AM hypoglycemia. Pt was to be an "add on" for 

toe amputation surgery today. Pt received Regular insulin 5 units midday to 

bridge gap and provide some light BASAL coverage for the npo status and 

awaiting surgery. Novolog was also given to cover pt's correction factor for BSG

=320mg/dL.


* Surgery is now cancelled for today, so pt was ordered AHA, Type-I dinner for 

tonite.


* Surgery rescheduled for Tuesday, 8/7 at after 1600 or 1700. Patient is to 

have a light breakfast and be NPO afterwards.





UPDATED Plan:


* Will resume pt's insulin pump to provide BASAL insulin needs ONLY. This plan 

was reviewed with the pt's RN and also pt's daughter as she helps manage her 

insulin needs at home. The daughter will refill pt's insulin pump and insert a 

new site prior to leaving for the evening. I have dispensed a new Novolog vial 

to use to refill her pump.


* Nursing will cover BSGs with ordered Correction Factor and Carb Ratio:


 * Ordered CF 45mg/dL/unit & carb ratio 13 grams CHO/unit for BREAKFAST/LUNCH/

DINNER


 * Ordered CR **ONLY** at BEDTIME to cover CHO's consumed. CR=13 grams CHO/unit


* Will follow up in AM to determine plan after breakfast to prepare for surgery 

in late afternoon.





Pharmacy will continue to monitor patient daily and write orders per [facility] 

inpatient glycemic control protocol. Thanks.





* Please note that the plan above was derived based on current level of insulin 

resistance and hospital stress. These recommendations are appropriate for 

inpatient admission only. Plan of care upon discharge will need to be 

reassessed to avoid potential outpatient hypo/hyperglycemia.

## 2018-08-06 NOTE — DIAGNOSTIC IMAGING REPORT
CHEST ONE VIEW PORTABLE



CLINICAL HISTORY: Preoperative evaluation. History of tobacco abuse.    



COMPARISON STUDY:  No previous studies for comparison.



FINDINGS: Lung volumes are normal. No pneumothorax or pleural effusion is noted.

Median sternotomy wires are noted as well as mediastinal surgical clips. Mild

cardiomegaly is noted. Note is made of left lower lung opacity overlying the

left heart border. There is no radiographic evidence for pulmonary edema.

Minimal right lower lung opacity is noted. 



IMPRESSION:  



1. Bilateral lower lung opacities, greater on the left. The findings favor

pneumonia.  However, a left lower lung mass could appear similar and

radiographic follow-up to ensure resolution is recommended.



2. Mild to moderate cardiomegaly.







Electronically signed by:  Jake Valles M.D.

8/6/2018 7:16 AM



Dictated Date/Time:  8/6/2018 7:12 AM

## 2018-08-06 NOTE — PROGRESS NOTE
Medicine Progress Note


Date & Time of Visit:


Aug 6, 2018 at 08:26.


Subjective


seen sitting up in bed, comfortable


states she feels fine overall


denies cough , shortness of breath


no chest pain, palpitations, dizziness


no other symptoms





Objective





Last 8 Hrs








  Date Time  Temp Pulse Resp B/P (MAP) Pulse Ox O2 Delivery O2 Flow Rate FiO2


 


8/6/18 07:11 36.5 67 18 165/49 (87) 95 Room Air  








Physical Exam:





General- oriented x 2, not in distress, speaks in sentences with no effort





Head-  atraumatic





Eyes- PERRL, EOMI, anicteric





ENT- oropharynx clear





Neck- supple, no JVD





Lungs- clear to auscultation bilaterally





Heart- regular rhythm; no murmur, normal rate 





Abdomen- normal bowel sounds, soft, nontender





Extremities- right lower leg: boot in place


left: no pretibial edema, no calf tenderness





Neuro- alert, oriented x 2


no gross focal neuro deficits





Skin- warm & dry


Laboratory Results:





Last 24 Hours








Test


  8/5/18


12:10 8/5/18


16:53 8/5/18


20:40 8/6/18


01:59


 


Bedside Glucose 317 mg/dl  247 mg/dl  306 mg/dl  156 mg/dl 


 


Test


  8/6/18


05:09 


  


  


 


 


Bedside Glucose 263 mg/dl    











Assessment & Plan





CELLULITIS RIGHT FOOT / GANGRENE RIGHT GREAT TOE, 5TH TOE


Outpatient cultures grew MRSA and Pseudomonas.


on Dapto + Ceftolozane/Tazobactam


ID consulted





for Amputation today





CAD


No anginal symptoms.


Continue aspirin, metoprolol, amlodipine.


- hold ASA and Pletal today


  resume as soon as possible when hemostasis stable per Ortho





AF


Continue metoprolol 


- hold Eliquis today


  resume as soon as possible when hemostasis stable per Ortho





LOWER LOBE INFILTRATE VS MASS


no respiratory symptoms


check CT chest





HYPERTENSION


Continue metoprolol and amlodipine.





PERIPHERAL VASCULAR DISEASE


Interventional Cardiology consulted





DM TYPE 1


Insulin pump.


Blood sugars typically fluctuate.


Pharmacy consulted.





DYSLIPIDEMIA


Hold statin while receiving daptomycin.





DEMENTIA


Monitor for delirium.





MRSA


Contact precautions.





VTE PROPHYLAXIS


- hold Eliquis today


  resume as soon as possible when hemostasis stable per Ortho





RESUSCITATION STATUS


DNR





DISPOSITION


To be determined.


Family Medicine follow-up with Dr. Mallory.


Current Inpatient Medications:





Current Inpatient Medications








 Medications


  (Trade)  Dose


 Ordered  Sig/Amparo


 Route  Start Time


 Stop Time Status Last Admin


Dose Admin


 


 Acetaminophen


  (Tylenol Tab)  1,000 mg  Q6  PRN


 PO  8/2/18 18:00


 9/1/18 17:59   


 


 


 Apixaban


  (Eliquis)  5 mg  BID


 PO  8/2/18 21:00


 9/1/18 20:59  8/5/18 21:30


5 MG


 


 Aspirin


  (Ecotrin Tab)  81 mg  DAILY


 PO  8/3/18 09:00


 9/2/18 08:59  8/5/18 09:40


81 MG


 


 Cilostazol


  (Pletal Tab)  100 mg  BID


 PO  8/2/18 21:00


 9/1/18 20:59  8/5/18 21:30


100 MG


 


 Citalopram


 Hydrobromide


  (celeXA TAB)  20 mg  DAILY


 PO  8/3/18 09:00


 9/2/18 08:59  8/5/18 09:41


20 MG


 


 Docusate Sodium


  (coLACE CAP)  100 mg  BID


 PO  8/2/18 21:00


 9/1/18 20:59  8/5/18 21:31


100 MG


 


 Donepezil HCl


  (Aricept Tab)  10 mg  DAILY


 PO  8/3/18 09:00


 9/2/18 08:59  8/5/18 09:40


10 MG


 


 Levothyroxine


 Sodium


  (Synthroid Tab)  88 mcg  DAILYBB


 PO  8/3/18 06:00


 9/2/18 06:59  8/5/18 05:44


88 MCG


 


 Memantine


  (Namenda Tab)  10 mg  BID


 PO  8/2/18 21:00


 9/1/18 20:59  8/5/18 21:31


10 MG


 


 Multivitamins


  (Multivitamin


 Tab)  1 tab  DAILY


 PO  8/3/18 09:00


 9/2/18 08:59  8/5/18 09:42


1 TAB


 


 Pantoprazole


 Sodium


  (Protonix Tab)  40 mg  DAILY


 PO  8/3/18 09:00


 9/2/18 08:59  8/5/18 09:41


40 MG


 


 Furosemide


  (Lasix Tab)  40 mg  DAILY


 PO  8/3/18 09:00


 9/2/18 08:59  8/5/18 09:41


40 MG


 


 Metoprolol


 Tartrate


  (Lopressor Tab)  25 mg  BID


 PO  8/2/18 21:00


 9/1/18 20:59  8/5/18 21:30


25 MG


 


 Mirtazapine


  (Remeron Tab)  7.5 mg  HS


 PO  8/2/18 21:00


 9/1/18 20:59  8/5/18 21:30


7.5 MG


 


 Calcium/Vitamin D


  (Caltrate Plus


 Tab)  1 tab  DAILY


 PO  8/3/18 09:00


 9/2/18 08:59  8/5/18 09:40


1 TAB


 


 Polyethylene


  (Miralax Powder


 Packet)  17 gm  DAILY


 PO  8/3/18 09:00


 9/2/18 08:59  8/4/18 09:06


17 GM


 


 Miscellaneous


 Information


  (Consult


 Glycemic


 Management


 Pharmacy)  1 ea  UD  PRN


 N/A  8/2/18 19:42


 9/1/18 19:41   


 


 


 Amlodipine


 Besylate


  (Norvasc Tab)  2.5 mg  DAILY


 PO  8/3/18 09:00


 9/2/18 08:59  8/5/18 09:40


2.5 MG


 


 Daptomycin


  (Consult)  1 ea  UD  PRN


 N/A  8/2/18 20:15


 9/1/18 20:14   


 


 


 Daptomycin 400 mg/


 Syringe  8 ml @ 4


 mls/min  Q24H


 IV  8/2/18 22:00


 9/13/18 21:59  8/5/18 21:32


4 MLS/MIN


 


 Insulin Aspart


  (novoLOG INSULIN


 PUMP)  1 ea  ACHS


 N/A  8/2/18 21:00


 9/1/18 20:59 Future Hold 8/5/18 21:00


1 EA


 


 Insulin Aspart


  (novoLOG ASPART)  SLIDING


 SCALE  PRN  PRN


 SC  8/2/18 20:45


 9/1/18 20:44 Future Hold  


 


 


 Glucose


  (Glucose 40% Gel)  15-30


 GRAMS 15


 GRAMS...  UD  PRN


 PO  8/2/18 20:45


 9/1/18 20:44   


 


 


 Glucose


  (Glucose Chew


 Tab)  4-8


 Tablets 4


 Tabl...  UD  PRN


 PO  8/2/18 20:45


 9/1/18 20:44   


 


 


 Glucagon


  (Glucagon Inj)  1 mg  UD  PRN


 IM  8/2/18 20:45


 9/1/18 20:44   


 


 


 Dextrose


  (Dextrose 50%


 50ML Syringe)  25-50ML


 25ML FOR


 ...  UD  PRN


 IV  8/2/18 20:45


 9/1/18 20:44   


 


 


 Carbohydrates


  (Carbohydrates


 For Hypoglycemia)  15-30 GRAMS


 15 grams if


 BSG 54-69...  UD  PRN


 PO  8/2/18 20:45


 9/1/18 20:44  8/5/18 04:23


15 GM


 


 Enteral


 Nutritional


 Formula


  (Boost Glucose


 Control)  1 can  BIDM


 PO  8/3/18 17:45


 9/2/18 17:44  8/5/18 17:45


1 CAN


 


 Ceftolozane/


 Tazobactam 1.5 gm/


 Dextrose  111.4 ml @ 


 111.4 mls/


 hr  Q8H


 IV  8/3/18 16:00


 8/13/18 15:59  8/5/18 23:43


111.4 MLS/HR


 


 Diphenhydramine


 HCl


  (Benadryl Cap)  25 mg  BID  PRN


 PO  8/3/18 16:30


 9/2/18 16:29   


 


 


 Insulin Aspart


  (novoLOG ASPART)  **SLIDING


 SCALE**


 **G...  Q6


 SC  8/6/18 05:30


 9/5/18 05:29  8/6/18 05:39


3 UNITS

## 2018-08-07 VITALS
OXYGEN SATURATION: 93 % | SYSTOLIC BLOOD PRESSURE: 153 MMHG | TEMPERATURE: 98.78 F | DIASTOLIC BLOOD PRESSURE: 58 MMHG | HEART RATE: 68 BPM

## 2018-08-07 VITALS
OXYGEN SATURATION: 97 % | TEMPERATURE: 97.34 F | DIASTOLIC BLOOD PRESSURE: 55 MMHG | HEART RATE: 64 BPM | SYSTOLIC BLOOD PRESSURE: 172 MMHG

## 2018-08-07 VITALS — OXYGEN SATURATION: 93 %

## 2018-08-07 VITALS
SYSTOLIC BLOOD PRESSURE: 159 MMHG | TEMPERATURE: 98.06 F | OXYGEN SATURATION: 92 % | DIASTOLIC BLOOD PRESSURE: 61 MMHG | HEART RATE: 55 BPM

## 2018-08-07 VITALS
TEMPERATURE: 97.88 F | SYSTOLIC BLOOD PRESSURE: 134 MMHG | OXYGEN SATURATION: 95 % | DIASTOLIC BLOOD PRESSURE: 68 MMHG | HEART RATE: 60 BPM

## 2018-08-07 VITALS
TEMPERATURE: 97.34 F | SYSTOLIC BLOOD PRESSURE: 133 MMHG | OXYGEN SATURATION: 100 % | HEART RATE: 66 BPM | DIASTOLIC BLOOD PRESSURE: 82 MMHG

## 2018-08-07 VITALS
TEMPERATURE: 97.52 F | HEART RATE: 65 BPM | OXYGEN SATURATION: 99 % | DIASTOLIC BLOOD PRESSURE: 55 MMHG | SYSTOLIC BLOOD PRESSURE: 161 MMHG

## 2018-08-07 VITALS
HEART RATE: 63 BPM | DIASTOLIC BLOOD PRESSURE: 50 MMHG | OXYGEN SATURATION: 99 % | SYSTOLIC BLOOD PRESSURE: 127 MMHG | TEMPERATURE: 97.7 F

## 2018-08-07 VITALS
DIASTOLIC BLOOD PRESSURE: 59 MMHG | TEMPERATURE: 97.52 F | HEART RATE: 66 BPM | SYSTOLIC BLOOD PRESSURE: 155 MMHG | OXYGEN SATURATION: 98 %

## 2018-08-07 PROCEDURE — 0HDMXZZ EXTRACTION OF RIGHT FOOT SKIN, EXTERNAL APPROACH: ICD-10-PCS

## 2018-08-07 PROCEDURE — 0Y6P0Z1 DETACHMENT AT RIGHT 1ST TOE, HIGH, OPEN APPROACH: ICD-10-PCS

## 2018-08-07 PROCEDURE — 0HTRXZZ RESECTION OF TOE NAIL, EXTERNAL APPROACH: ICD-10-PCS

## 2018-08-07 PROCEDURE — 0JBQ0ZZ EXCISION OF RIGHT FOOT SUBCUTANEOUS TISSUE AND FASCIA, OPEN APPROACH: ICD-10-PCS

## 2018-08-07 RX ADMIN — METOPROLOL TARTRATE SCH MG: 25 TABLET, FILM COATED ORAL at 08:49

## 2018-08-07 RX ADMIN — DONEPEZIL HYDROCHLORIDE SCH MG: 10 TABLET, FILM COATED ORAL at 08:45

## 2018-08-07 RX ADMIN — INSULIN ASPART SCH UNITS: 100 INJECTION, SOLUTION INTRAVENOUS; SUBCUTANEOUS at 09:05

## 2018-08-07 RX ADMIN — METOPROLOL TARTRATE SCH MG: 25 TABLET, FILM COATED ORAL at 21:07

## 2018-08-07 RX ADMIN — CEFTOLOZANE AND TAZOBACTAM SCH MLS/HR: 1; .5 INJECTION, POWDER, LYOPHILIZED, FOR SOLUTION INTRAVENOUS at 08:13

## 2018-08-07 RX ADMIN — CITALOPRAM HYDROBROMIDE SCH MG: 20 TABLET ORAL at 08:46

## 2018-08-07 RX ADMIN — INSULIN ASPART SCH EA: 100 INJECTION, SOLUTION INTRAVENOUS; SUBCUTANEOUS at 09:09

## 2018-08-07 RX ADMIN — INSULIN ASPART SCH EA: 100 INJECTION, SOLUTION INTRAVENOUS; SUBCUTANEOUS at 21:07

## 2018-08-07 RX ADMIN — PANTOPRAZOLE SCH MG: 40 TABLET, DELAYED RELEASE ORAL at 08:52

## 2018-08-07 RX ADMIN — Medication SCH TAB: at 08:45

## 2018-08-07 RX ADMIN — BACITRACIN ONE APPLN: 500 OINTMENT TOPICAL at 18:30

## 2018-08-07 RX ADMIN — CEFTOLOZANE AND TAZOBACTAM SCH MLS/HR: 1; .5 INJECTION, POWDER, LYOPHILIZED, FOR SOLUTION INTRAVENOUS at 00:35

## 2018-08-07 RX ADMIN — INSULIN ASPART SCH EA: 100 INJECTION, SOLUTION INTRAVENOUS; SUBCUTANEOUS at 12:00

## 2018-08-07 RX ADMIN — Medication SCH TAB: at 08:50

## 2018-08-07 RX ADMIN — MEMANTINE HYDROCHLORIDE SCH MG: 10 TABLET ORAL at 21:28

## 2018-08-07 RX ADMIN — INSULIN ASPART SCH UNITS: 100 INJECTION, SOLUTION INTRAVENOUS; SUBCUTANEOUS at 17:15

## 2018-08-07 RX ADMIN — DOCUSATE SODIUM SCH MG: 100 CAPSULE, LIQUID FILLED ORAL at 08:47

## 2018-08-07 RX ADMIN — BACITRACIN ONE APPLN: 500 OINTMENT TOPICAL at 18:32

## 2018-08-07 RX ADMIN — APIXABAN SCH MG: 5 TABLET, FILM COATED ORAL at 21:07

## 2018-08-07 RX ADMIN — CEFTOLOZANE AND TAZOBACTAM SCH MLS/HR: 1; .5 INJECTION, POWDER, LYOPHILIZED, FOR SOLUTION INTRAVENOUS at 23:48

## 2018-08-07 RX ADMIN — INSULIN ASPART SCH UNITS: 100 INJECTION, SOLUTION INTRAVENOUS; SUBCUTANEOUS at 12:00

## 2018-08-07 RX ADMIN — DAPTOMYCIN SCH MLS/MIN: 50 INJECTION, POWDER, LYOPHILIZED, FOR SOLUTION INTRAVENOUS at 21:36

## 2018-08-07 RX ADMIN — CEFTOLOZANE AND TAZOBACTAM SCH MLS/HR: 1; .5 INJECTION, POWDER, LYOPHILIZED, FOR SOLUTION INTRAVENOUS at 16:06

## 2018-08-07 RX ADMIN — MEMANTINE HYDROCHLORIDE SCH MG: 10 TABLET ORAL at 08:51

## 2018-08-07 RX ADMIN — DOCUSATE SODIUM SCH MG: 100 CAPSULE, LIQUID FILLED ORAL at 21:28

## 2018-08-07 RX ADMIN — INSULIN ASPART SCH UNITS: 100 INJECTION, SOLUTION INTRAVENOUS; SUBCUTANEOUS at 21:52

## 2018-08-07 RX ADMIN — SODIUM CHLORIDE SCH MLS/HR: 900 INJECTION, SOLUTION INTRAVENOUS at 17:00

## 2018-08-07 RX ADMIN — FUROSEMIDE SCH MG: 40 TABLET ORAL at 08:48

## 2018-08-07 RX ADMIN — LEVOTHYROXINE SODIUM SCH MCG: 88 TABLET ORAL at 06:11

## 2018-08-07 RX ADMIN — ACETAMINOPHEN PRN MG: 500 TABLET, FILM COATED ORAL at 21:59

## 2018-08-07 RX ADMIN — MIRTAZAPINE SCH MG: 15 TABLET, FILM COATED ORAL at 21:29

## 2018-08-07 RX ADMIN — AMLODIPINE BESYLATE SCH MG: 5 TABLET ORAL at 08:51

## 2018-08-07 RX ADMIN — INSULIN ASPART SCH EA: 100 INJECTION, SOLUTION INTRAVENOUS; SUBCUTANEOUS at 17:15

## 2018-08-07 RX ADMIN — Medication SCH CAN: at 08:30

## 2018-08-07 NOTE — [facility] POST OPERATIVE BRIEF NOTE
Immediate Operative Summary


Operative Date


Aug 7, 2018.





Pre-Operative Diagnosis





1.  Right great toe dry gangrene.





2.  Dry gangrene of the fifth toe distal tip.





3.  Peripheral vascular disease.





4.  Diabetic polyneuropathy.





5. Diabetic Heel ulcer





Post-Operative Diagnosis





1.  Right great toe dry gangrene.





2.  Dry gangrene of the fifth toe distal tip.





3.  Peripheral vascular disease.





4.  Diabetic polyneuropathy.





5.  Right heel Ulcer





6.  subungal hematoma of right 5th toe





Procedure(s) Performed





1. Debridement of nail bed of right 5th toe; 


2. Partial Amputation of right great toe; 


3. Irrigation and Debridement of right heel ulcer including skin,


subcutaneous fat


& fascia


4. Removal nail plate 5th toe





Surgeon


Dr. MARIA ELENA Moseley





Assistant Surgeon(s)


none





Estimated Blood Loss


20mL





Findings


Consistent with Post-Op Diagnosis





Specimens





A: Right Great Toe





Drains


iodoform 1/2"





Anesthesia Type


MAC [facility]





Complication(s)


none





Disposition


Accompanied Pt To Recover:  no


Disposition:  Recovery Room / PACU

## 2018-08-07 NOTE — PHARMACY PROGRESS NOTE
Pharmacy Glycemic Short Note 2


Date of Service


Aug 7, 2018.





OUTPATIENT ANTIDIABETIC REGIMEN: 


* NovoLog SQ insulin pump


 * Basal Rate = 0.525 units/hr


 * Bolus per parameters:


 * Goal range = 110-150 mg/dl


 * CF = 60 mg/dl/unit


 * CR = 1 unit for every 18g CHO consumed








ASSESSMENT:


* Ms Reyes was supposed to go to the OR yesterday, but procedure was pushed back 

to this evening.


* Insulin pump resumed last night, providing BASAL INSULIN ONLY.  This should 

be continued through procedure today, even while NPO.


* It appears that basal rate on pump is appropriately covering basal needs.  

Will continue to correct with SQ Novolog as needed.





 





PLAN FOR INPATIENT GLYCEMIC CONTROL: 


* Basal insulin: no change 


 * Per SQ insulin pump outpatient settings





* Bolus insulin: nursing to administer SQ NovoLog for meal time coverage since 

patient cannot bolus herself from pump. No changes to current regimen 


 * NovoLog per scale AC or Q6hrs while NPO


 * Goal Range:  Low 100 mg/dL - High 160 mg/dL


 * Correction Factor:  45 mg/dL/unit


 * Nutritional / Prandial insulin per carb ratio of  1 unit per 13 grams CHO 

consumed


      *** NO Novolog coverage at HS, as this tends to make patient hypoglycemic 

in the morning  (BSG should still be checked) ***

## 2018-08-07 NOTE — ANESTHESIOLOGY PROGRESS NOTE
Anesthesia Post Op Note


Date & Time


Aug 7, 2018 at 19:57





Vital Signs


Pain Intensity:  0





Vital Signs Past 12 Hours








  Date Time  Temp Pulse Resp B/P (MAP) Pulse Ox O2 Delivery O2 Flow Rate FiO2


 


8/7/18 19:49 36.4    99 Room Air  


 


8/7/18 19:41    183/64    


 


8/7/18 19:37  61 12     


 


8/7/18 19:37  61 12  99   


 


8/7/18 19:36    202/66    


 


8/7/18 19:32  62 12  96   


 


8/7/18 19:32  62 12     


 


8/7/18 19:31    192/73    


 


8/7/18 19:29  62 18     


 


8/7/18 19:29  62 18  96   


 


8/7/18 19:26    192/69    


 


8/7/18 19:24  61 16     


 


8/7/18 19:24  61 16  98   


 


8/7/18 19:23  62 14  96   


 


8/7/18 19:23  62 14     


 


8/7/18 19:21    192/67    


 


8/7/18 19:18  63 12     


 


8/7/18 19:18  63 12  96   


 


8/7/18 19:16    199/67    


 


8/7/18 19:13  62 11     


 


8/7/18 19:13  62 11  96   


 


8/7/18 19:12    188/91    


 


8/7/18 19:08  63 15     


 


8/7/18 19:08  63 15  97   


 


8/7/18 19:07  61 14     


 


8/7/18 19:07  65 14  97   


 


8/7/18 19:06    212/72    


 


8/7/18 19:02  63 10     


 


8/7/18 19:02  62 10 205/73 98   


 


8/7/18 19:01    209/74    


 


8/7/18 18:57  63 10  99   


 


8/7/18 18:57  63 10     


 


8/7/18 18:56    191/69    


 


8/7/18 18:52 36.2 61 16 184/70 99 Oxymask 10 


 


8/7/18 18:52  61 16 184/70 100   


 


8/7/18 18:52  65 16     


 


8/7/18 15:45      Room Air  


 


8/7/18 15:34 36.6 60 17 134/68 (90) 95 Room Air  


 


8/7/18 11:10 36.7 55 14 159/61 (93) 92 Room Air  


 


8/7/18 09:15     93 Room Air  











Notes


Mental Status:  alert / awake / arousable, participated in evaluation


Pt Amnestic to Procedure:  Yes


Nausea / Vomiting:  adequately controlled


Pain:  adequately controlled


Airway Patency, RR, SpO2:  stable & adequate


BP & HR:  stable & adequate


Hydration State:  stable & adequate


Anesthetic Complications:  no major complications apparent


Anesthetic Complications:


Patient was hypertensive in PACU.  She denied pain and denied the need to 

urinate.  Blood pressure improved after treatment with labetalol and 

hydralazine.  Pt ok to discharge back to the floor.

## 2018-08-07 NOTE — DIAGNOSTIC IMAGING REPORT
R FOOT 2 VIEWS



CLINICAL HISTORY: post-op   



COMPARISON STUDY:  Right foot 8/2/2018.



FINDINGS: Interval resection of the majority of the right first toe. Only the

base of the proximal phalanx of the right first toe remains. The bones are

osteopenic. Vascular calcifications are noted. No fracture or dislocation.



IMPRESSION:  Interval resection of the majority of the right first toe. 





 







Electronically signed by:  Ras Ritchie M.D.

8/7/2018 8:24 PM



Dictated Date/Time:  8/7/2018 8:23 PM

## 2018-08-07 NOTE — OPERATIVE REPORT
DATE OF OPERATION:  08/07/2018

 

PREOPERATIVE DIAGNOSES:

1.  Right great toe gangrene.

2.  Right fifth toe subungual hematoma.

3.  Dystrophic nail fifth toe.

4.  Diabetic neuropathic ulcer with positive culture from MRSA.

 

POSTOPERATIVE DIAGNOSES:

1.  Right great toe gangrene.

2.  Right fifth toe subungual hematoma.

3.  Dystrophic nail fifth toe.

4.  Diabetic neuropathic ulcer with positive culture from MRSA.

 

PROCEDURE:

1.  Right great toe partial amputation to the level of the mid proximal

phalanx.

2.  Removal of fifth toe nail plate.

3.  Irrigation and debridement of the fifth toe nail bed.

4.  Irrigation and debridement of right heel including skin, subcutaneous

fat, and fascia.

 

SURGEON:  Farhat Moseley DO

 

FIRST ASSISTANT:  None.

 

ANESTHESIA:  Monitored anesthesia care with regional block.

 

SPECIMENS:  Distal great toe.

 

DRAINS:  One-half-inch iodoform gauze x1.

 

COMPLICATIONS:  None.

 

BLOOD LOSS:  20 mL.

 

PERTINENT HISTORY:  This is a 73-year-old female who has had vascular and

diabetic neuropathic issues for the last several years.  She is a

longstanding type 1 insulin-requiring diabetic.  She is being cared for at

the wound clinic and had a culture taken of her heel ulcers, which was noted

to be positive for MRSA.  She was then referred to [facility] for IV antibiotic treatment.  Subsequent to that also noted that her

great toe on the right foot had turned black and become gangrenous.  In

addition, there is eschar and changes consistent with early gangrene at the

distal tip of the right fifth toe.  The patient was then scheduled for

surgery as indicated.

 

All potential risks, benefits, complications, alternatives, rehab, potential

for incomplete relief of symptoms, need for further surgery, DVT, PE, death,

pain, swelling, scarring, weakness, neurovascular injury, wound complications

were discussed with the patient.  Patient decided to proceed with procedure

as indicated.

 

DESCRIPTION OF PROCEDURE:  The patient was taken to the preoperative suite

and a popliteal block was administered.  The patient was then taken to

operative suite and placed supine on the operating room table.  After review

of the consent and identification of proper operative site, the patient was

sedated and monitored anesthesia care was provided.  The right foot was then

sterilely prepped and draped in usual fashion, elevated, and partially

exsanguinated with an Esmarch bandage and Esmarch tourniquet was applied over

sterile surgical towel at the level of the ankle.  Next, the 15 blade scalpel

was used to make a circumferential incision around the great toe following

the margins of demarcation of necrosis at the level of gangrene versus

healthy tissue.  In this case, a less than ideal dorsal flap was left as

opposed to a plantar flap which is typically more vascular.  The decision was

made that due to the nature of the gangrene and a complete and total skin

loss on the plantar aspect of the right great toe, a dorsal flap would be

more appropriate.  Next, the necrotic portion of the toe was then 

and passed off as specimen roughly at the level of the interphalangeal joint.

 Next, the proximal phalanx was then skeletonized just distal to the

metaphyseal flare using a deep dissection with a 15 blade scalpel and a Mcintosh

elevator.  Next, appropriate skin retraction was applied and then a

transverse resection of the distal two-thirds of the proximal phalanx was

then performed with a sagittal saw.  Healthy bone was left at the margin. 

This was smoothed and contoured with a rongeur and irrigated copiously with

sterile normal saline until clear.  Next, the fifth toe was examined and the

hypertrophic nail plate was then removed using a hemostat with careful

dissection at the level of the germinal matrix.  The plate was removed and

passed off.  Next, a small curette was then used to curettage the surrounding

tissue at the distal tip of the toe.  The subungual hematoma was then

debrided and irrigated to clear.  This left a healthy appearing germinal

matrix.  There is no wound necrosis at the distal fifth toe.  Therefore, the

toe was then salvaged.  Next, the wound at the 5th toe and the first toe were

copiously irrigated with sterile normal saline until clear.  Electrocautery

was used to cauterize any gross bleeding vessels of the great toe and then

the flap closure was performed with interrupted 3-0 nylon sutures with a

combination of simple and horizontal mattress stitches with no tension

technique.  Next, 1/2 inch iodoform gauze was tucked under the flap of tissue

to provide egress for fluid.  Next, the attention was then directed toward

the heel.  Curette was then used to curettage the fibrinous exudate until

clear.  A 15 blade scalpel was used to excise the hypertrophic rind of tissue

around the circumference of the ulcer until fresh tissue was encountered. 

Slight bleeding was noted.  Curettage was then performed of the skin and

subcutaneous fat and the small patches of fascia beneath.  The remainder of

the tissue appeared healthy.  Slight bleeding was irrigated until clear and

then a sterile compressive dressing was applied to the great toe, the fifth

toe, and the heel consisting of Xeroform gauze, sterile 4 x 4's, ABD pad, and

cast padding.  This was covered by a 4-inch Ace wrap, the tourniquet was

released, the patient was awakened and taken to recovery in stable condition.

 

 

I attest to the content of the Intraoperative Record and any orders documented therein. Any exception
s are noted below.

## 2018-08-08 VITALS
TEMPERATURE: 97.34 F | DIASTOLIC BLOOD PRESSURE: 57 MMHG | SYSTOLIC BLOOD PRESSURE: 156 MMHG | HEART RATE: 60 BPM | OXYGEN SATURATION: 96 %

## 2018-08-08 VITALS — OXYGEN SATURATION: 98 %

## 2018-08-08 VITALS — DIASTOLIC BLOOD PRESSURE: 55 MMHG | SYSTOLIC BLOOD PRESSURE: 153 MMHG

## 2018-08-08 VITALS
TEMPERATURE: 97.52 F | DIASTOLIC BLOOD PRESSURE: 61 MMHG | HEART RATE: 57 BPM | SYSTOLIC BLOOD PRESSURE: 138 MMHG | OXYGEN SATURATION: 98 %

## 2018-08-08 VITALS
TEMPERATURE: 97.88 F | SYSTOLIC BLOOD PRESSURE: 172 MMHG | DIASTOLIC BLOOD PRESSURE: 55 MMHG | OXYGEN SATURATION: 96 % | HEART RATE: 66 BPM

## 2018-08-08 VITALS
HEART RATE: 63 BPM | TEMPERATURE: 97.52 F | OXYGEN SATURATION: 94 % | DIASTOLIC BLOOD PRESSURE: 56 MMHG | SYSTOLIC BLOOD PRESSURE: 116 MMHG

## 2018-08-08 VITALS — OXYGEN SATURATION: 95 %

## 2018-08-08 LAB — CREAT SERPL-MCNC: 0.92 MG/DL (ref 0.6–1.2)

## 2018-08-08 RX ADMIN — METOPROLOL TARTRATE SCH MG: 25 TABLET, FILM COATED ORAL at 08:10

## 2018-08-08 RX ADMIN — METOPROLOL TARTRATE SCH MG: 25 TABLET, FILM COATED ORAL at 21:52

## 2018-08-08 RX ADMIN — INSULIN ASPART SCH EA: 100 INJECTION, SOLUTION INTRAVENOUS; SUBCUTANEOUS at 08:26

## 2018-08-08 RX ADMIN — Medication SCH GM: at 08:46

## 2018-08-08 RX ADMIN — INSULIN ASPART SCH UNITS: 100 INJECTION, SOLUTION INTRAVENOUS; SUBCUTANEOUS at 09:23

## 2018-08-08 RX ADMIN — INSULIN ASPART SCH EA: 100 INJECTION, SOLUTION INTRAVENOUS; SUBCUTANEOUS at 17:15

## 2018-08-08 RX ADMIN — INSULIN ASPART SCH EA: 100 INJECTION, SOLUTION INTRAVENOUS; SUBCUTANEOUS at 21:00

## 2018-08-08 RX ADMIN — ACETAMINOPHEN PRN MG: 500 TABLET, FILM COATED ORAL at 22:03

## 2018-08-08 RX ADMIN — MORPHINE SULFATE PRN MG: 4 INJECTION, SOLUTION INTRAMUSCULAR; INTRAVENOUS at 23:43

## 2018-08-08 RX ADMIN — DOCUSATE SODIUM SCH MG: 100 CAPSULE, LIQUID FILLED ORAL at 08:47

## 2018-08-08 RX ADMIN — CEFTOLOZANE AND TAZOBACTAM SCH MLS/HR: 1; .5 INJECTION, POWDER, LYOPHILIZED, FOR SOLUTION INTRAVENOUS at 23:46

## 2018-08-08 RX ADMIN — APIXABAN SCH MG: 5 TABLET, FILM COATED ORAL at 21:54

## 2018-08-08 RX ADMIN — CEFTOLOZANE AND TAZOBACTAM SCH MLS/HR: 1; .5 INJECTION, POWDER, LYOPHILIZED, FOR SOLUTION INTRAVENOUS at 08:18

## 2018-08-08 RX ADMIN — MEMANTINE HYDROCHLORIDE SCH MG: 10 TABLET ORAL at 21:52

## 2018-08-08 RX ADMIN — INSULIN ASPART SCH UNITS: 100 INJECTION, SOLUTION INTRAVENOUS; SUBCUTANEOUS at 18:40

## 2018-08-08 RX ADMIN — DAPTOMYCIN SCH MLS/MIN: 50 INJECTION, POWDER, LYOPHILIZED, FOR SOLUTION INTRAVENOUS at 21:57

## 2018-08-08 RX ADMIN — INSULIN ASPART SCH EA: 100 INJECTION, SOLUTION INTRAVENOUS; SUBCUTANEOUS at 13:00

## 2018-08-08 RX ADMIN — MORPHINE SULFATE PRN MG: 4 INJECTION, SOLUTION INTRAMUSCULAR; INTRAVENOUS at 00:29

## 2018-08-08 RX ADMIN — CEFTOLOZANE AND TAZOBACTAM SCH MLS/HR: 1; .5 INJECTION, POWDER, LYOPHILIZED, FOR SOLUTION INTRAVENOUS at 16:31

## 2018-08-08 RX ADMIN — INSULIN ASPART SCH UNITS: 100 INJECTION, SOLUTION INTRAVENOUS; SUBCUTANEOUS at 12:59

## 2018-08-08 RX ADMIN — PANTOPRAZOLE SCH MG: 40 TABLET, DELAYED RELEASE ORAL at 08:43

## 2018-08-08 RX ADMIN — DONEPEZIL HYDROCHLORIDE SCH MG: 10 TABLET, FILM COATED ORAL at 08:49

## 2018-08-08 RX ADMIN — MEMANTINE HYDROCHLORIDE SCH MG: 10 TABLET ORAL at 08:45

## 2018-08-08 RX ADMIN — AMLODIPINE BESYLATE SCH MG: 5 TABLET ORAL at 08:11

## 2018-08-08 RX ADMIN — Medication SCH TAB: at 08:48

## 2018-08-08 RX ADMIN — CILOSTAZOL SCH MG: 100 TABLET ORAL at 21:52

## 2018-08-08 RX ADMIN — DOCUSATE SODIUM SCH MG: 100 CAPSULE, LIQUID FILLED ORAL at 21:52

## 2018-08-08 RX ADMIN — MIRTAZAPINE SCH MG: 15 TABLET, FILM COATED ORAL at 21:52

## 2018-08-08 RX ADMIN — Medication SCH CAN: at 08:42

## 2018-08-08 RX ADMIN — LEVOTHYROXINE SODIUM SCH MCG: 88 TABLET ORAL at 05:18

## 2018-08-08 RX ADMIN — Medication SCH TAB: at 08:45

## 2018-08-08 RX ADMIN — SODIUM CHLORIDE SCH MLS/HR: 900 INJECTION, SOLUTION INTRAVENOUS at 16:32

## 2018-08-08 RX ADMIN — CITALOPRAM HYDROBROMIDE SCH MG: 20 TABLET ORAL at 08:48

## 2018-08-08 NOTE — ANESTHESIOLOGY PROGRESS NOTE
Anesthesia Post Op Note


Date & Time


Aug 8, 2018 at 08:04





Vital Signs





Vital Signs Past 12 Hours








  Date Time  Temp Pulse Resp B/P (MAP) Pulse Ox O2 Delivery O2 Flow Rate FiO2


 


8/8/18 07:45 36.6 66 17 172/55 (94) 96 Room Air  


 


8/8/18 03:31 36.4 63 16 116/56 (76) 94 Room Air  


 


8/7/18 23:15 36.5 63 16 127/50 (75) 99 Room Air  


 


8/7/18 23:15      Room Air  


 


8/7/18 22:08 36.3 66 17 133/82 (99) 100 Room Air  


 


8/7/18 21:11 36.4 66 16 155/59 (91) 98 Room Air  


 


8/7/18 21:01 36.4 65 16 161/55 (90) 99 Room Air  


 


8/7/18 20:30      Room Air  


 


8/7/18 20:26 36.3 64 17 172/55 (94) 97 Room Air  











Notes


Mental Status:  alert / awake / arousable, participated in evaluation


Pt Amnestic to Procedure:  Yes


Nausea / Vomiting:  adequately controlled


Pain:  adequately controlled


Airway Patency, RR, SpO2:  stable & adequate


BP & HR:  stable & adequate


Hydration State:  stable & adequate


Anesthetic Complications:  no major complications apparent

## 2018-08-08 NOTE — PROGRESS NOTE
Medicine Progress Note


Date & Time of Visit:


Aug 8, 2018 at 17:29.


Subjective


Sitting up in bedside chair, comfortable


States she feels fine overall


Denies pain in the surgical site


No chest pain, shortness of breath, palpitations, dizziness


No other symptoms





Objective





Last 8 Hrs








  Date Time  Temp Pulse Resp B/P (MAP) Pulse Ox O2 Delivery O2 Flow Rate FiO2


 


8/8/18 15:30 36.4 57 16 138/61 (86) 98 Room Air  


 


8/8/18 11:32 36.3 60 17 156/57 (90) 96 Room Air  


 


8/8/18 10:00      Room Air  


 


8/8/18 09:38     95   








Physical Exam:





General- oriented x 2, not in distress, speaks in sentences with no effort





Eyes- anicteric





Neck- no JVD





Lungs- clear breath sounds bilaterally





Heart- regular rhythm; no murmur, normal rate 





Abdomen- normal bowel sounds, soft, nontender





Extremities- right lower leg: dressing in place


                 left: no pretibial edema, no calf tenderness





Neuro- alert, oriented x 2


          no gross focal neuro deficits





Skin- warm & dry


Laboratory Results:





Last 24 Hours








Test


  8/7/18


18:52 8/7/18


20:33 8/8/18


06:08 8/8/18


08:21


 


Bedside Glucose 120 mg/dl  114 mg/dl   211 mg/dl 


 


Creatinine   0.92 mg/dl  


 


Est Creatinine Clear Calc


Drug Dose 


  


  50.0 ml/min 


  


 


 


Estimated GFR (


American) 


  


  70.6 


  


 


 


Estimated GFR (Non-


American 


  


  60.9 


  


 


 


Test


  8/8/18


12:16 8/8/18


17:13 


  


 


 


Bedside Glucose 190 mg/dl  221 mg/dl   











Assessment & Plan





CELLULITIS RIGHT FOOT / GANGRENE RIGHT GREAT TOE, 5TH TOE


8/7/18


1.  Right great toe partial amputation to the level of the mid proximal


phalanx.


2.  Removal of fifth toe nail plate.


3.  Irrigation and debridement of the fifth toe nail bed.


4.  Irrigation and debridement of right heel including skin, subcutaneous


fat, and fascia.





Per Ortho:


PT/ OT  heel weight bearing only with post op shoe


Dressing change tomorrow


DVT proph- ASA, Eliquis





Outpatient cultures grew MRSA and Pseudomonas.


on Dapto + Ceftolozane/Tazobactam


ID consulted





stable after surgery


PT/OT


may need Rehab





CAD


No anginal symptoms.


Continue aspirin, metoprolol, amlodipine.


-may resume ASA per Ortho


  hold Pletal, will confirm with Ortho


- hold Lasix to avoid dehydration





AF


Continue metoprolol 


- will resumed Eliquis this evening, ok with Ortho


  


PULMONARY NODULES, RIGHT MIDDLE LOBE INFILTRATE


no respiratory symptoms


per patient's daughter Rupinder, patient is following with [facility] in [facility] for this 


will obtain previous records, last CT chest films


Pulmonary consulted, recommend outpatient follow up- discussed with Dr. Bragg





HYPERTENSION


Continue metoprolol and amlodipine.





PERIPHERAL VASCULAR DISEASE


Interventional Cardiology consulted





DM TYPE 1


Insulin pump.


Blood sugars typically fluctuate.


Pharmacy consulted.





DYSLIPIDEMIA


Hold statin while receiving daptomycin.





DEMENTIA


Monitor for delirium.





MRSA


Contact precautions.





VTE PROPHYLAXIS


- resume Eliquis


 


RESUSCITATION STATUS


DNR





DISPOSITION


anticipate transition to Rehab/SNF after toe amputation


Family Medicine follow-up with Dr. Mallory.


Current Inpatient Medications:





Current Inpatient Medications








 Medications


  (Trade)  Dose


 Ordered  Sig/Amparo


 Route  Start Time


 Stop Time Status Last Admin


Dose Admin


 


 Acetaminophen


  (Tylenol Tab)  1,000 mg  Q6  PRN


 PO  8/2/18 18:00


 9/1/18 17:59  8/7/18 21:59


1,000 MG


 


 Apixaban


  (Eliquis)  5 mg  BID


 PO  8/2/18 21:00


 9/1/18 20:59 Future hold 8/7/18 21:07


5 MG


 


 Aspirin


  (Ecotrin Tab)  81 mg  DAILY


 PO  8/3/18 09:00


 9/2/18 08:59 Future hold 8/5/18 09:40


81 MG


 


 Cilostazol


  (Pletal Tab)  100 mg  BID


 PO  8/2/18 21:00


 9/1/18 20:59 Future hold 8/6/18 20:30


100 MG


 


 Citalopram


 Hydrobromide


  (celeXA TAB)  20 mg  DAILY


 PO  8/3/18 09:00


 9/2/18 08:59  8/8/18 08:48


20 MG


 


 Docusate Sodium


  (coLACE CAP)  100 mg  BID


 PO  8/2/18 21:00


 9/1/18 20:59  8/8/18 08:47


100 MG


 


 Donepezil HCl


  (Aricept Tab)  10 mg  DAILY


 PO  8/3/18 09:00


 9/2/18 08:59  8/8/18 08:49


10 MG


 


 Levothyroxine


 Sodium


  (Synthroid Tab)  88 mcg  DAILYBB


 PO  8/3/18 06:00


 9/2/18 06:59  8/8/18 05:18


88 MCG


 


 Memantine


  (Namenda Tab)  10 mg  BID


 PO  8/2/18 21:00


 9/1/18 20:59  8/8/18 08:45


10 MG


 


 Multivitamins


  (Multivitamin


 Tab)  1 tab  DAILY


 PO  8/3/18 09:00


 9/2/18 08:59  8/8/18 08:45


1 TAB


 


 Pantoprazole


 Sodium


  (Protonix Tab)  40 mg  DAILY


 PO  8/3/18 09:00


 9/2/18 08:59  8/8/18 08:43


40 MG


 


 Metoprolol


 Tartrate


  (Lopressor Tab)  25 mg  BID


 PO  8/2/18 21:00


 9/1/18 20:59  8/8/18 08:10


25 MG


 


 Mirtazapine


  (Remeron Tab)  7.5 mg  HS


 PO  8/2/18 21:00


 9/1/18 20:59  8/7/18 21:29


7.5 MG


 


 Calcium/Vitamin D


  (Caltrate Plus


 Tab)  1 tab  DAILY


 PO  8/3/18 09:00


 9/2/18 08:59  8/8/18 08:48


1 TAB


 


 Polyethylene


  (Miralax Powder


 Packet)  17 gm  DAILY


 PO  8/3/18 09:00


 9/2/18 08:59 Future hold 8/8/18 08:46


17 GM


 


 Miscellaneous


 Information


  (Consult


 Glycemic


 Management


 Pharmacy)  1 ea  UD  PRN


 N/A  8/2/18 19:42


 9/1/18 19:41   


 


 


 Amlodipine


 Besylate


  (Norvasc Tab)  2.5 mg  DAILY


 PO  8/3/18 09:00


 9/2/18 08:59  8/8/18 08:11


2.5 MG


 


 Daptomycin


  (Consult)  1 ea  UD  PRN


 N/A  8/2/18 20:15


 9/1/18 20:14   


 


 


 Daptomycin 400 mg/


 Syringe  8 ml @ 4


 mls/min  Q24H


 IV  8/2/18 22:00


 9/13/18 21:59  8/7/18 21:36


4 MLS/MIN


 


 Insulin Aspart


  (novoLOG INSULIN


 PUMP)  1 ea  ACHS


 N/A  8/2/18 21:00


 9/1/18 20:59 Future hold 8/8/18 13:00


1 EA


 


 Insulin Aspart


  (novoLOG ASPART)  SLIDING


 SCALE  PRN  PRN


 SC  8/2/18 20:45


 9/1/18 20:44 Future hold  


 


 


 Glucose


  (Glucose 40% Gel)  15-30


 GRAMS 15


 GRAMS...  UD  PRN


 PO  8/2/18 20:45


 9/1/18 20:44   


 


 


 Glucose


  (Glucose Chew


 Tab)  4-8


 Tablets 4


 Tabl...  UD  PRN


 PO  8/2/18 20:45


 9/1/18 20:44   


 


 


 Glucagon


  (Glucagon Inj)  1 mg  UD  PRN


 IM  8/2/18 20:45


 9/1/18 20:44   


 


 


 Dextrose


  (Dextrose 50%


 50ML Syringe)  25-50ML


 25ML FOR


 ...  UD  PRN


 IV  8/2/18 20:45


 9/1/18 20:44   


 


 


 Carbohydrates


  (Carbohydrates


 For Hypoglycemia)  15-30 GRAMS


 15 grams if


 BSG 54-69...  UD  PRN


 PO  8/2/18 20:45


 9/1/18 20:44  8/5/18 04:23


15 GM


 


 Ceftolozane/


 Tazobactam 1.5 gm/


 Dextrose  111.4 ml @ 


 111.4 mls/


 hr  Q8H


 IV  8/3/18 16:00


 8/13/18 15:59  8/8/18 16:31


111.4 MLS/HR


 


 Diphenhydramine


 HCl


  (Benadryl Cap)  25 mg  BID  PRN


 PO  8/3/18 16:30


 9/2/18 16:29   


 


 


 Insulin Aspart


  (novoLOG ASPART)  **SLIDING


 SCALE**


 **G...  AC


 SC  8/6/18 18:00


 9/5/18 17:59  8/8/18 12:59


3 UNITS


 


 Sodium Chloride  1,000 ml @ 


 15 mls/hr  Q24H


 IV  8/7/18 17:00


 9/6/18 16:59   


 


 


 Morphine Sulfate


  (MoRPHine


 SULFATE INJ)  3 mg  Q3HWA  PRN


 IV  8/8/18 00:00


 8/22/18 00:00  8/8/18 00:29


3 MG


 


 Tramadol HCl


  (Ultram Tab)  50 mg  Q6H  PRN


 PO  8/8/18 08:00


 9/7/18 07:59

## 2018-08-09 VITALS — DIASTOLIC BLOOD PRESSURE: 61 MMHG | SYSTOLIC BLOOD PRESSURE: 161 MMHG | HEART RATE: 68 BPM

## 2018-08-09 VITALS
TEMPERATURE: 98.6 F | SYSTOLIC BLOOD PRESSURE: 148 MMHG | OXYGEN SATURATION: 93 % | DIASTOLIC BLOOD PRESSURE: 54 MMHG | HEART RATE: 60 BPM

## 2018-08-09 VITALS
SYSTOLIC BLOOD PRESSURE: 166 MMHG | OXYGEN SATURATION: 90 % | HEART RATE: 67 BPM | DIASTOLIC BLOOD PRESSURE: 59 MMHG | TEMPERATURE: 98.78 F

## 2018-08-09 VITALS
OXYGEN SATURATION: 95 % | SYSTOLIC BLOOD PRESSURE: 123 MMHG | DIASTOLIC BLOOD PRESSURE: 76 MMHG | TEMPERATURE: 97.34 F | HEART RATE: 75 BPM

## 2018-08-09 VITALS
DIASTOLIC BLOOD PRESSURE: 58 MMHG | TEMPERATURE: 98.42 F | HEART RATE: 68 BPM | SYSTOLIC BLOOD PRESSURE: 161 MMHG | OXYGEN SATURATION: 92 %

## 2018-08-09 VITALS — OXYGEN SATURATION: 92 %

## 2018-08-09 VITALS — OXYGEN SATURATION: 93 %

## 2018-08-09 LAB
BASOPHILS # BLD: 0.02 K/UL (ref 0–0.2)
BASOPHILS NFR BLD: 0.3 %
EOS ABS #: 0.2 K/UL (ref 0–0.5)
EOSINOPHIL NFR BLD AUTO: 322 K/UL (ref 130–400)
HCT VFR BLD CALC: 31.2 % (ref 37–47)
HGB BLD-MCNC: 9.9 G/DL (ref 12–16)
IG#: 0.01 K/UL (ref 0–0.02)
IMM GRANULOCYTES NFR BLD AUTO: 25.7 %
LYMPHOCYTES # BLD: 1.81 K/UL (ref 1.2–3.4)
MCH RBC QN AUTO: 29 PG (ref 25–34)
MCHC RBC AUTO-ENTMCNC: 31.7 G/DL (ref 32–36)
MCV RBC AUTO: 91.5 FL (ref 80–100)
MONO ABS #: 0.6 K/UL (ref 0.11–0.59)
MONOCYTES NFR BLD: 8.5 %
NEUT ABS #: 4.41 K/UL (ref 1.4–6.5)
NEUTROPHILS # BLD AUTO: 2.8 %
NEUTROPHILS NFR BLD AUTO: 62.6 %
PMV BLD AUTO: 9.6 FL (ref 7.4–10.4)
RED CELL DISTRIBUTION WIDTH CV: 17.7 % (ref 11.5–14.5)
RED CELL DISTRIBUTION WIDTH SD: 59.6 FL (ref 36.4–46.3)
WBC # BLD AUTO: 7.05 K/UL (ref 4.8–10.8)

## 2018-08-09 RX ADMIN — DONEPEZIL HYDROCHLORIDE SCH MG: 10 TABLET, FILM COATED ORAL at 10:01

## 2018-08-09 RX ADMIN — CILOSTAZOL SCH MG: 100 TABLET ORAL at 08:18

## 2018-08-09 RX ADMIN — AMLODIPINE BESYLATE SCH MG: 5 TABLET ORAL at 10:00

## 2018-08-09 RX ADMIN — CITALOPRAM HYDROBROMIDE SCH MG: 20 TABLET ORAL at 10:01

## 2018-08-09 RX ADMIN — Medication SCH GM: at 08:25

## 2018-08-09 RX ADMIN — INSULIN ASPART SCH EA: 100 INJECTION, SOLUTION INTRAVENOUS; SUBCUTANEOUS at 21:27

## 2018-08-09 RX ADMIN — LEVOTHYROXINE SODIUM SCH MCG: 88 TABLET ORAL at 05:44

## 2018-08-09 RX ADMIN — CILOSTAZOL SCH MG: 100 TABLET ORAL at 21:28

## 2018-08-09 RX ADMIN — PANTOPRAZOLE SCH MG: 40 TABLET, DELAYED RELEASE ORAL at 10:00

## 2018-08-09 RX ADMIN — Medication SCH TAB: at 10:02

## 2018-08-09 RX ADMIN — CEFTOLOZANE AND TAZOBACTAM SCH MLS/HR: 1; .5 INJECTION, POWDER, LYOPHILIZED, FOR SOLUTION INTRAVENOUS at 07:57

## 2018-08-09 RX ADMIN — INSULIN ASPART SCH EA: 100 INJECTION, SOLUTION INTRAVENOUS; SUBCUTANEOUS at 07:57

## 2018-08-09 RX ADMIN — FUROSEMIDE SCH MG: 40 TABLET ORAL at 10:20

## 2018-08-09 RX ADMIN — INSULIN ASPART SCH EA: 100 INJECTION, SOLUTION INTRAVENOUS; SUBCUTANEOUS at 12:00

## 2018-08-09 RX ADMIN — Medication SCH TAB: at 10:01

## 2018-08-09 RX ADMIN — CEFTOLOZANE AND TAZOBACTAM SCH MLS/HR: 1; .5 INJECTION, POWDER, LYOPHILIZED, FOR SOLUTION INTRAVENOUS at 15:39

## 2018-08-09 RX ADMIN — METOPROLOL TARTRATE SCH MG: 25 TABLET, FILM COATED ORAL at 10:02

## 2018-08-09 RX ADMIN — MIRTAZAPINE SCH MG: 15 TABLET, FILM COATED ORAL at 21:29

## 2018-08-09 RX ADMIN — INSULIN ASPART SCH EA: 100 INJECTION, SOLUTION INTRAVENOUS; SUBCUTANEOUS at 17:15

## 2018-08-09 RX ADMIN — DAPTOMYCIN SCH MLS/MIN: 50 INJECTION, POWDER, LYOPHILIZED, FOR SOLUTION INTRAVENOUS at 21:30

## 2018-08-09 RX ADMIN — MEMANTINE HYDROCHLORIDE SCH MG: 10 TABLET ORAL at 21:29

## 2018-08-09 RX ADMIN — INSULIN ASPART SCH UNITS: 100 INJECTION, SOLUTION INTRAVENOUS; SUBCUTANEOUS at 13:10

## 2018-08-09 RX ADMIN — APIXABAN SCH MG: 5 TABLET, FILM COATED ORAL at 21:29

## 2018-08-09 RX ADMIN — Medication SCH MG: at 10:01

## 2018-08-09 RX ADMIN — DOCUSATE SODIUM SCH MG: 100 CAPSULE, LIQUID FILLED ORAL at 21:28

## 2018-08-09 RX ADMIN — INSULIN ASPART SCH UNITS: 100 INJECTION, SOLUTION INTRAVENOUS; SUBCUTANEOUS at 08:24

## 2018-08-09 RX ADMIN — DOCUSATE SODIUM SCH MG: 100 CAPSULE, LIQUID FILLED ORAL at 08:19

## 2018-08-09 RX ADMIN — INSULIN ASPART SCH UNITS: 100 INJECTION, SOLUTION INTRAVENOUS; SUBCUTANEOUS at 18:23

## 2018-08-09 RX ADMIN — APIXABAN SCH MG: 5 TABLET, FILM COATED ORAL at 08:18

## 2018-08-09 RX ADMIN — MEMANTINE HYDROCHLORIDE SCH MG: 10 TABLET ORAL at 08:18

## 2018-08-09 RX ADMIN — SODIUM CHLORIDE SCH MLS/HR: 900 INJECTION, SOLUTION INTRAVENOUS at 17:00

## 2018-08-09 RX ADMIN — METOPROLOL TARTRATE SCH MG: 25 TABLET, FILM COATED ORAL at 21:29

## 2018-08-09 NOTE — CONSULTANT RECOMMENDATIONS
Consultant Recommendations


Date of Service


Aug 9, 2018.





Consultant Recommendations


Daily dressing changes to the right foot.  Keep an eye on her heel as well. (

breakdown?)


Weightbearing on the heel only with protective shoe


Ok to shower if you keep the dressing covered with a waterproof covering


Call if you have increased temp of 101.5 or greater, increased redness, 

increased swelling or drainage.  [phone]








Follow up with Dr Moseley in 14 days from the day of surgery.  Call for an 

appointment.  [phone]

## 2018-08-09 NOTE — PHARMACY PROGRESS NOTE
Pharmacy Glycemic Short Note 2


Date of Service


Aug 9, 2018.





OUTPATIENT ANTIDIABETIC REGIMEN: 


* NovoLog SQ insulin pump


 * Basal Rate = 0.525 units/hr


 * Bolus per parameters:


 * Goal range = 110-150 mg/dl


 * CF = 60 mg/dl/unit


 * CR = 1 unit for every 18g CHO consumed








ASSESSMENT:


* Ms Reyes is POD #2 s/p toe amputation.


* Patient has been utilizing her insulin pump to provide basal insulin only.  

BSGs have fluctuated (which is normal for her) but have been relatively stable 

the past few days.


* Novolog correction factor tightened very slightly this morning to provide 

slightly better coverage when pt is hyperglycemic.


* Patient continues to do best with NO BEDTIME NOVOLOG COVERAGE (even if pt has 

HS snack).  Will continue this.





 





PLAN FOR INPATIENT GLYCEMIC CONTROL: 


* Basal insulin: no change 


 * Per SQ insulin pump outpatient settings





* Bolus insulin: nursing to administer SQ NovoLog for meal time coverage since 

patient cannot bolus herself from pump. No changes to current regimen 


 * NovoLog per scale AC or Q6hrs while NPO


 * Goal Range:  Low 100 mg/dL - High 160 mg/dL


 * Correction Factor:  40 mg/dL/unit


 * Nutritional / Prandial insulin per carb ratio of  1 unit per 13 grams CHO 

consumed


      *** NO Novolog coverage at HS, as this tends to make patient hypoglycemic 

in the morning  (BSG should still be checked) ***

## 2018-08-09 NOTE — PROGRESS NOTE
Medicine Progress Note


Date & Time of Visit:


Aug 9, 2018 at 08:49.


Subjective


seen resting in bed, comfortable 


states she feels good 


denies chest pain, dyspnea, dizziness, nausea


no pain on the right foot


no bleeding


denies other symptoms





Objective





Last 8 Hrs








  Date Time  Temp Pulse Resp B/P (MAP) Pulse Ox O2 Delivery O2 Flow Rate FiO2


 


8/9/18 08:11      Room Air  


 


8/9/18 07:38 36.9 68 17 161/58 (92) 92 Room Air  








Physical Exam:





General- oriented x 2, not in distress, speaks in sentences with no effort





Eyes- anicteric





Neck- no JVD





Lungs- clear BS bilaterally


          no rales/wheezes





Heart- regular rhythm; no murmur, normal rate 





Abdomen- normal bowel sounds, soft, nontender





Extremities- right lower leg: dressing in place


                 left: no pretibial edema, no calf tenderness





Neuro- alert, oriented x 2


          no gross focal neuro deficits





Skin- warm & dry


Laboratory Results:





Last 24 Hours








Test


  8/8/18


12:16 8/8/18


17:13 8/8/18


21:13 8/9/18


06:52


 


Bedside Glucose 190 mg/dl  221 mg/dl  200 mg/dl  


 


White Blood Count    7.05 K/uL 


 


Red Blood Count    3.41 M/uL 


 


Hemoglobin    9.9 g/dL 


 


Hematocrit    31.2 % 


 


Mean Corpuscular Volume    91.5 fL 


 


Mean Corpuscular Hemoglobin    29.0 pg 


 


Mean Corpuscular Hemoglobin


Concent 


  


  


  31.7 g/dl 


 


 


Platelet Count    322 K/uL 


 


Mean Platelet Volume    9.6 fL 


 


Neutrophils (%) (Auto)    62.6 % 


 


Lymphocytes (%) (Auto)    25.7 % 


 


Monocytes (%) (Auto)    8.5 % 


 


Eosinophils (%) (Auto)    2.8 % 


 


Basophils (%) (Auto)    0.3 % 


 


Neutrophils # (Auto)    4.41 K/uL 


 


Lymphocytes # (Auto)    1.81 K/uL 


 


Monocytes # (Auto)    0.60 K/uL 


 


Eosinophils # (Auto)    0.20 K/uL 


 


Basophils # (Auto)    0.02 K/uL 


 


RDW Standard Deviation    59.6 fL 


 


RDW Coefficient of Variation    17.7 % 


 


Immature Granulocyte % (Auto)    0.1 % 


 


Immature Granulocyte # (Auto)    0.01 K/uL 


 


Test


  8/9/18


08:11 


  


  


 


 


Bedside Glucose 135 mg/dl    











Assessment & Plan





CELLULITIS RIGHT FOOT / GANGRENE RIGHT GREAT TOE, 5TH TOE


8/7/18


1.  Right great toe partial amputation to the level of the mid proximal


phalanx.


2.  Removal of fifth toe nail plate.


3.  Irrigation and debridement of the fifth toe nail bed.


4.  Irrigation and debridement of right heel including skin, subcutaneous


fat, and fascia.





Per Ortho:


PT/ OT  heel weight bearing only with post op shoe


Dressing change 


DVT proph- ASA, Eliquis





Outpatient cultures grew MRSA and Pseudomonas.


on Dapto + Ceftolozane/Tazobactam


ID consulted, awaiting further recommendations





remains stable overall


PT/OT


may need Rehab





CAD


No anginal symptoms.


Continue lasix, aspirin, metoprolol, amlodipine.





AF


Continue metoprolol 


resume Eliquis


  


PULMONARY NODULES, RIGHT MIDDLE LOBE INFILTRATE


no respiratory symptoms


per patient's daughter Rupinder, patient is following with [facility] in [facility] for this 


will obtain previous records, last CT chest films


Pulmonary consulted, recommend outpatient follow up- discussed with Dr. Bragg





HYPERTENSION


increase Amlodipine from 2.5 to 5mg po daily


Continue metoprolol





PERIPHERAL VASCULAR DISEASE


Interventional Cardiology consulted


continue Pletal





DM TYPE 1


Insulin pump.


Blood sugars typically fluctuate.


Pharmacy consulted.





DYSLIPIDEMIA


Hold statin while receiving daptomycin.





DEMENTIA


Monitor for delirium.





MRSA


Contact precautions.





VTE PROPHYLAXIS


- Eliquis


 


RESUSCITATION STATUS


DNR





DISPOSITION


anticipate transition to Rehab/SNF after toe amputation


Family Medicine follow-up with Dr. Mallory.


Current Inpatient Medications:





Current Inpatient Medications








 Medications


  (Trade)  Dose


 Ordered  Sig/Amparo


 Route  Start Time


 Stop Time Status Last Admin


Dose Admin


 


 Acetaminophen


  (Tylenol Tab)  1,000 mg  Q6  PRN


 PO  8/2/18 18:00


 9/1/18 17:59  8/8/18 22:03


1,000 MG


 


 Apixaban


  (Eliquis)  5 mg  BID


 PO  8/2/18 21:00


 9/1/18 20:59 Future hold 8/9/18 08:18


5 MG


 


 Aspirin


  (Ecotrin Tab)  81 mg  DAILY


 PO  8/3/18 09:00


 9/2/18 08:59 Future hold 8/5/18 09:40


81 MG


 


 Cilostazol


  (Pletal Tab)  100 mg  BID


 PO  8/2/18 21:00


 9/1/18 20:59 Future hold 8/9/18 08:18


100 MG


 


 Citalopram


 Hydrobromide


  (celeXA TAB)  20 mg  DAILY


 PO  8/3/18 09:00


 9/2/18 08:59  8/8/18 08:48


20 MG


 


 Docusate Sodium


  (coLACE CAP)  100 mg  BID


 PO  8/2/18 21:00


 9/1/18 20:59  8/9/18 08:19


100 MG


 


 Donepezil HCl


  (Aricept Tab)  10 mg  DAILY


 PO  8/3/18 09:00


 9/2/18 08:59  8/8/18 08:49


10 MG


 


 Levothyroxine


 Sodium


  (Synthroid Tab)  88 mcg  DAILYBB


 PO  8/3/18 06:00


 9/2/18 06:59  8/9/18 05:44


88 MCG


 


 Memantine


  (Namenda Tab)  10 mg  BID


 PO  8/2/18 21:00


 9/1/18 20:59  8/9/18 08:18


10 MG


 


 Multivitamins


  (Multivitamin


 Tab)  1 tab  DAILY


 PO  8/3/18 09:00


 9/2/18 08:59  8/8/18 08:45


1 TAB


 


 Pantoprazole


 Sodium


  (Protonix Tab)  40 mg  DAILY


 PO  8/3/18 09:00


 9/2/18 08:59  8/8/18 08:43


40 MG


 


 Metoprolol


 Tartrate


  (Lopressor Tab)  25 mg  BID


 PO  8/2/18 21:00


 9/1/18 20:59  8/8/18 21:52


25 MG


 


 Mirtazapine


  (Remeron Tab)  7.5 mg  HS


 PO  8/2/18 21:00


 9/1/18 20:59  8/8/18 21:52


7.5 MG


 


 Calcium/Vitamin D


  (Caltrate Plus


 Tab)  1 tab  DAILY


 PO  8/3/18 09:00


 9/2/18 08:59  8/8/18 08:48


1 TAB


 


 Polyethylene


  (Miralax Powder


 Packet)  17 gm  DAILY


 PO  8/3/18 09:00


 9/2/18 08:59 Future hold 8/8/18 08:46


17 GM


 


 Miscellaneous


 Information


  (Consult


 Glycemic


 Management


 Pharmacy)  1 ea  UD  PRN


 N/A  8/2/18 19:42


 9/1/18 19:41   


 


 


 Daptomycin


  (Consult)  1 ea  UD  PRN


 N/A  8/2/18 20:15


 9/1/18 20:14   


 


 


 Daptomycin 400 mg/


 Syringe  8 ml @ 4


 mls/min  Q24H


 IV  8/2/18 22:00


 9/13/18 21:59  8/8/18 21:57


4 MLS/MIN


 


 Insulin Aspart


  (novoLOG INSULIN


 PUMP)  1 ea  ACHS


 N/A  8/2/18 21:00


 9/1/18 20:59 Future hold 8/9/18 07:57


1 EA


 


 Insulin Aspart


  (novoLOG ASPART)  SLIDING


 SCALE  PRN  PRN


 SC  8/2/18 20:45


 9/1/18 20:44 Future hold  


 


 


 Glucose


  (Glucose 40% Gel)  15-30


 GRAMS 15


 GRAMS...  UD  PRN


 PO  8/2/18 20:45


 9/1/18 20:44   


 


 


 Glucose


  (Glucose Chew


 Tab)  4-8


 Tablets 4


 Tabl...  UD  PRN


 PO  8/2/18 20:45


 9/1/18 20:44   


 


 


 Glucagon


  (Glucagon Inj)  1 mg  UD  PRN


 IM  8/2/18 20:45


 9/1/18 20:44   


 


 


 Dextrose


  (Dextrose 50%


 50ML Syringe)  25-50ML


 25ML FOR


 ...  UD  PRN


 IV  8/2/18 20:45


 9/1/18 20:44   


 


 


 Carbohydrates


  (Carbohydrates


 For Hypoglycemia)  15-30 GRAMS


 15 grams if


 BSG 54-69...  UD  PRN


 PO  8/2/18 20:45


 9/1/18 20:44  8/5/18 04:23


15 GM


 


 Ceftolozane/


 Tazobactam 1.5 gm/


 Dextrose  111.4 ml @ 


 111.4 mls/


 hr  Q8H


 IV  8/3/18 16:00


 8/13/18 15:59  8/9/18 07:57


111.4 MLS/HR


 


 Diphenhydramine


 HCl


  (Benadryl Cap)  25 mg  BID  PRN


 PO  8/3/18 16:30


 9/2/18 16:29   


 


 


 Insulin Aspart


  (novoLOG ASPART)  **SLIDING


 SCALE**


 **G...  AC


 SC  8/6/18 18:00


 9/5/18 17:59  8/9/18 08:24


3 UNITS


 


 Sodium Chloride  1,000 ml @ 


 15 mls/hr  Q24H


 IV  8/7/18 17:00


 9/6/18 16:59   


 


 


 Morphine Sulfate


  (MoRPHine


 SULFATE INJ)  3 mg  Q3HWA  PRN


 IV  8/8/18 00:00


 8/22/18 00:00  8/8/18 23:43


3 MG


 


 Tramadol HCl


  (Ultram Tab)  50 mg  Q6H  PRN


 PO  8/8/18 08:00


 9/7/18 07:59   


 


 


 Amlodipine


 Besylate


  (Norvasc Tab)  5 mg  DAILY


 PO  8/9/18 09:00


 9/2/18 08:59 UNV  


 


 


 Aspirin


  (Ecotrin Tab)  81 mg  QAM


 PO  8/9/18 09:00


 9/8/18 08:59 UNV

## 2018-08-09 NOTE — PROGRESS NOTE
Subjective


Date of Service:


Aug 9, 2018.


Subjective


pt s/p partial amp toe, tolerated well. remains on IV abx. h/o MRSA and 

pseudomonas from outpatient cultures. admitted for IV abx and amp of gangrene 

toe. Blood cultures negative and final. wbc nml. afebrile.





Problem List


Medical Problems:


(1) Failure of outpatient treatment


Status: Acute  





(2) Hyperglycemia


Status: Acute  





(3) Necrosis of toe


Status: Acute  











Objective


Vital Signs











  Date Time  Temp Pulse Resp B/P (MAP) Pulse Ox O2 Delivery O2 Flow Rate FiO2


 


8/9/18 08:56     92 Room Air  


 


8/9/18 08:11      Room Air  


 


8/9/18 07:38 36.9 68 17 161/58 (92) 92 Room Air  


 


8/9/18 00:28  68  161/61 (94)    


 


8/9/18 00:05 37.1 67 16 166/59 (94) 90 Room Air  


 


8/8/18 23:40      Room Air  


 


8/8/18 16:20     98 Room Air  


 


8/8/18 15:30 36.4 57 16 138/61 (86) 98 Room Air  


 


8/8/18 11:32 36.3 60 17 156/57 (90) 96 Room Air  


 


8/8/18 10:00      Room Air  











Laboratory Results











Item Value  Date Time


 


Blood Culture - Final Complete 8/2/18 1617





Blood NO GROWTH 


 


Blood Culture - Final Complete 8/2/18 1545





Blood NO GROWTH 











Last 24 Hours








Test


  8/8/18


12:16 8/8/18


17:13 8/8/18


21:13 8/9/18


06:52


 


Bedside Glucose 190 mg/dl  221 mg/dl  200 mg/dl  


 


White Blood Count    7.05 K/uL 


 


Red Blood Count    3.41 M/uL 


 


Hemoglobin    9.9 g/dL 


 


Hematocrit    31.2 % 


 


Mean Corpuscular Volume    91.5 fL 


 


Mean Corpuscular Hemoglobin    29.0 pg 


 


Mean Corpuscular Hemoglobin


Concent 


  


  


  31.7 g/dl 


 


 


Platelet Count    322 K/uL 


 


Mean Platelet Volume    9.6 fL 


 


Neutrophils (%) (Auto)    62.6 % 


 


Lymphocytes (%) (Auto)    25.7 % 


 


Monocytes (%) (Auto)    8.5 % 


 


Eosinophils (%) (Auto)    2.8 % 


 


Basophils (%) (Auto)    0.3 % 


 


Neutrophils # (Auto)    4.41 K/uL 


 


Lymphocytes # (Auto)    1.81 K/uL 


 


Monocytes # (Auto)    0.60 K/uL 


 


Eosinophils # (Auto)    0.20 K/uL 


 


Basophils # (Auto)    0.02 K/uL 


 


RDW Standard Deviation    59.6 fL 


 


RDW Coefficient of Variation    17.7 % 


 


Immature Granulocyte % (Auto)    0.1 % 


 


Immature Granulocyte # (Auto)    0.01 K/uL 


 


Test


  8/9/18


08:11 


  


  


 


 


Bedside Glucose 135 mg/dl    











Assessment and Plan





(1) Gangrene


Assessment & Plan:  will continue IV abx x 7 days post op. can follow with Dr. Stone in wound center post d/c.

## 2018-08-10 VITALS
SYSTOLIC BLOOD PRESSURE: 167 MMHG | TEMPERATURE: 98.96 F | DIASTOLIC BLOOD PRESSURE: 70 MMHG | HEART RATE: 72 BPM | OXYGEN SATURATION: 92 %

## 2018-08-10 VITALS
DIASTOLIC BLOOD PRESSURE: 74 MMHG | HEART RATE: 71 BPM | OXYGEN SATURATION: 91 % | TEMPERATURE: 98.24 F | SYSTOLIC BLOOD PRESSURE: 144 MMHG

## 2018-08-10 VITALS
SYSTOLIC BLOOD PRESSURE: 121 MMHG | OXYGEN SATURATION: 90 % | HEART RATE: 71 BPM | DIASTOLIC BLOOD PRESSURE: 71 MMHG | TEMPERATURE: 98.24 F

## 2018-08-10 VITALS
DIASTOLIC BLOOD PRESSURE: 49 MMHG | OXYGEN SATURATION: 99 % | SYSTOLIC BLOOD PRESSURE: 116 MMHG | HEART RATE: 62 BPM | TEMPERATURE: 97.7 F

## 2018-08-10 VITALS — OXYGEN SATURATION: 99 %

## 2018-08-10 VITALS
DIASTOLIC BLOOD PRESSURE: 56 MMHG | TEMPERATURE: 98.96 F | OXYGEN SATURATION: 93 % | HEART RATE: 73 BPM | SYSTOLIC BLOOD PRESSURE: 145 MMHG

## 2018-08-10 VITALS — OXYGEN SATURATION: 91 %

## 2018-08-10 LAB
BUN SERPL-MCNC: 29 MG/DL (ref 7–18)
CALCIUM SERPL-MCNC: 8.5 MG/DL (ref 8.5–10.1)
CO2 SERPL-SCNC: 31 MMOL/L (ref 21–32)
CREAT SERPL-MCNC: 0.96 MG/DL (ref 0.6–1.2)
CREAT SERPL-MCNC: 1.14 MG/DL (ref 0.6–1.2)
GLUCOSE SERPL-MCNC: 204 MG/DL (ref 70–99)
POTASSIUM SERPL-SCNC: 3.6 MMOL/L (ref 3.5–5.1)
SODIUM SERPL-SCNC: 135 MMOL/L (ref 136–145)

## 2018-08-10 RX ADMIN — CILOSTAZOL SCH MG: 100 TABLET ORAL at 08:50

## 2018-08-10 RX ADMIN — INSULIN ASPART SCH EA: 100 INJECTION, SOLUTION INTRAVENOUS; SUBCUTANEOUS at 21:00

## 2018-08-10 RX ADMIN — INSULIN ASPART SCH UNITS: 100 INJECTION, SOLUTION INTRAVENOUS; SUBCUTANEOUS at 12:41

## 2018-08-10 RX ADMIN — SODIUM CHLORIDE SCH MLS/HR: 900 INJECTION, SOLUTION INTRAVENOUS at 16:29

## 2018-08-10 RX ADMIN — INSULIN ASPART SCH EA: 100 INJECTION, SOLUTION INTRAVENOUS; SUBCUTANEOUS at 12:41

## 2018-08-10 RX ADMIN — INSULIN ASPART SCH UNITS: 100 INJECTION, SOLUTION INTRAVENOUS; SUBCUTANEOUS at 09:01

## 2018-08-10 RX ADMIN — CEFTOLOZANE AND TAZOBACTAM SCH MLS/HR: 1; .5 INJECTION, POWDER, LYOPHILIZED, FOR SOLUTION INTRAVENOUS at 00:18

## 2018-08-10 RX ADMIN — DAPTOMYCIN SCH MLS/MIN: 50 INJECTION, POWDER, LYOPHILIZED, FOR SOLUTION INTRAVENOUS at 21:59

## 2018-08-10 RX ADMIN — APIXABAN SCH MG: 5 TABLET, FILM COATED ORAL at 22:00

## 2018-08-10 RX ADMIN — Medication SCH GM: at 08:51

## 2018-08-10 RX ADMIN — CEFTOLOZANE AND TAZOBACTAM SCH MLS/HR: 1; .5 INJECTION, POWDER, LYOPHILIZED, FOR SOLUTION INTRAVENOUS at 23:54

## 2018-08-10 RX ADMIN — INSULIN ASPART SCH UNITS: 100 INJECTION, SOLUTION INTRAVENOUS; SUBCUTANEOUS at 18:42

## 2018-08-10 RX ADMIN — Medication SCH MG: at 08:50

## 2018-08-10 RX ADMIN — Medication SCH TAB: at 08:53

## 2018-08-10 RX ADMIN — Medication SCH TAB: at 08:54

## 2018-08-10 RX ADMIN — METOPROLOL TARTRATE SCH MG: 25 TABLET, FILM COATED ORAL at 08:54

## 2018-08-10 RX ADMIN — DOCUSATE SODIUM SCH MG: 100 CAPSULE, LIQUID FILLED ORAL at 08:51

## 2018-08-10 RX ADMIN — CILOSTAZOL SCH MG: 100 TABLET ORAL at 21:59

## 2018-08-10 RX ADMIN — DOCUSATE SODIUM SCH MG: 100 CAPSULE, LIQUID FILLED ORAL at 21:59

## 2018-08-10 RX ADMIN — MIRTAZAPINE SCH MG: 15 TABLET, FILM COATED ORAL at 22:00

## 2018-08-10 RX ADMIN — INSULIN ASPART SCH EA: 100 INJECTION, SOLUTION INTRAVENOUS; SUBCUTANEOUS at 17:15

## 2018-08-10 RX ADMIN — INSULIN ASPART SCH EA: 100 INJECTION, SOLUTION INTRAVENOUS; SUBCUTANEOUS at 08:00

## 2018-08-10 RX ADMIN — METOPROLOL TARTRATE SCH MG: 25 TABLET, FILM COATED ORAL at 21:59

## 2018-08-10 RX ADMIN — FUROSEMIDE SCH MG: 40 TABLET ORAL at 11:11

## 2018-08-10 RX ADMIN — LEVOTHYROXINE SODIUM SCH MCG: 88 TABLET ORAL at 05:33

## 2018-08-10 RX ADMIN — DONEPEZIL HYDROCHLORIDE SCH MG: 10 TABLET, FILM COATED ORAL at 08:54

## 2018-08-10 RX ADMIN — CEFTOLOZANE AND TAZOBACTAM SCH MLS/HR: 1; .5 INJECTION, POWDER, LYOPHILIZED, FOR SOLUTION INTRAVENOUS at 09:02

## 2018-08-10 RX ADMIN — PANTOPRAZOLE SCH MG: 40 TABLET, DELAYED RELEASE ORAL at 08:54

## 2018-08-10 RX ADMIN — CITALOPRAM HYDROBROMIDE SCH MG: 20 TABLET ORAL at 08:54

## 2018-08-10 RX ADMIN — CEFTOLOZANE AND TAZOBACTAM SCH MLS/HR: 1; .5 INJECTION, POWDER, LYOPHILIZED, FOR SOLUTION INTRAVENOUS at 16:29

## 2018-08-10 RX ADMIN — APIXABAN SCH MG: 5 TABLET, FILM COATED ORAL at 08:51

## 2018-08-10 RX ADMIN — MEMANTINE HYDROCHLORIDE SCH MG: 10 TABLET ORAL at 21:59

## 2018-08-10 RX ADMIN — AMLODIPINE BESYLATE SCH MG: 5 TABLET ORAL at 08:52

## 2018-08-10 RX ADMIN — MEMANTINE HYDROCHLORIDE SCH MG: 10 TABLET ORAL at 08:52

## 2018-08-10 NOTE — DISCHARGE INSTRUCTIONS
Discharge Instructions


Date of Service


Aug 10, 2018.





Admission


Reason for Admission:  Cellulitis Of R Foot, Chronic Ulcer Of Great R Toe





Discharge


Discharge Diagnosis / Problem:  Right foot cellulitis, gangrenous great big toe 

of the right foot





Discharge Goals


Goal(s):  Diagnostic testing, Therapeutic intervention





Activity Recommendations


Activity Level:  Assistance Required


Therapies:  Physical Therapy, Occupational Therapy





.Daily dressing changes to the right foot.  Keep an eye on her heel as well. (

breakdown?)


Weightbearing on the heel only with protective shoe


Ok to shower if you keep the dressing covered with a waterproof covering





Additional Information


Patient informed of condition:  Yes


Advance Directives:  No


DNR:  Yes


Level of Care:  Acute Rehab


Communicable Disease:  No


Prognosis:  Stable





Instructions / Follow-Up


Instructions / Follow-Up


Please follow orthopedic service recommendations:





Daily dressing changes to the right foot.  Keep an eye on her heel as well. (

breakdown?)


Weightbearing on the heel only with protective shoe


Ok to shower if you keep the dressing covered with a waterproof covering


Call if you have increased temp of 101.5 or greater, increased redness, 

increased swelling or drainage.  


Follow up with Dr Moseley in 14 days from the day of surgery.  Call for an 

appointment.  


Follow up with [facility] in [facility] c/o Dr Stone in 1 week.





Patient is a type I diabetic with insulin pump.


Pharmacy glycemic control service recommendations:


PLAN FOR INPATIENT GLYCEMIC CONTROL: 


* Basal insulin: no change 


 * Per SQ insulin pump outpatient settings





* Bolus insulin: nursing to administer SQ NovoLog for meal time coverage since 

patient cannot bolus herself from pump.


 * NovoLog per scale AC or Q6hrs while NPO


 * Goal Range:  Low 100 mg/dL - High 160 mg/dL


 * Correction Factor:  40 mg/dL/unit


 * Nutritional / Prandial insulin per carb ratio of  1 unit per 13 grams CHO 

consumed


      *** NO Novolog coverage at , as this tends to make patient hypoglycemic 

in the morning  (BSG should still be checked) ***








DISCHARGE PLANNING: 


* Patient's A1c (9.4%) indicates slightly suboptimal glycemic control.


* Expect that patient may resume home pump settings on discharge, as long as 

she will be able to operate her pump effectively.


 * Patient's daughter generally manages her pump.  Unsure that pump will be 

manageable at [facility]?


* Expect that patient's basal rate is reasonable, but that she may require 

slightly tighter CF/CR parameters.  


* It does sound like patient experiences episodes of hypoglycemia at home (

mainly in the morning), so would continue to omit HS coverage.


* If patient is NOT able to continue pump at [facility], consider:


 * Lantus 12 units SQ qAM


 * Novolog ~3-5 units with each meal





Please refer to accompanying hospital discharge summary for further details.





Fall precautions.  Aspiration precautions.





Current Hospital Diet


Patient's current hospital diet: AHA Diet (Heart Healthy), Diabetes Type 2 Diet





Discharge Diet


Recommended Diet:  AHA Diet (Heart Healthy), Diabetes Type 2 Diet





Procedures


Procedures Performed:  


1. Debridement of nail bed of right 5th toe; 


2. Partial Amputation of right great toe; 


3. Irrigation and Debridement of right heel ulcer including skin,


subcutaneous fat


& fascia


4. Removal nail plate 5th toe





Pending Studies


Studies pending at discharge:  no





Physician Orders On Transfer


Special Precautions:


Please follow orthopedic service recommendations:





Daily dressing changes to the right foot.  Keep an eye on her heel as well. (

breakdown?)


Weightbearing on the heel only with protective shoe


Ok to shower if you keep the dressing covered with a waterproof covering


Call if you have increased temp of 101.5 or greater, increased redness, 

increased swelling or drainage.  


Follow up with Dr Moseley in 14 days from the day of surgery.  Call for an 

appointment.  





Patient is a type I diabetic with insulin pump.


Pharmacy glycemic control service recommendations:


PLAN FOR INPATIENT GLYCEMIC CONTROL: 


* Basal insulin: no change 


 * Per SQ insulin pump outpatient settings





* Bolus insulin: nursing to administer SQ NovoLog for meal time coverage since 

patient cannot bolus herself from pump.


 * NovoLog per scale AC or Q6hrs while NPO


 * Goal Range:  Low 100 mg/dL - High 160 mg/dL


 * Correction Factor:  40 mg/dL/unit


 * Nutritional / Prandial insulin per carb ratio of  1 unit per 13 grams CHO 

consumed


      *** NO Novolog coverage at HS, as this tends to make patient hypoglycemic 

in the morning  (BSG should still be checked) ***








DISCHARGE PLANNING: 


* Patient's A1c (9.4%) indicates slightly suboptimal glycemic control.


* Expect that patient may resume home pump settings on discharge, as long as 

she will be able to operate her pump effectively.


 * Patient's daughter generally manages her pump.  Unsure that pump will be 

manageable at [facility]?


* Expect that patient's basal rate is reasonable, but that she may require 

slightly tighter CF/CR parameters.  


* It does sound like patient experiences episodes of hypoglycemia at home (

mainly in the morning), so would continue to omit HS coverage.


* If patient is NOT able to continue pump at [facility], consider:


 * Lantus 12 units SQ qAM


 * Novolog ~3-5 units with each meal





Please refer to accompanying hospital discharge summary for further details.





Fall precautions.  Aspiration precautions.





Laboratory Results





Hemoglobin A1c








Test


  8/2/18


15:45 Range/Units


 


 


Estimated Average Glucose 217   mg/dl


 


Hemoglobin A1c 9.2 H 4.5-5.6  %











Medical Emergencies








.


Who to Call and When:





Medical Emergencies:  If at any time you feel your situation is an emergency, 

please call 911 immediately.





.





Non-Emergent Contact


Non-Emergency issues call your:  Primary Care Provider, Surgeon


Call Non-Emergent contact if:  you have a fever, your pain is not controlled, 

your pain is worsening, your pain is unusual for you, your pain is concerning 

you, wound has increased drainage, wound has increased redness, wound has 

increased pain, you have any medication questions





.





Past History


Medical & Surgical History:  


(1) Non-healing wound of right heel


(2) Hx MRSA infection


(3) Hypoalbuminemia


(4) Gangrene


(5) Charcot foot due to diabetes mellitus


(6) Diabetic peripheral neuropathy associated with type 1 diabetes mellitus


(7) Status post partial amputation of foot


(8) History of diabetic ulcer of foot


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


(10) Cellulitis of right foot


(11) Type 1 diabetes mellitus


(12) Hypertension


(13) CAD (coronary artery disease)


(14) Hypothyroid


(15) Severe peripheral arterial disease


(16) Hyperlipidemia


(17) GERD (gastroesophageal reflux disease)


(18) Paroxysmal atrial fibrillation


(19) Diabetic polyneuropathy


(20) Atherosclerotic dementia


(21) Type 1 DM with CKD stage 3 and hypertension


(22) Peripheral vascular angioplasty status


(23) Amputated great toe of left foot


(24) Status post femorotibial bypass


(25) Hx of CABG


(26) S/P laparoscopic hysterectomy


(27) Hx of appendectomy


.








"Provider Documentation" section prepared by Omar Corcoran.








.





Consultant Recommendations


Consultant Recommendations:


Daily dressing changes to the right foot.  Keep an eye on her heel as well. (

breakdown?)


Weightbearing on the heel only with protective shoe


Ok to shower if you keep the dressing covered with a waterproof covering


Call if you have increased temp of 101.5 or greater, increased redness, 

increased swelling or drainage.  [phone]








Follow up with Dr Moseley in 14 days from the day of surgery.  Call for an 

appointment.  [phone]





Core Measure Problem


Core Measures:  None

## 2018-08-10 NOTE — DISCHARGE SUMMARY
Discharge Summary


Date of Service


Aug 10, 2018.





Discharge Summary


Admission Date:


Aug 2, 2018 at 17:54


Discharge Date:  Aug 10, 2018


Principal Diagnosis:


CELLULITIS RIGHT FOOT / GANGRENE RIGHT GREAT TOE, 5TH TOE;


s/p Surgery by Dr. Moseley 8/7/18


Secondary Diagnoses/Problems:


Please refer to hospital course below.


Procedures:


s/p Surgery by Dr. Moseley 8/7/18


   1.  Right great toe partial amputation to the level of the mid proximal


   phalanx.


   2.  Removal of fifth toe nail plate.


   3.  Irrigation and debridement of the fifth toe nail bed.


   4.  Irrigation and debridement of right heel including skin, subcutaneous


   fat, and fascia.





CT OF THE CHEST WITHOUT IV CONTRAST





CLINICAL HISTORY: Pneumonia versus lung mass.    





COMPARISON STUDY:  Chest radiograph August 6, 2018. 





CT DOSE: 207.46 mGycm





TECHNIQUE:  Axial images of the chest were obtained without IV contrast.  Images


were reviewed in the axial, sagittal, and coronal planes. IV contrast was not


administered for this examination.  A dose lowering technique was utilized


adhering to the principles of ALARA.








FINDINGS:  No enlarged axillary, mediastinal or hilar lymph nodes are present.


Heart is moderately enlarged. There are median sternotomy wires and postsurgical


findings consistent with bypass grafting. There is no pericardial effusion. No


pneumothorax or pleural effusion is noted. Lingular opacity is noted. There is


also a 2.3 x 1.4 cm nodular right middle lobe opacity shown on axial image 200


of 326. There are numerous pulmonary nodules, many of which have irregular


margins. These include a 1 cm right upper lobe nodule shown on image 117 and an


8 mm left upper lobe nodule shown on image 125. No cavitary lesions are present.


Bony thorax is unremarkable. Visualized portions of the upper abdomen


demonstrate extensive atherosclerotic calcification.





IMPRESSION:  





1. Right middle lobe and lingular opacities. The findings may reflect an


infectious etiology such as atypical mycobacterial infection. A 2.3 x 1.4 cm


nodular right middle lobe opacity is indeterminate. Although an


infectious/inflammatory process is favored, a neoplastic process could appear


similar and a follow-up chest CT in one month is recommended. Alternately,


bronchoscopy could be performed.





2. Numerous ill-defined nodules throughout the lungs. An infectious/inflammatory


process is favored however metastatic disease could appear similar. These


nodules should be assessed on follow-up chest CT.





3. No thoracic lymphadenopathy.





4. Moderate cardiomegaly and extensive coronary artery calcification.


Consultations:


Ortho Dr. Moseley, ID Dr. Guevara


Pending Studies/Follow-Up:


Please follow orthopedic service recommendations:





Daily dressing changes to the right foot.  Keep an eye on her heel as well. (

breakdown?)


Weightbearing on the heel only with protective shoe


Ok to shower if you keep the dressing covered with a waterproof covering


Call if you have increased temp of 101.5 or greater, increased redness, 

increased swelling or drainage.  


Follow up with Dr Moseley in 14 days from the day of surgery.  Call for an 

appointment.  


Follow up with ID Clinic in [facility] c/o Dr Stone in 1 week





Patient is a type I diabetic with insulin pump.


Pharmacy glycemic control service recommendations:


PLAN FOR INPATIENT GLYCEMIC CONTROL: 


* Basal insulin: no change 


 * Per SQ insulin pump outpatient settings





* Bolus insulin: nursing to administer SQ NovoLog for meal time coverage since 

patient cannot bolus herself from pump.


 * NovoLog per scale AC or Q6hrs while NPO


 * Goal Range:  Low 100 mg/dL - High 160 mg/dL


 * Correction Factor:  40 mg/dL/unit


 * Nutritional / Prandial insulin per carb ratio of  1 unit per 13 grams CHO 

consumed


      *** NO Novolog coverage at HS, as this tends to make patient hypoglycemic 

in the morning  (BSG should still be checked) ***








DISCHARGE PLANNING: 


* Patient's A1c (9.4%) indicates slightly suboptimal glycemic control.


* Expect that patient may resume home pump settings on discharge, as long as 

she will be able to operate her pump effectively.


 * Patient's daughter generally manages her pump.  Unsure that pump will be 

manageable at [facility]?


* Expect that patient's basal rate is reasonable, but that she may require 

slightly tighter CF/CR parameters.  


* It does sound like patient experiences episodes of hypoglycemia at home (

mainly in the morning), so would continue to omit HS coverage.


* If patient is NOT able to continue pump at [facility], consider:


 * Lantus 12 units SQ qAM


 * Novolog ~3-5 units with each meal





Please refer to hospital course below for further details.





Fall precautions.  Aspiration precautions.





Medication Reconciliation


New Medications:  


Ceftolozane Sulfate-Tazobactam (Zerbaxa 1-0.5 gm) 1 Inj Inj


1.5 GM IV Q8 for 7 Days





Daptomycin (Daptomycin) 500 Mg Inj


400 MG IV DAILY for 7 Days





Amlodipine Besylate (Amlodipine Besylate) 5 Mg Tab


5 MG PO DAILY for 30 Days, #30 TAB





Insulin Aspart (Novolog Flexpen) 100 Units/Ml Inj


0 UNITS SC AC for 30 Days


`GOAL RANGE = Low 100 MG/DL  High 150 MG/DL


  Correction Factor = 40 MG/DL/UNIT


  INS:CHO RATIO= 1 UNIT PER 13 GRAM CHO CONSUMED


 


 ***If patient NPO, do not hold correction factor insulin without an


 order.


 **LOOK ALIKE SOUND ALIKE**


Tramadol HCl (Tramadol HCl) 50 Mg Tab


50 MG PO Q6H PRN for Pain for 7 Days, #10 TAB





 


Changed Medications:  


Acetaminophen (Tylenol) 500 Mg Tab


1000 MG PO Q8H PRN for Pain for 7 Days (Changed from: Q6)





 


Continued Medications:  


Apixaban (Eliquis) 5 Mg Tab


5 MG PO BID





Aspirin (Aspirin Ec) 81 Mg Tab


81 MG PO DAILY





Calcium Carbonate-Vitamin D (Calcium + D) 1 Tab Tab


1 TAB PO DAILY





Cilostazol (Pletal) 100 Mg Tab


1 TAB PO BID





Citalopram Hydrobromide (Celexa) 20 Mg Tab


20 MG PO DAILY





Docusate Sodium (Colace) 100 Mg Cap


1 CAP PO BID





Donepezil Hydrochloride (Aricept) 10 Mg Tab


10 MG PO DAILY





Furosemide (Lasix) 40 Mg Tab


1 TAB PO DAILY for 30 Days, #30 TAB 5 Refills





Insulin Aspart (novoLOG INSULIN PUMP ) 1 Ea Inj


1 EA N/A UD





Levothyroxine Sodium (Synthroid) 88 Mcg Tab


88 MCG PO DAILY





Memantine Hcl (Namenda) 10 Mg Tab


10 MG PO BID





Metoprolol Tartrate (Lopressor) 25 Mg Tab


25 MG PO BID, TAB





Mirtazapine (Remeron) 15 Mg Tab


0.5 TAB PO HS for 30 Days, #30 TAB 3 Refills





Multiple Vitamin (Multivitamin) 1 Tab Tab


1 TAB PO DAILY





Pantoprazole (Protonix) 40 Mg Tab


40 MG PO DAILY





Polyethylene Glycol 3350 (Miralax) 1 Pow Pow


17 GM PO DAILY





 


Discontinued Medications:  


Amlodipine (Norvasc) 2.5 Mg Tab


2.5 MG PO DAILY, TAB





Rosuvastatin Calcium (Crestor) 10 Mg Tab


20





Sulfa/Trimethoprim (Bactrim Ds 800MG/160MG)  Tab


1 TAB PO BID for 30 Days, #60 TAB 3 Refills











Admission Information


HPI (per Admitting provider):


This is a 73-year-old female with a significant past medical history of type 1 

insulin-dependent diabetes mellitus, CAD status post prior CABG, PAD status 

post recent revascularization, hypothyroidism, hypertension, hyperlipidemia, PAF

, diabetic polyneuropathy, GERD, CKD stage III who presents to [facility] where she was referred from ID Dr. Stone secondary to MDR wound 

culture requiring IV antibiotics.  Patient was initially hospitalized in March 2018 requiring complex fem-tibial bypass.  Most recently hospitalized on 7/26/ 2018 due to bilateral lower extremity angiogram with right SFA/popliteal 

angioplasty with drug-eluting balloon by Dr. Levy which was successful.  She 

has chronic right heel wound, great toe necrosis which warranted 

revascularization prior to possible further procedures per daughter.  She 

follows closely with infectious disease and wound clinic.  Wound culture of 

right heel grew positive MRSA, multidrug resistant Pseudomonas requiring IV 

antibiotic therapy, therefore she is referred for admission.  Daughter is 

present, provides most of history.  Currently patient denies pain, fever, chills

, sweats, chest pain, shortness of breath, dizziness, lightheadedness, nausea, 

vomiting, diarrhea, UTI sx.  Overall good appetite, in which she takes remeron 

for.  Per daughter patient has been having elevated blood sugars secondary to 

frequent hospitalizations, infections.  She states "I just think she needs this 

toe off and be done with it."  In ED initial evaluation revealed WBC 7.15, 

hemoglobin 10.4, hematocrit 31.9, platelets 264, sodium 133, potassium 4.8, 

chloride 96, BUN/creatinine 25, 1.10, glucose 386.  She was given a dose of IV 

Vanco, IV gentamicin, 10 units of IV regular insulin secondary to 

hyperglycemia.  ESR, CRP elevated at 79 and 4.8 respectively.  Her lactic acid 

was 1.5.  CT scan right foot revealed soft tissue edema, no evidence of 

osteomyelitis.  She is being admitted for IV antibiotic therapy.


Physical Exam (per Admitting):  


   General Appearance:  no apparent distress, + thin (F, appears age, lying in 

bed)


   Head:  normocephalic, atraumatic


   Eyes:  normal inspection, PERRL


   ENT:  normal ENT inspection, hearing grossly normal, + pertinent finding (

Mucous membranes moist)


   Neck:  supple, no adenopathy


   Respiratory/Chest:  chest non-tender, lungs clear, normal breath sounds, no 

respiratory distress, no accessory muscle use


   Cardiovascular:  regular rate, rhythm, + systolic murmur (2/6 holosystolic 

heard best at apex, radiates to carotid), + abnormal peripheral pulses (

Diminished pedal pulses and PT pulses R>L, )


   Abdomen/GI:  normal bowel sounds, non tender, soft, no organomegaly


   Genitourinary - Female:  + pertinent finding (Left inginual scar from skin/

muscle graft healed; right inguinal vascular access site, CDI no erythema)


   Back:  normal inspection, normal range of motion


   Extremities/Musculoskelatal:  + pedal edema (Right with warmth to touch, 

mild erythema), + pertinent finding (+Right heel ulcer with treatment intact, R 

necrotic great toe down to base, with minimal serosanginous drainage at base.)


   Neurologic/Psych:  alert, normal mood/affect, oriented x 3 (to basics only)


   Skin:  normal color, no rash





Hospital Course





(1) Cellulitis of right foot


(2) Non-healing wound of right heel


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


(4) Severe peripheral arterial disease


(5) Type 1 diabetes mellitus


(6) Hypoalbuminemia


(7) CAD (coronary artery disease)


(8) Paroxysmal atrial fibrillation


(9) Hypertension


(10) Hyperlipidemia


(11) Hypothyroid


(12) Atherosclerotic dementia


(13) Type 1 DM with CKD stage 3 and hypertension


(14) Diabetic peripheral neuropathy associated with type 1 diabetes mellitus


(15) GERD (gastroesophageal reflux disease)


(16) Hx MRSA infection





CELLULITIS RIGHT FOOT / GANGRENE RIGHT GREAT TOE, 5TH TOE


s/p Surgery by Dr. Moseley 8/7/18


   1.  Right great toe partial amputation to the level of the mid proximal


   phalanx.


   2.  Removal of fifth toe nail plate.


   3.  Irrigation and debridement of the fifth toe nail bed.


   4.  Irrigation and debridement of right heel including skin, subcutaneous


   fat, and fascia.





doing well post op


cleared for discharge by Ortho





Per Ortho:


   Daily dressing changes to the right foot.  Keep an eye on her heel as well. (

breakdown?)


   Weightbearing on the heel only with protective shoe.


   Ok to shower if you keep the dressing covered with a waterproof covering.


   Call if you have increased temp of 101.5 or greater, increased redness, 

increased swelling or drainage.  


   Follow up with Dr Moseley in 14 days from the day of surgery.  Call for an 

appointment.  








Outpatient cultures grew MRSA and Pseudomonas.


   on Dapto + Ceftolozane/Tazobactam Day 8


   ID consulted Dr. Guevara


   recommend to continue Daptomycin and Ceftolozane/Tazobactam IV x 7 more days


   Endurance IV line placed


   Follow up with ID Clinic in [facility] c/o Dr Stone in 1 week








continue PT/OT at [facility]








CAD


No anginal symptoms.


Continue lasix, aspirin, metoprolol, amlodipine.





ATRIAL FIBRILLATION


Continue metoprolol, Eliquis


  


PULMONARY NODULES, RIGHT MIDDLE LOBE INFILTRATE


no respiratory symptoms


per patient's daughter Rupinder, patient is following with [facility] in [facility] for this 


Pulmonary consulted, recommend outpatient follow up


discussed with Dr. Bragg


- continue follow up with Pulmonary Specialist 





HYPERTENSION


increased Amlodipine from 2.5 to 5mg po daily


Continue metoprolol


BP improving


monitor





PERIPHERAL VASCULAR DISEASE


Interventional Cardiology consulted


continue Pletal





DM TYPE 1


Insulin pump.


Pharmacy consulted, recommendations:


PLAN FOR INPATIENT GLYCEMIC CONTROL: 


* Basal insulin: no change 


 * Per SQ insulin pump outpatient settings





* Bolus insulin: nursing to administer SQ NovoLog for meal time coverage since 

patient cannot bolus herself from pump.


 * NovoLog per scale AC or Q6hrs while NPO


 * Goal Range:  Low 100 mg/dL - High 160 mg/dL


 * Correction Factor:  40 mg/dL/unit


 * Nutritional / Prandial insulin per carb ratio of  1 unit per 13 grams CHO 

consumed


      *** NO Novolog coverage at HS, as this tends to make patient hypoglycemic 

in the morning  (BSG should still be checked) ***








DISCHARGE PLANNING: 


* Patient's A1c (9.4%) indicates slightly suboptimal glycemic control.


* Expect that patient may resume home pump settings on discharge, as long as 

she will be able to operate her pump effectively.


 * Patient's daughter generally manages her pump.  Unsure that pump will be 

manageable at [facility]?


* Expect that patient's basal rate is reasonable, but that she may require 

slightly tighter CF/CR parameters.  


* It does sound like patient experiences episodes of hypoglycemia at home (

mainly in the morning), so would continue to omit HS coverage.


* If patient is NOT able to continue pump at [facility], consider:


 * Lantus 12 units SQ qAM


 * Novolog ~3-5 units with each meal





DYSLIPIDEMIA


Hold statin while receiving daptomycin.





DEMENTIA


Monitor for delirium.





MRSA


Contact precautions.





VTE PROPHYLAXIS


- on Eliquis already for A fib


 


RESUSCITATION STATUS


DNR





DISPOSITION


transition to [facility]


Follow up with Dr Moseley in 14 days from the day of surgery.  Call for an 

appointment.  


Follow up with ID Clinic in [facility] c/o Dr Stone in 1 week


Family Medicine follow-up with Dr. Mallory.


Total time spent on discharge = 50 minutes


This includes examination of the patient, discharge planning, medication 

reconciliation, and communication with other providers.





Discharge Instructions


Discharge Instructions


Date of Service


Aug 10, 2018.





Admission


Reason for Admission:  Cellulitis Of R Foot, Chronic Ulcer Of Great R Toe





Discharge


Discharge Diagnosis / Problem:  Right foot cellulitis, gangrenous great big toe 

of the right foot





Discharge Goals


Goal(s):  Diagnostic testing, Therapeutic intervention





Activity Recommendations


Activity Level:  Assistance Required


Therapies:  Physical Therapy, Occupational Therapy





.Daily dressing changes to the right foot.  Keep an eye on her heel as well. (

breakdown?)


Weightbearing on the heel only with protective shoe


Ok to shower if you keep the dressing covered with a waterproof covering





Additional Information


Patient informed of condition:  Yes


Advance Directives:  No


DNR:  Yes


Level of Care:  Acute Rehab


Communicable Disease:  No


Prognosis:  Stable





Instructions / Follow-Up


Instructions / Follow-Up


Please follow orthopedic service recommendations:





Daily dressing changes to the right foot.  Keep an eye on her heel as well. (

breakdown?)


Weightbearing on the heel only with protective shoe


Ok to shower if you keep the dressing covered with a waterproof covering


Call if you have increased temp of 101.5 or greater, increased redness, 

increased swelling or drainage.  


Follow up with Dr Moseley in 14 days from the day of surgery.  Call for an 

appointment.  





Patient is a type I diabetic with insulin pump.


Pharmacy glycemic control service recommendations:


PLAN FOR INPATIENT GLYCEMIC CONTROL: 


* Basal insulin: no change 


 * Per SQ insulin pump outpatient settings





* Bolus insulin: nursing to administer SQ NovoLog for meal time coverage since 

patient cannot bolus herself from pump.


 * NovoLog per scale AC or Q6hrs while NPO


 * Goal Range:  Low 100 mg/dL - High 160 mg/dL


 * Correction Factor:  40 mg/dL/unit


 * Nutritional / Prandial insulin per carb ratio of  1 unit per 13 grams CHO 

consumed


      *** NO Novolog coverage at HS, as this tends to make patient hypoglycemic 

in the morning  (BSG should still be checked) ***








DISCHARGE PLANNING: 


* Patient's A1c (9.4%) indicates slightly suboptimal glycemic control.


* Expect that patient may resume home pump settings on discharge, as long as 

she will be able to operate her pump effectively.


 * Patient's daughter generally manages her pump.  Unsure that pump will be 

manageable at [facility]?


* Expect that patient's basal rate is reasonable, but that she may require 

slightly tighter CF/CR parameters.  


* It does sound like patient experiences episodes of hypoglycemia at home (

mainly in the morning), so would continue to omit HS coverage.


* If patient is NOT able to continue pump at [facility], consider:


 * Lantus 12 units SQ qAM


 * Novolog ~3-5 units with each meal





Please refer to accompanying hospital discharge summary for further details.





Fall precautions.  Aspiration precautions.





Current Hospital Diet


Patient's current hospital diet: AHA Diet (Heart Healthy), Diabetes Type 2 Diet





Discharge Diet


Recommended Diet:  AHA Diet (Heart Healthy), Diabetes Type 2 Diet





Procedures


Procedures Performed:  


1. Debridement of nail bed of right 5th toe; 


2. Partial Amputation of right great toe; 


3. Irrigation and Debridement of right heel ulcer including skin,


subcutaneous fat


& fascia


4. Removal nail plate 5th toe





Pending Studies


Studies pending at discharge:  no





Physician Orders On Transfer


Special Precautions:


Please follow orthopedic service recommendations:





Daily dressing changes to the right foot.  Keep an eye on her heel as well. (

breakdown?)


Weightbearing on the heel only with protective shoe


Ok to shower if you keep the dressing covered with a waterproof covering


Call if you have increased temp of 101.5 or greater, increased redness, 

increased swelling or drainage.  [phone]


Follow up with Dr Moseley in 14 days from the day of surgery.  Call for an 

appointment.  [phone]





Patient is a type I diabetic with insulin pump.


Pharmacy glycemic control service recommendations:


PLAN FOR INPATIENT GLYCEMIC CONTROL: 


* Basal insulin: no change 


 * Per SQ insulin pump outpatient settings





* Bolus insulin: nursing to administer SQ NovoLog for meal time coverage since 

patient cannot bolus herself from pump.


 * NovoLog per scale AC or Q6hrs while NPO


 * Goal Range:  Low 100 mg/dL - High 160 mg/dL


 * Correction Factor:  40 mg/dL/unit


 * Nutritional / Prandial insulin per carb ratio of  1 unit per 13 grams CHO 

consumed


      *** NO Novolog coverage at HS, as this tends to make patient hypoglycemic 

in the morning  (BSG should still be checked) ***








DISCHARGE PLANNING: 


* Patient's A1c (9.4%) indicates slightly suboptimal glycemic control.


* Expect that patient may resume home pump settings on discharge, as long as 

she will be able to operate her pump effectively.


 * Patient's daughter generally manages her pump.  Unsure that pump will be 

manageable at [facility]?


* Expect that patient's basal rate is reasonable, but that she may require 

slightly tighter CF/CR parameters.  


* It does sound like patient experiences episodes of hypoglycemia at home (

mainly in the morning), so would continue to omit HS coverage.


* If patient is NOT able to continue pump at [facility], consider:


 * Lantus 12 units SQ qAM


 * Novolog ~3-5 units with each meal





Please refer to accompanying hospital discharge summary for further details.





Fall precautions.  Aspiration precautions.





Laboratory Results





Hemoglobin A1c








Test


  8/2/18


15:45 Range/Units


 


 


Estimated Average Glucose 217   mg/dl


 


Hemoglobin A1c 9.2 H 4.5-5.6  %











Medical Emergencies








.


Who to Call and When:





Medical Emergencies:  If at any time you feel your situation is an emergency, 

please call 911 immediately.





.





Non-Emergent Contact


Non-Emergency issues call your:  Primary Care Provider, Surgeon


Call Non-Emergent contact if:  you have a fever, your pain is not controlled, 

your pain is worsening, your pain is unusual for you, your pain is concerning 

you, wound has increased drainage, wound has increased redness, wound has 

increased pain, you have any medication questions





.





Past History


Medical & Surgical History:  


(1) Non-healing wound of right heel


(2) Hx MRSA infection


(3) Hypoalbuminemia


(4) Gangrene


(5) Charcot foot due to diabetes mellitus


(6) Diabetic peripheral neuropathy associated with type 1 diabetes mellitus


(7) Status post partial amputation of foot


(8) History of diabetic ulcer of foot


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


(10) Cellulitis of right foot


(11) Type 1 diabetes mellitus


(12) Hypertension


(13) CAD (coronary artery disease)


(14) Hypothyroid


(15) Severe peripheral arterial disease


(16) Hyperlipidemia


(17) GERD (gastroesophageal reflux disease)


(18) Paroxysmal atrial fibrillation


(19) Diabetic polyneuropathy


(20) Atherosclerotic dementia


(21) Type 1 DM with CKD stage 3 and hypertension


(22) Peripheral vascular angioplasty status


(23) Amputated great toe of left foot


(24) Status post femorotibial bypass


(25) Hx of CABG


(26) S/P laparoscopic hysterectomy


(27) Hx of appendectomy


.








"Provider Documentation" section prepared by Omar Corcoran.








.





Consultant Recommendations


Consultant Recommendations:


Daily dressing changes to the right foot.  Keep an eye on her heel as well. (

breakdown?)


Weightbearing on the heel only with protective shoe


Ok to shower if you keep the dressing covered with a waterproof covering


Call if you have increased temp of 101.5 or greater, increased redness, 

increased swelling or drainage.  [phone]








Follow up with Dr Moseley in 14 days from the day of surgery.  Call for an 

appointment.  [phone]





Core Measure Problem


Core Measures:  None

## 2018-08-10 NOTE — PHARMACY PROGRESS NOTE
Pharmacy Glycemic Short Note 2


Date of Service


Aug 10, 2018.





OUTPATIENT ANTIDIABETIC REGIMEN: 


* NovoLog SQ insulin pump


 * Basal Rate = 0.525 units/hr


 * Bolus per parameters:


 * Goal range = 110-150 mg/dl


 * CF = 60 mg/dl/unit


 * CR = 1 unit for every 18g CHO consumed


* HbA1c:  9.4%  (8/2/18)








ASSESSMENT:


* Ms Reyes is POD #3 s/p toe amputation.


* Patient has been utilizing her insulin pump to provide basal insulin only.  

BSGs have fluctuated (which is normal for her) but have been relatively stable 

the past few days.


* Patient has become increasingly hyperglycemic since last evening ( --> 

259 --> 204 --> 324 --> 431).


* Phone call placed to RN this morning to confirm that insulin pump is still 

running and delivering basal insulin.  Per RN, everything looks okay with the 

pump.


 * CDE to assist patient with changing pump site and refill pump this morning.  

Will see if that helps.


 * Pump site changed and canister filled at lunchtime today.  


 * Pre-lunch :  5 units IV insulin administered


* RN notes that patient has been asking for snacks/soda and was found to be 

eating some crackers in her room at one point.  Perhaps patient is snacking and 

not notifying nursing staff?  This may account for some of her hyperglycemia?


* Expected discharge to [facility] soon.





 





PLAN FOR INPATIENT GLYCEMIC CONTROL: 


* Basal insulin: no change 


 * Per SQ insulin pump outpatient settings





* Bolus insulin: nursing to administer SQ NovoLog for meal time coverage since 

patient cannot bolus herself from pump.


 * NovoLog per scale AC or Q6hrs while NPO


 * Goal Range:  Low 100 mg/dL - High 160 mg/dL


 * Correction Factor:  40 mg/dL/unit


 * Nutritional / Prandial insulin per carb ratio of  1 unit per 13 grams CHO 

consumed


      *** NO Novolog coverage at , as this tends to make patient hypoglycemic 

in the morning  (BSG should still be checked) ***








DISCHARGE PLANNING: 


* Patient's A1c (9.4%) indicates slightly suboptimal glycemic control.


* Expect that patient may resume home pump settings on discharge, as long as 

she will be able to operate her pump effectively.


 * Patient's daughter generally manages her pump.  Unsure that pump will be 

manageable at [facility]?


* Expect that patient's basal rate is reasonable, but that she may require 

slightly tighter CF/CR parameters.  


* It does sound like patient experiences episodes of hypoglycemia at home (

mainly in the morning), so would continue to omit HS coverage.


* If patient is NOT able to continue pump at [facility], consider:


 * Lantus 12 units SQ qAM


 * Novolog ~3-5 units with each meal

## 2018-08-11 VITALS
HEART RATE: 67 BPM | DIASTOLIC BLOOD PRESSURE: 57 MMHG | SYSTOLIC BLOOD PRESSURE: 163 MMHG | OXYGEN SATURATION: 94 % | TEMPERATURE: 98.24 F

## 2018-08-11 VITALS
SYSTOLIC BLOOD PRESSURE: 147 MMHG | TEMPERATURE: 98.24 F | OXYGEN SATURATION: 95 % | HEART RATE: 62 BPM | DIASTOLIC BLOOD PRESSURE: 83 MMHG

## 2018-08-11 VITALS
HEART RATE: 63 BPM | SYSTOLIC BLOOD PRESSURE: 151 MMHG | TEMPERATURE: 97.88 F | OXYGEN SATURATION: 96 % | DIASTOLIC BLOOD PRESSURE: 54 MMHG

## 2018-08-11 VITALS
SYSTOLIC BLOOD PRESSURE: 107 MMHG | HEART RATE: 70 BPM | DIASTOLIC BLOOD PRESSURE: 57 MMHG | OXYGEN SATURATION: 96 % | TEMPERATURE: 98.6 F

## 2018-08-11 VITALS — DIASTOLIC BLOOD PRESSURE: 56 MMHG | SYSTOLIC BLOOD PRESSURE: 153 MMHG | HEART RATE: 66 BPM

## 2018-08-11 LAB — CREAT SERPL-MCNC: 0.85 MG/DL (ref 0.6–1.2)

## 2018-08-11 RX ADMIN — DAPTOMYCIN SCH MLS/MIN: 50 INJECTION, POWDER, LYOPHILIZED, FOR SOLUTION INTRAVENOUS at 21:16

## 2018-08-11 RX ADMIN — METOPROLOL TARTRATE SCH MG: 25 TABLET, FILM COATED ORAL at 21:02

## 2018-08-11 RX ADMIN — INSULIN ASPART SCH UNITS: 100 INJECTION, SOLUTION INTRAVENOUS; SUBCUTANEOUS at 08:46

## 2018-08-11 RX ADMIN — MEMANTINE HYDROCHLORIDE SCH MG: 10 TABLET ORAL at 21:01

## 2018-08-11 RX ADMIN — CEFTOLOZANE AND TAZOBACTAM SCH MLS/HR: 1; .5 INJECTION, POWDER, LYOPHILIZED, FOR SOLUTION INTRAVENOUS at 15:41

## 2018-08-11 RX ADMIN — AMLODIPINE BESYLATE SCH MG: 5 TABLET ORAL at 08:47

## 2018-08-11 RX ADMIN — CEFTOLOZANE AND TAZOBACTAM SCH MLS/HR: 1; .5 INJECTION, POWDER, LYOPHILIZED, FOR SOLUTION INTRAVENOUS at 07:57

## 2018-08-11 RX ADMIN — CILOSTAZOL SCH MG: 100 TABLET ORAL at 21:02

## 2018-08-11 RX ADMIN — INSULIN ASPART SCH UNITS: 100 INJECTION, SOLUTION INTRAVENOUS; SUBCUTANEOUS at 18:29

## 2018-08-11 RX ADMIN — DOCUSATE SODIUM SCH MG: 100 CAPSULE, LIQUID FILLED ORAL at 08:49

## 2018-08-11 RX ADMIN — CILOSTAZOL SCH MG: 100 TABLET ORAL at 08:48

## 2018-08-11 RX ADMIN — PANTOPRAZOLE SCH MG: 40 TABLET, DELAYED RELEASE ORAL at 08:50

## 2018-08-11 RX ADMIN — Medication SCH GM: at 08:49

## 2018-08-11 RX ADMIN — LEVOTHYROXINE SODIUM SCH MCG: 88 TABLET ORAL at 05:33

## 2018-08-11 RX ADMIN — INSULIN ASPART SCH EA: 100 INJECTION, SOLUTION INTRAVENOUS; SUBCUTANEOUS at 17:15

## 2018-08-11 RX ADMIN — MIRTAZAPINE SCH MG: 15 TABLET, FILM COATED ORAL at 21:00

## 2018-08-11 RX ADMIN — CITALOPRAM HYDROBROMIDE SCH MG: 20 TABLET ORAL at 08:50

## 2018-08-11 RX ADMIN — Medication SCH TAB: at 08:50

## 2018-08-11 RX ADMIN — METOPROLOL TARTRATE SCH MG: 25 TABLET, FILM COATED ORAL at 08:51

## 2018-08-11 RX ADMIN — APIXABAN SCH MG: 5 TABLET, FILM COATED ORAL at 21:01

## 2018-08-11 RX ADMIN — INSULIN ASPART SCH EA: 100 INJECTION, SOLUTION INTRAVENOUS; SUBCUTANEOUS at 12:00

## 2018-08-11 RX ADMIN — MEMANTINE HYDROCHLORIDE SCH MG: 10 TABLET ORAL at 08:48

## 2018-08-11 RX ADMIN — INSULIN ASPART SCH UNITS: 100 INJECTION, SOLUTION INTRAVENOUS; SUBCUTANEOUS at 12:57

## 2018-08-11 RX ADMIN — INSULIN ASPART SCH EA: 100 INJECTION, SOLUTION INTRAVENOUS; SUBCUTANEOUS at 07:58

## 2018-08-11 RX ADMIN — APIXABAN SCH MG: 5 TABLET, FILM COATED ORAL at 08:48

## 2018-08-11 RX ADMIN — DONEPEZIL HYDROCHLORIDE SCH MG: 10 TABLET, FILM COATED ORAL at 08:49

## 2018-08-11 RX ADMIN — INSULIN ASPART SCH EA: 100 INJECTION, SOLUTION INTRAVENOUS; SUBCUTANEOUS at 20:59

## 2018-08-11 RX ADMIN — Medication SCH MG: at 08:48

## 2018-08-11 RX ADMIN — DOCUSATE SODIUM SCH MG: 100 CAPSULE, LIQUID FILLED ORAL at 21:02

## 2018-08-11 RX ADMIN — FUROSEMIDE SCH MG: 40 TABLET ORAL at 08:51

## 2018-08-11 NOTE — PHARMACY PROGRESS NOTE
Pharmacy Glycemic Short Note 2


Date of Service


Aug 11, 2018.





OUTPATIENT ANTIDIABETIC REGIMEN: 


* NovoLog SQ insulin pump


 * Basal Rate = 0.525 units/hr


 * Bolus per parameters:


 * Goal range = 110-150 mg/dl


 * CF = 60 mg/dl/unit


 * CR = 1 unit for every 18g CHO consumed


* HbA1c:  9.4%  (8/2/18)














Test


  8/10/18


12:06 8/10/18


13:30 8/10/18


14:58 8/10/18


16:50


 


Bedside Glucose


  431 mg/dl


(70-90) 382 mg/dl


(70-90) 


  162 mg/dl


(70-90)


 


Random Glucose


  


  


  204 mg/dl


(70-99) 


 


 


Test


  8/10/18


21:08 8/11/18


08:10 


  


 


 


Bedside Glucose


  263 mg/dl


(70-90) 266 mg/dl


(70-90) 


  


 














ASSESSMENT:





8/11/18


* Ms. Reyes received 25 units of bolus insulin yesterday, in addition to her 

basal being given through her pump


* BSGs were improved yesterday afternoon but are elevated again this AM


* It seems unlikely there is an issue with her pump - site was just changed, 

etc yesterday by CDE


* BSG elevation could just be secondary to infection.  Will plan to tighten 

Novolog parameters but keep basal the same since I don't want to change pump 

settings





8/10/18


* Ms Reyes is POD #3 s/p toe amputation.


* Patient has been utilizing her insulin pump to provide basal insulin only.  

BSGs have fluctuated (which is normal for her) but have been relatively stable 

the past few days.


* Patient has become increasingly hyperglycemic since last evening ( --> 

259 --> 204 --> 324 --> 431).


* Phone call placed to RN this morning to confirm that insulin pump is still 

running and delivering basal insulin.  Per RN, everything looks okay with the 

pump.


 * CDE to assist patient with changing pump site and refill pump this morning.  

Will see if that helps.


 * Pump site changed and canister filled at lunchtime today.  


 * Pre-lunch :  5 units IV insulin administered


* RN notes that patient has been asking for snacks/soda and was found to be 

eating some crackers in her room at one point.  Perhaps patient is snacking and 

not notifying nursing staff?  This may account for some of her hyperglycemia?


* Expected discharge to [facility] soon.





 





PLAN FOR INPATIENT GLYCEMIC CONTROL: 


* Basal insulin: no change 


 * Per SQ insulin pump outpatient settings





* Bolus insulin: Tighten parameters; nursing to administer SQ NovoLog for meal 

time coverage since patient cannot bolus herself from pump.


 * NovoLog per scale AC or Q6hrs while NPO


 * Goal Range:  Low 100 mg/dL - High 160 mg/dL


 * Correction Factor:  35 mg/dL/unit


 * Nutritional / Prandial insulin per carb ratio of  1 unit per 11 grams CHO 

consumed


      *** NO Novolog coverage at HS, as this tends to make patient hypoglycemic 

in the morning  (BSG should still be checked) ***








DISCHARGE PLANNING: 





From 8/10:


* Patient's A1c (9.4%) indicates slightly suboptimal glycemic control.


* Expect that patient may resume home pump settings on discharge, as long as 

she will be able to operate her pump effectively.


 * Patient's daughter generally manages her pump.  Unsure that pump will be 

manageable at [facility]?


* Expect that patient's basal rate is reasonable, but that she may require 

slightly tighter CF/CR parameters.  


* It does sound like patient experiences episodes of hypoglycemia at home (

mainly in the morning), so would continue to omit HS coverage.


* If patient is NOT able to continue pump at [facility], consider:


 * Lantus 12 units SQ qAM


 * Novolog ~3-5 units with each meal

## 2018-08-11 NOTE — PROGRESS NOTE
Medicine Progress Note


Date & Time of Visit:


Aug 11, 2018 at 15:09.


Subjective


patient seen resting in bedside chair


just had lunch


states she feels fine overall


BSG improved compared to yesterday but still in the 200s


denies symptoms





Objective





Last 8 Hrs








  Date Time  Temp Pulse Resp B/P (MAP) Pulse Ox O2 Delivery O2 Flow Rate FiO2


 


8/11/18 11:58 36.6 63 18 151/54 (86) 96 Room Air  


 


8/11/18 08:00      Room Air  


 


8/11/18 07:59 36.8 67 18 163/57 (92) 94 Room Air  








Physical Exam:





General- oriented x 2, not in distress, speaks in sentences with no effort





Eyes- anicteric





Lungs- CLear BS bilaterally


          no rales/wheezes





Heart- regular rhythm; no murmur, normal rate 





Abdomen- normal bowel sounds, soft, nontender





Extremities- right lower leg: dressing in place


                 left: no pretibial edema, no calf tenderness





Neuro- alert, oriented x 2


          no gross focal neuro deficits





Skin- warm & dry


Laboratory Results:





Last 24 Hours








Test


  8/10/18


16:50 8/10/18


21:08 8/11/18


07:20 8/11/18


08:10


 


Bedside Glucose 162 mg/dl  263 mg/dl   266 mg/dl 


 


Creatinine   0.85 mg/dl  


 


Est Creatinine Clear Calc


Drug Dose 


  


  54.2 ml/min 


  


 


 


Estimated GFR (


American) 


  


  77.7 


  


 


 


Estimated GFR (Non-


American 


  


  67.0 


  


 


 


Test


  8/11/18


14:43 


  


  


 


 


Bedside Glucose 264 mg/dl    











Assessment & Plan





CELLULITIS RIGHT FOOT / GANGRENE RIGHT GREAT TOE, 5TH TOE


s/p Surgery by Dr. Moseley 8/7/18


   1.  Right great toe partial amputation to the level of the mid proximal


   phalanx.


   2.  Removal of fifth toe nail plate.


   3.  Irrigation and debridement of the fifth toe nail bed.


   4.  Irrigation and debridement of right heel including skin, subcutaneous


   fat, and fascia.





doing well post op


cleared for discharge by Ortho





Per Ortho:


   Daily dressing changes to the right foot.  Keep an eye on her heel as well. (

breakdown?)


   Weightbearing on the heel only with protective shoe.


   Ok to shower if you keep the dressing covered with a waterproof covering.


   Call if you have increased temp of 101.5 or greater, increased redness, 

increased swelling or drainage.  [phone]


   Follow up with Dr Moseley in 14 days from the day of surgery.  Call for an 

appointment.  








Outpatient cultures grew MRSA and Pseudomonas.


   on Dapto + Ceftolozane/Tazobactam Day 8


   ID consulted Dr. Guevara


   recommend to continue Daptomycin and Ceftolozane/Tazobactam IV x 7 more days


   Endurance IV line placed


   Follow up with ID Clinic in [facility] c/o Dr Stone in 1 week








awaiting bed at 





[facility]


No anginal symptoms.


Continue lasix, aspirin, metoprolol, amlodipine.





ATRIAL FIBRILLATION


Continue metoprolol, Eliquis


  


PULMONARY NODULES, RIGHT MIDDLE LOBE INFILTRATE


no respiratory symptoms


per patient's daughter Rupinder, patient is following with [facility] in [facility] for this 


Pulmonary consulted, recommend outpatient follow up


discussed with Dr. Bragg


- continue follow up with [facility] 





HYPERTENSION


increased Amlodipine from 2.5 to 5mg po daily


Continue metoprolol


BP improving


monitor





PERIPHERAL VASCULAR DISEASE


Interventional Cardiology consulted


continue Pletal





DM TYPE 1


Insulin pump.


Pharmacy consulted


BSGs elevated


Novolog Sliding scale being adjusted


continue to monitor


follow recommendations of Pharmacy Glycemic control consult--> appreciate the 

recommendations





DYSLIPIDEMIA


Hold statin while receiving daptomycin.





DEMENTIA


Monitor for delirium.





MRSA


Contact precautions.





VTE PROPHYLAXIS


- on Eliquis already for A fib


 


RESUSCITATION STATUS


DNR





DISPOSITION


transition to [facility] when bed available


Follow up with Dr Moseley in 14 days from the day of surgery.  Call for an 

appointment.  


Follow up with ID Clinic in [facility] c/o Dr Stone in 1 week


Family Medicine follow-up with Dr. Mallory.


Consultants:


Ortho Dr. Moseley, ID Dr. Guevara


Procedures:


s/p Surgery by Dr. Moseley 8/7/18


   1.  Right great toe partial amputation to the level of the mid proximal


   phalanx.


   2.  Removal of fifth toe nail plate.


   3.  Irrigation and debridement of the fifth toe nail bed.


   4.  Irrigation and debridement of right heel including skin, subcutaneous


   fat, and fascia.





CT OF THE CHEST WITHOUT IV CONTRAST





CLINICAL HISTORY: Pneumonia versus lung mass.    





COMPARISON STUDY:  Chest radiograph August 6, 2018. 





CT DOSE: 207.46 mGycm





TECHNIQUE:  Axial images of the chest were obtained without IV contrast.  Images


were reviewed in the axial, sagittal, and coronal planes. IV contrast was not


administered for this examination.  A dose lowering technique was utilized


adhering to the principles of ALARA.








FINDINGS:  No enlarged axillary, mediastinal or hilar lymph nodes are present.


Heart is moderately enlarged. There are median sternotomy wires and postsurgical


findings consistent with bypass grafting. There is no pericardial effusion. No


pneumothorax or pleural effusion is noted. Lingular opacity is noted. There is


also a 2.3 x 1.4 cm nodular right middle lobe opacity shown on axial image 200


of 326. There are numerous pulmonary nodules, many of which have irregular


margins. These include a 1 cm right upper lobe nodule shown on image 117 and an


8 mm left upper lobe nodule shown on image 125. No cavitary lesions are present.


Bony thorax is unremarkable. Visualized portions of the upper abdomen


demonstrate extensive atherosclerotic calcification.





IMPRESSION:  





1. Right middle lobe and lingular opacities. The findings may reflect an


infectious etiology such as atypical mycobacterial infection. A 2.3 x 1.4 cm


nodular right middle lobe opacity is indeterminate. Although an


infectious/inflammatory process is favored, a neoplastic process could appear


similar and a follow-up chest CT in one month is recommended. Alternately,


bronchoscopy could be performed.





2. Numerous ill-defined nodules throughout the lungs. An infectious/inflammatory


process is favored however metastatic disease could appear similar. These


nodules should be assessed on follow-up chest CT.





3. No thoracic lymphadenopathy.





4. Moderate cardiomegaly and extensive coronary artery calcification.


Current Inpatient Medications:





Current Inpatient Medications








 Medications


  (Trade)  Dose


 Ordered  Sig/Amparo


 Route  Start Time


 Stop Time Status Last Admin


Dose Admin


 


 Acetaminophen


  (Tylenol Tab)  1,000 mg  Q6  PRN


 PO  8/2/18 18:00


 9/1/18 17:59  8/8/18 22:03


1,000 MG


 


 Apixaban


  (Eliquis)  5 mg  BID


 PO  8/2/18 21:00


 9/1/18 20:59 Future hold 8/11/18 08:48


5 MG


 


 Aspirin


  (Ecotrin Tab)  81 mg  DAILY


 PO  8/3/18 09:00


 9/2/18 08:59 Future hold 8/11/18 08:48


81 MG


 


 Cilostazol


  (Pletal Tab)  100 mg  BID


 PO  8/2/18 21:00


 9/1/18 20:59 Future hold 8/11/18 08:48


100 MG


 


 Citalopram


 Hydrobromide


  (celeXA TAB)  20 mg  DAILY


 PO  8/3/18 09:00


 9/2/18 08:59  8/11/18 08:50


20 MG


 


 Docusate Sodium


  (coLACE CAP)  100 mg  BID


 PO  8/2/18 21:00


 9/1/18 20:59  8/11/18 08:49


100 MG


 


 Donepezil HCl


  (Aricept Tab)  10 mg  DAILY


 PO  8/3/18 09:00


 9/2/18 08:59  8/11/18 08:49


10 MG


 


 Levothyroxine


 Sodium


  (Synthroid Tab)  88 mcg  DAILYBB


 PO  8/3/18 06:00


 9/2/18 06:59  8/11/18 05:33


88 MCG


 


 Memantine


  (Namenda Tab)  10 mg  BID


 PO  8/2/18 21:00


 9/1/18 20:59  8/11/18 08:48


10 MG


 


 Multivitamins


  (Multivitamin


 Tab)  1 tab  DAILY


 PO  8/3/18 09:00


 9/2/18 08:59  8/11/18 08:50


1 TAB


 


 Pantoprazole


 Sodium


  (Protonix Tab)  40 mg  DAILY


 PO  8/3/18 09:00


 9/2/18 08:59  8/11/18 08:50


40 MG


 


 Metoprolol


 Tartrate


  (Lopressor Tab)  25 mg  BID


 PO  8/2/18 21:00


 9/1/18 20:59  8/11/18 08:51


25 MG


 


 Mirtazapine


  (Remeron Tab)  7.5 mg  HS


 PO  8/2/18 21:00


 9/1/18 20:59  8/10/18 22:00


7.5 MG


 


 Calcium/Vitamin D


  (Caltrate Plus


 Tab)  1 tab  DAILY


 PO  8/3/18 09:00


 9/2/18 08:59  8/11/18 08:50


1 TAB


 


 Polyethylene


  (Miralax Powder


 Packet)  17 gm  DAILY


 PO  8/3/18 09:00


 9/2/18 08:59 Future hold 8/11/18 08:49


17 GM


 


 Miscellaneous


 Information


  (Consult


 Glycemic


 Management


 Pharmacy)  1 ea  UD  PRN


 N/A  8/2/18 19:42


 9/1/18 19:41   


 


 


 Daptomycin


  (Consult)  1 ea  UD  PRN


 N/A  8/2/18 20:15


 9/1/18 20:14   


 


 


 Daptomycin 400 mg/


 Syringe  8 ml @ 4


 mls/min  Q24H


 IV  8/2/18 22:00


 9/13/18 21:59  8/10/18 21:59


4 MLS/MIN


 


 Insulin Aspart


  (novoLOG INSULIN


 PUMP)  1 ea  ACHS


 N/A  8/2/18 21:00


 9/1/18 20:59 Future hold 8/11/18 12:00


1 EA


 


 Insulin Aspart


  (novoLOG ASPART)  SLIDING


 SCALE  PRN  PRN


 SC  8/2/18 20:45


 9/1/18 20:44 Future hold  


 


 


 Glucose


  (Glucose 40% Gel)  15-30


 GRAMS 15


 GRAMS...  UD  PRN


 PO  8/2/18 20:45


 9/1/18 20:44   


 


 


 Glucose


  (Glucose Chew


 Tab)  4-8


 Tablets 4


 Tabl...  UD  PRN


 PO  8/2/18 20:45


 9/1/18 20:44   


 


 


 Glucagon


  (Glucagon Inj)  1 mg  UD  PRN


 IM  8/2/18 20:45


 9/1/18 20:44   


 


 


 Dextrose


  (Dextrose 50%


 50ML Syringe)  25-50ML


 25ML FOR


 ...  UD  PRN


 IV  8/2/18 20:45


 9/1/18 20:44   


 


 


 Carbohydrates


  (Carbohydrates


 For Hypoglycemia)  15-30 GRAMS


 15 grams if


 BSG 54-69...  UD  PRN


 PO  8/2/18 20:45


 9/1/18 20:44  8/5/18 04:23


15 GM


 


 Ceftolozane/


 Tazobactam 1.5 gm/


 Dextrose  111.4 ml @ 


 111.4 mls/


 hr  Q8H


 IV  8/3/18 16:00


 8/13/18 15:59  8/11/18 07:57


111.4 MLS/HR


 


 Diphenhydramine


 HCl


  (Benadryl Cap)  25 mg  BID  PRN


 PO  8/3/18 16:30


 9/2/18 16:29   


 


 


 Insulin Aspart


  (novoLOG ASPART)  **SLIDING


 SCALE**


 **G...  AC


 SC  8/6/18 18:00


 9/5/18 17:59  8/11/18 12:57


9 UNITS


 


 Sodium Chloride  1,000 ml @ 


 15 mls/hr  Q24H


 IV  8/7/18 17:00


 9/6/18 16:59   


 


 


 Morphine Sulfate


  (MoRPHine


 SULFATE INJ)  3 mg  Q3HWA  PRN


 IV  8/8/18 00:00


 8/22/18 00:00  8/8/18 23:43


3 MG


 


 Tramadol HCl


  (Ultram Tab)  50 mg  Q6H  PRN


 PO  8/8/18 08:00


 9/7/18 07:59  8/9/18 21:36


50 MG


 


 Amlodipine


 Besylate


  (Norvasc Tab)  5 mg  DAILY


 PO  8/9/18 09:00


 9/2/18 08:59  8/11/18 08:47


5 MG


 


 Furosemide


  (Lasix Tab)  40 mg  QAM


 PO  8/9/18 09:00


 9/8/18 08:59  8/11/18 08:51


40 MG


 


 Insulin Aspart


  (novoLOG ASPART)  **SLIDING


 SCALE**


 **G...  0200  ONCE


 SC  8/12/18 02:00


 8/12/18 02:01

## 2018-08-12 VITALS — HEART RATE: 69 BPM | DIASTOLIC BLOOD PRESSURE: 55 MMHG | SYSTOLIC BLOOD PRESSURE: 135 MMHG

## 2018-08-12 VITALS
SYSTOLIC BLOOD PRESSURE: 163 MMHG | DIASTOLIC BLOOD PRESSURE: 55 MMHG | TEMPERATURE: 97.88 F | HEART RATE: 60 BPM | OXYGEN SATURATION: 93 %

## 2018-08-12 VITALS
OXYGEN SATURATION: 95 % | SYSTOLIC BLOOD PRESSURE: 125 MMHG | DIASTOLIC BLOOD PRESSURE: 66 MMHG | HEART RATE: 58 BPM | TEMPERATURE: 98.06 F

## 2018-08-12 VITALS
SYSTOLIC BLOOD PRESSURE: 144 MMHG | DIASTOLIC BLOOD PRESSURE: 54 MMHG | HEART RATE: 65 BPM | OXYGEN SATURATION: 95 % | TEMPERATURE: 98.06 F

## 2018-08-12 LAB — CREAT SERPL-MCNC: 0.66 MG/DL (ref 0.6–1.2)

## 2018-08-12 RX ADMIN — MEMANTINE HYDROCHLORIDE SCH MG: 10 TABLET ORAL at 21:19

## 2018-08-12 RX ADMIN — FUROSEMIDE SCH MG: 40 TABLET ORAL at 09:09

## 2018-08-12 RX ADMIN — INSULIN ASPART SCH UNITS: 100 INJECTION, SOLUTION INTRAVENOUS; SUBCUTANEOUS at 12:50

## 2018-08-12 RX ADMIN — INSULIN ASPART SCH UNITS: 100 INJECTION, SOLUTION INTRAVENOUS; SUBCUTANEOUS at 09:17

## 2018-08-12 RX ADMIN — DONEPEZIL HYDROCHLORIDE SCH MG: 10 TABLET, FILM COATED ORAL at 09:08

## 2018-08-12 RX ADMIN — INSULIN ASPART PRN UNITS: 100 INJECTION, SOLUTION INTRAVENOUS; SUBCUTANEOUS at 21:55

## 2018-08-12 RX ADMIN — Medication SCH TAB: at 09:10

## 2018-08-12 RX ADMIN — CEFTOLOZANE AND TAZOBACTAM SCH MLS/HR: 1; .5 INJECTION, POWDER, LYOPHILIZED, FOR SOLUTION INTRAVENOUS at 00:24

## 2018-08-12 RX ADMIN — INSULIN ASPART SCH EA: 100 INJECTION, SOLUTION INTRAVENOUS; SUBCUTANEOUS at 17:15

## 2018-08-12 RX ADMIN — Medication SCH GM: at 09:08

## 2018-08-12 RX ADMIN — Medication SCH MG: at 09:10

## 2018-08-12 RX ADMIN — METOPROLOL TARTRATE SCH MG: 25 TABLET, FILM COATED ORAL at 21:19

## 2018-08-12 RX ADMIN — DOCUSATE SODIUM SCH MG: 100 CAPSULE, LIQUID FILLED ORAL at 21:19

## 2018-08-12 RX ADMIN — INSULIN ASPART SCH EA: 100 INJECTION, SOLUTION INTRAVENOUS; SUBCUTANEOUS at 08:07

## 2018-08-12 RX ADMIN — CEFTOLOZANE AND TAZOBACTAM SCH MLS/HR: 1; .5 INJECTION, POWDER, LYOPHILIZED, FOR SOLUTION INTRAVENOUS at 08:07

## 2018-08-12 RX ADMIN — LEVOTHYROXINE SODIUM SCH MCG: 88 TABLET ORAL at 05:40

## 2018-08-12 RX ADMIN — PANTOPRAZOLE SCH MG: 40 TABLET, DELAYED RELEASE ORAL at 09:11

## 2018-08-12 RX ADMIN — CILOSTAZOL SCH MG: 100 TABLET ORAL at 09:09

## 2018-08-12 RX ADMIN — CILOSTAZOL SCH MG: 100 TABLET ORAL at 21:20

## 2018-08-12 RX ADMIN — DAPTOMYCIN SCH MLS/MIN: 50 INJECTION, POWDER, LYOPHILIZED, FOR SOLUTION INTRAVENOUS at 21:36

## 2018-08-12 RX ADMIN — APIXABAN SCH MG: 5 TABLET, FILM COATED ORAL at 09:10

## 2018-08-12 RX ADMIN — CEFTOLOZANE AND TAZOBACTAM SCH MLS/HR: 1; .5 INJECTION, POWDER, LYOPHILIZED, FOR SOLUTION INTRAVENOUS at 15:50

## 2018-08-12 RX ADMIN — DOCUSATE SODIUM SCH MG: 100 CAPSULE, LIQUID FILLED ORAL at 09:08

## 2018-08-12 RX ADMIN — METOPROLOL TARTRATE SCH MG: 25 TABLET, FILM COATED ORAL at 09:09

## 2018-08-12 RX ADMIN — INSULIN ASPART SCH EA: 100 INJECTION, SOLUTION INTRAVENOUS; SUBCUTANEOUS at 12:12

## 2018-08-12 RX ADMIN — CITALOPRAM HYDROBROMIDE SCH MG: 20 TABLET ORAL at 09:11

## 2018-08-12 RX ADMIN — APIXABAN SCH MG: 5 TABLET, FILM COATED ORAL at 21:21

## 2018-08-12 RX ADMIN — CEFTOLOZANE AND TAZOBACTAM SCH MLS/HR: 1; .5 INJECTION, POWDER, LYOPHILIZED, FOR SOLUTION INTRAVENOUS at 23:59

## 2018-08-12 RX ADMIN — MEMANTINE HYDROCHLORIDE SCH MG: 10 TABLET ORAL at 09:07

## 2018-08-12 RX ADMIN — MIRTAZAPINE SCH MG: 15 TABLET, FILM COATED ORAL at 21:20

## 2018-08-12 RX ADMIN — INSULIN ASPART SCH UNITS: 100 INJECTION, SOLUTION INTRAVENOUS; SUBCUTANEOUS at 17:59

## 2018-08-12 RX ADMIN — AMLODIPINE BESYLATE SCH MG: 5 TABLET ORAL at 09:07

## 2018-08-12 RX ADMIN — Medication SCH TAB: at 09:08

## 2018-08-12 RX ADMIN — INSULIN ASPART SCH EA: 100 INJECTION, SOLUTION INTRAVENOUS; SUBCUTANEOUS at 21:17

## 2018-08-12 NOTE — PHARMACY PROGRESS NOTE
Pharmacy Glycemic Short Note 2


Date of Service


Aug 12, 2018.





OUTPATIENT ANTIDIABETIC REGIMEN: 


* NovoLog SQ insulin pump


 * Basal Rate = 0.525 units/hr


 * Bolus per parameters:


 * Goal range = 110-150 mg/dl


 * CF = 60 mg/dl/unit


 * CR = 1 unit for every 18g CHO consumed


* HbA1c:  9.4%  (8/2/18)

















Test


  8/11/18


12:08 8/11/18


14:43 8/11/18


17:14 8/11/18


20:27


 


Bedside Glucose


  290 mg/dl


(70-90) 264 mg/dl


(70-90) 115 mg/dl


(70-90) 192 mg/dl


(70-90)


 


Test


  8/12/18


01:59 8/12/18


08:09 


  


 


 


Bedside Glucose


  119 mg/dl


(70-90) 115 mg/dl


(70-90) 


  


 














ASSESSMENT:





8/12/18


* Ms. Reyes received a total of 37.6 units of SQ insulin yesterday (same as day 

prior) + 3 units of IV


* BSGs are tremendously improved today.  Suspect that she just needed tighter 

bolus parameters with the current infection.


* Will not plan to make any changes today





8/11/18


* Ms. Reyes received 25 units of bolus insulin yesterday, in addition to her 

basal being given through her pump (total of 37.6 units)


* BSGs were improved yesterday afternoon but are elevated again this AM


* It seems unlikely there is an issue with her pump - site was just changed, 

etc yesterday by CDE


* BSG elevation could just be secondary to infection.  Will plan to tighten 

Novolog parameters but keep basal the same since I don't want to change pump 

settings





8/10/18


* Ms Reyes is POD #3 s/p toe amputation.


* Patient has been utilizing her insulin pump to provide basal insulin only.  

BSGs have fluctuated (which is normal for her) but have been relatively stable 

the past few days.


* Patient has become increasingly hyperglycemic since last evening ( --> 

259 --> 204 --> 324 --> 431).


* Phone call placed to RN this morning to confirm that insulin pump is still 

running and delivering basal insulin.  Per RN, everything looks okay with the 

pump.


 * CDE to assist patient with changing pump site and refill pump this morning.  

Will see if that helps.


 * Pump site changed and canister filled at lunchtime today.  


 * Pre-lunch :  5 units IV insulin administered


* RN notes that patient has been asking for snacks/soda and was found to be 

eating some crackers in her room at one point.  Perhaps patient is snacking and 

not notifying nursing staff?  This may account for some of her hyperglycemia?


* Expected discharge to [facility] soon.





 





PLAN FOR INPATIENT GLYCEMIC CONTROL: 


* Basal insulin: no change 


 * Per SQ insulin pump outpatient settings





* Bolus insulin: No change; nursing to administer SQ NovoLog for meal time 

coverage since patient cannot bolus herself from pump.


 * NovoLog per scale AC or Q6hrs while NPO


 * Goal Range:  Low 100 mg/dL - High 160 mg/dL


 * Correction Factor:  30 mg/dL/unit


 * Nutritional / Prandial insulin per carb ratio of  1 unit per 9 grams CHO 

consumed


      *** NO Novolog coverage at HS, as this tends to make patient hypoglycemic 

in the morning  (BSG should still be checked) ***








DISCHARGE PLANNING: 





Updated 8/12:


* Patient's A1c (9.4%) indicates slightly suboptimal glycemic control.


* Expect that patient may resume home pump settings on discharge, as long as 

she will be able to operate her pump effectively.


 * Patient's daughter generally manages her pump.  Unsure that pump will be 

manageable at [facility]?


* Expect that patient's basal rate is reasonable, but that she may require 

slightly tighter CF/CR parameters.  


* It does sound like patient experiences episodes of hypoglycemia at home (

mainly in the morning), so would continue to omit HS coverage.


-----------------------


* If patient is able to continue pump at [facility]:


 * Continue insulin pump with basal rate only


 * Nursing to administer bolus insulin - 


 * Novolog per sliding scale using CF of 30 mg/dL


 * Novolog 5 units with each meal (equivalent to CR ~ 1 unit per 9 gm CHO)





* If patient is NOT able to continue pump at [facility], consider:


 * Lantus 12 units SQ qAM


 * Novolog per sliding scale using CF of 30 mg/dL


 * Novolog 5 units with each meal (equivalent to CR ~ 1 unit per 9 gm CHO)





**Please note: The above Novolog parameters are more aggressive than her 

outpatient pump settings because requirements have been more due to current 

infection.  These will need loosened over time.

## 2018-08-12 NOTE — PROGRESS NOTE
Medicine Progress Note


Date & Time of Visit:


Aug 12, 2018 at 13:50.


Subjective


seen resting in bed, comfortable


denies any complaints 


states she feels fine


no other symptoms





Objective





Last 8 Hrs








  Date Time  Temp Pulse Resp B/P (MAP) Pulse Ox O2 Delivery O2 Flow Rate FiO2


 


8/12/18 08:00      Room Air  


 


8/12/18 07:15 36.6 60 18 163/55 (91) 93 Room Air  








Physical Exam:





General- oriented x 2, not in distress, speaks in sentences with no effort





Eyes- anicteric





Lungs- clear BS bilaterally, no rales/wheezes





Heart- normal rate, regular rhythm; no murmur





Abdomen- normal bowel sounds, soft, nontender





Extremities- right lower leg: dressing in place with drain


                 left: no pretibial edema, no calf tenderness





Neuro- alert, oriented x 2


          no gross focal neuro deficits





Skin- warm & dry


Laboratory Results:





Last 24 Hours








Test


  8/11/18


14:43 8/11/18


17:14 8/11/18


20:27 8/12/18


01:59


 


Bedside Glucose 264 mg/dl  115 mg/dl  192 mg/dl  119 mg/dl 


 


Test


  8/12/18


07:04 8/12/18


08:09 8/12/18


11:56 


 


 


Creatinine 0.66 mg/dl    


 


Est Creatinine Clear Calc


Drug Dose 69.8 ml/min 


  


  


  


 


 


Estimated GFR (


American) 100.2 


  


  


  


 


 


Estimated GFR (Non-


American 86.4 


  


  


  


 


 


Bedside Glucose  115 mg/dl  258 mg/dl  











Assessment & Plan





CELLULITIS RIGHT FOOT / GANGRENE RIGHT GREAT TOE, 5TH TOE


s/p Surgery by Dr. Moseley 8/7/18


   1.  Right great toe partial amputation to the level of the mid proximal


   phalanx.


   2.  Removal of fifth toe nail plate.


   3.  Irrigation and debridement of the fifth toe nail bed.


   4.  Irrigation and debridement of right heel including skin, subcutaneous


   fat, and fascia.





doing well post op


cleared for discharge by Ortho





Per Ortho:


   Daily dressing changes to the right foot.  Keep an eye on her heel as well. (

breakdown?)


   Weightbearing on the heel only with protective shoe.


   Ok to shower if you keep the dressing covered with a waterproof covering.


   Call if you have increased temp of 101.5 or greater, increased redness, 

increased swelling or drainage.  [phone]


   Follow up with Dr Moseley in 14 days from the day of surgery.  Call for an 

appointment.  








Outpatient cultures grew MRSA and Pseudomonas.


   on Dapto + Ceftolozane/Tazobactam Day 10


   ID consulted Dr. Guevara


   recommend to continue Daptomycin and Ceftolozane/Tazobactam IV x 5 more days


   Endurance IV line placed


   Follow up with ID Clinic in [facility] c/o Dr Stone in 1 week








awaiting bed at 





[facility]


No anginal symptoms.


Continue lasix, aspirin, metoprolol, amlodipine.





ATRIAL FIBRILLATION


Continue metoprolol, Eliquis


  


PULMONARY NODULES, RIGHT MIDDLE LOBE INFILTRATE


no respiratory symptoms


per patient's daughter Rupinder, patient is following with [facility] in [facility][1] for this 


Pulmonary consulted, recommend outpatient follow up


discussed with Dr. Bragg


- continue follow up with Pulmonary [facility] 





HYPERTENSION


increased Amlodipine from 2.5 to 5mg po daily


Continue metoprolol


monitor





PERIPHERAL VASCULAR DISEASE


Interventional Cardiology consulted


continue Pletal





DM TYPE 1


Insulin pump.


Pharmacy consulted


BSGs elevated


Novolog Sliding scale being adjusted


continue to monitor


follow recommendations of Pharmacy Glycemic control consult--> appreciate the 

recommendations





DYSLIPIDEMIA


Hold statin while receiving daptomycin.





DEMENTIA


Monitor for delirium.





MRSA


Contact precautions.





VTE PROPHYLAXIS


- on Eliquis already for A fib


 


RESUSCITATION STATUS


DNR





DISPOSITION


transition to [facility] when bed available


Follow up with Dr Moseley in 14 days from the day of surgery.  Call for an 

appointment.  


Follow up with ID Clinic in [facility] c/o Dr Stone in 1 week


Family Medicine follow-up with Dr. Mallory.


Consultants:


Ortho Dr. Moseley, ID Dr. Guevara


Procedures:


s/p Surgery by Dr. Moseley 8/7/18


   1.  Right great toe partial amputation to the level of the mid proximal


   phalanx.


   2.  Removal of fifth toe nail plate.


   3.  Irrigation and debridement of the fifth toe nail bed.


   4.  Irrigation and debridement of right heel including skin, subcutaneous


   fat, and fascia.





CT OF THE CHEST WITHOUT IV CONTRAST





CLINICAL HISTORY: Pneumonia versus lung mass.    





COMPARISON STUDY:  Chest radiograph August 6, 2018. 





CT DOSE: 207.46 mGycm





TECHNIQUE:  Axial images of the chest were obtained without IV contrast.  Images


were reviewed in the axial, sagittal, and coronal planes. IV contrast was not


administered for this examination.  A dose lowering technique was utilized


adhering to the principles of ALARA.








FINDINGS:  No enlarged axillary, mediastinal or hilar lymph nodes are present.


Heart is moderately enlarged. There are median sternotomy wires and postsurgical


findings consistent with bypass grafting. There is no pericardial effusion. No


pneumothorax or pleural effusion is noted. Lingular opacity is noted. There is


also a 2.3 x 1.4 cm nodular right middle lobe opacity shown on axial image 200


of 326. There are numerous pulmonary nodules, many of which have irregular


margins. These include a 1 cm right upper lobe nodule shown on image 117 and an


8 mm left upper lobe nodule shown on image 125. No cavitary lesions are present.


Bony thorax is unremarkable. Visualized portions of the upper abdomen


demonstrate extensive atherosclerotic calcification.





IMPRESSION:  





1. Right middle lobe and lingular opacities. The findings may reflect an


infectious etiology such as atypical mycobacterial infection. A 2.3 x 1.4 cm


nodular right middle lobe opacity is indeterminate. Although an


infectious/inflammatory process is favored, a neoplastic process could appear


similar and a follow-up chest CT in one month is recommended. Alternately,


bronchoscopy could be performed.





2. Numerous ill-defined nodules throughout the lungs. An infectious/inflammatory


process is favored however metastatic disease could appear similar. These


nodules should be assessed on follow-up chest CT.





3. No thoracic lymphadenopathy.





4. Moderate cardiomegaly and extensive coronary artery calcification.


Current Inpatient Medications:





Current Inpatient Medications








 Medications


  (Trade)  Dose


 Ordered  Sig/Amparo


 Route  Start Time


 Stop Time Status Last Admin


Dose Admin


 


 Acetaminophen


  (Tylenol Tab)  1,000 mg  Q6  PRN


 PO  8/2/18 18:00


 9/1/18 17:59  8/8/18 22:03


1,000 MG


 


 Apixaban


  (Eliquis)  5 mg  BID


 PO  8/2/18 21:00


 9/1/18 20:59 Future hold 8/12/18 09:10


5 MG


 


 Aspirin


  (Ecotrin Tab)  81 mg  DAILY


 PO  8/3/18 09:00


 9/2/18 08:59 Future hold 8/12/18 09:10


81 MG


 


 Cilostazol


  (Pletal Tab)  100 mg  BID


 PO  8/2/18 21:00


 9/1/18 20:59 Future hold 8/12/18 09:09


100 MG


 


 Citalopram


 Hydrobromide


  (celeXA TAB)  20 mg  DAILY


 PO  8/3/18 09:00


 9/2/18 08:59  8/12/18 09:11


20 MG


 


 Docusate Sodium


  (coLACE CAP)  100 mg  BID


 PO  8/2/18 21:00


 9/1/18 20:59  8/12/18 09:08


100 MG


 


 Donepezil HCl


  (Aricept Tab)  10 mg  DAILY


 PO  8/3/18 09:00


 9/2/18 08:59  8/12/18 09:08


10 MG


 


 Levothyroxine


 Sodium


  (Synthroid Tab)  88 mcg  DAILYBB


 PO  8/3/18 06:00


 9/2/18 06:59  8/12/18 05:40


88 MCG


 


 Memantine


  (Namenda Tab)  10 mg  BID


 PO  8/2/18 21:00


 9/1/18 20:59  8/12/18 09:07


10 MG


 


 Multivitamins


  (Multivitamin


 Tab)  1 tab  DAILY


 PO  8/3/18 09:00


 9/2/18 08:59  8/12/18 09:08


1 TAB


 


 Pantoprazole


 Sodium


  (Protonix Tab)  40 mg  DAILY


 PO  8/3/18 09:00


 9/2/18 08:59  8/12/18 09:11


40 MG


 


 Metoprolol


 Tartrate


  (Lopressor Tab)  25 mg  BID


 PO  8/2/18 21:00


 9/1/18 20:59  8/12/18 09:09


25 MG


 


 Mirtazapine


  (Remeron Tab)  7.5 mg  HS


 PO  8/2/18 21:00


 9/1/18 20:59  8/11/18 21:00


7.5 MG


 


 Calcium/Vitamin D


  (Caltrate Plus


 Tab)  1 tab  DAILY


 PO  8/3/18 09:00


 9/2/18 08:59  8/12/18 09:10


1 TAB


 


 Polyethylene


  (Miralax Powder


 Packet)  17 gm  DAILY


 PO  8/3/18 09:00


 9/2/18 08:59 Future hold 8/12/18 09:08


17 GM


 


 Miscellaneous


 Information


  (Consult


 Glycemic


 Management


 Pharmacy)  1 ea  UD  PRN


 N/A  8/2/18 19:42


 9/1/18 19:41   


 


 


 Daptomycin


  (Consult)  1 ea  UD  PRN


 N/A  8/2/18 20:15


 9/1/18 20:14   


 


 


 Daptomycin 400 mg/


 Syringe  8 ml @ 4


 mls/min  Q24H


 IV  8/2/18 22:00


 9/13/18 21:59  8/11/18 21:16


4 MLS/MIN


 


 Insulin Aspart


  (novoLOG INSULIN


 PUMP)  1 ea  ACHS


 N/A  8/2/18 21:00


 9/1/18 20:59 Future hold 8/12/18 12:12


1 EA


 


 Insulin Aspart


  (novoLOG ASPART)  SLIDING


 SCALE  PRN  PRN


 SC  8/2/18 20:45


 9/1/18 20:44 Future hold  


 


 


 Glucose


  (Glucose 40% Gel)  15-30


 GRAMS 15


 GRAMS...  UD  PRN


 PO  8/2/18 20:45


 9/1/18 20:44   


 


 


 Glucose


  (Glucose Chew


 Tab)  4-8


 Tablets 4


 Tabl...  UD  PRN


 PO  8/2/18 20:45


 9/1/18 20:44   


 


 


 Glucagon


  (Glucagon Inj)  1 mg  UD  PRN


 IM  8/2/18 20:45


 9/1/18 20:44   


 


 


 Dextrose


  (Dextrose 50%


 50ML Syringe)  25-50ML


 25ML FOR


 ...  UD  PRN


 IV  8/2/18 20:45


 9/1/18 20:44   


 


 


 Carbohydrates


  (Carbohydrates


 For Hypoglycemia)  15-30 GRAMS


 15 grams if


 BSG 54-69...  UD  PRN


 PO  8/2/18 20:45


 9/1/18 20:44  8/5/18 04:23


15 GM


 


 Ceftolozane/


 Tazobactam 1.5 gm/


 Dextrose  111.4 ml @ 


 111.4 mls/


 hr  Q8H


 IV  8/3/18 16:00


 8/13/18 15:59  8/12/18 08:07


111.4 MLS/HR


 


 Diphenhydramine


 HCl


  (Benadryl Cap)  25 mg  BID  PRN


 PO  8/3/18 16:30


 9/2/18 16:29   


 


 


 Insulin Aspart


  (novoLOG ASPART)  **SLIDING


 SCALE**


 **G...  AC


 SC  8/6/18 18:00


 9/5/18 17:59  8/12/18 12:50


10 UNITS


 


 Morphine Sulfate


  (MoRPHine


 SULFATE INJ)  3 mg  Q3HWA  PRN


 IV  8/8/18 00:00


 8/22/18 00:00  8/8/18 23:43


3 MG


 


 Tramadol HCl


  (Ultram Tab)  50 mg  Q6H  PRN


 PO  8/8/18 08:00


 9/7/18 07:59  8/9/18 21:36


50 MG


 


 Amlodipine


 Besylate


  (Norvasc Tab)  5 mg  DAILY


 PO  8/9/18 09:00


 9/2/18 08:59  8/12/18 09:07


5 MG


 


 Furosemide


  (Lasix Tab)  40 mg  QAM


 PO  8/9/18 09:00


 9/8/18 08:59  8/12/18 09:09


40 MG

## 2018-08-13 VITALS — SYSTOLIC BLOOD PRESSURE: 119 MMHG | DIASTOLIC BLOOD PRESSURE: 64 MMHG | HEART RATE: 113 BPM

## 2018-08-13 VITALS
HEART RATE: 116 BPM | SYSTOLIC BLOOD PRESSURE: 92 MMHG | DIASTOLIC BLOOD PRESSURE: 58 MMHG | OXYGEN SATURATION: 96 % | TEMPERATURE: 97.88 F

## 2018-08-13 VITALS — HEART RATE: 109 BPM | SYSTOLIC BLOOD PRESSURE: 129 MMHG | DIASTOLIC BLOOD PRESSURE: 74 MMHG

## 2018-08-13 VITALS
TEMPERATURE: 97.52 F | OXYGEN SATURATION: 96 % | HEART RATE: 104 BPM | DIASTOLIC BLOOD PRESSURE: 64 MMHG | SYSTOLIC BLOOD PRESSURE: 110 MMHG

## 2018-08-13 VITALS
TEMPERATURE: 97.88 F | OXYGEN SATURATION: 96 % | SYSTOLIC BLOOD PRESSURE: 169 MMHG | DIASTOLIC BLOOD PRESSURE: 75 MMHG | HEART RATE: 94 BPM

## 2018-08-13 LAB
BASOPHILS # BLD: 0.05 K/UL (ref 0–0.2)
BASOPHILS NFR BLD: 0.9 %
BUN SERPL-MCNC: 28 MG/DL (ref 7–18)
BUN SERPL-MCNC: 39 MG/DL (ref 7–18)
CALCIUM SERPL-MCNC: 8.9 MG/DL (ref 8.5–10.1)
CALCIUM SERPL-MCNC: 9.7 MG/DL (ref 8.5–10.1)
CO2 SERPL-SCNC: 28 MMOL/L (ref 21–32)
CO2 SERPL-SCNC: 31 MMOL/L (ref 21–32)
CREAT SERPL-MCNC: 0.86 MG/DL (ref 0.6–1.2)
CREAT SERPL-MCNC: 1.64 MG/DL (ref 0.6–1.2)
EOS ABS #: 0.13 K/UL (ref 0–0.5)
EOSINOPHIL NFR BLD AUTO: 306 K/UL (ref 130–400)
GLUCOSE SERPL-MCNC: 184 MG/DL (ref 70–99)
GLUCOSE SERPL-MCNC: 272 MG/DL (ref 70–99)
HCT VFR BLD CALC: 30.3 % (ref 37–47)
HGB BLD-MCNC: 9.6 G/DL (ref 12–16)
IG#: 0.02 K/UL (ref 0–0.02)
IMM GRANULOCYTES NFR BLD AUTO: 29.6 %
LYMPHOCYTES # BLD: 1.68 K/UL (ref 1.2–3.4)
MCH RBC QN AUTO: 29.1 PG (ref 25–34)
MCHC RBC AUTO-ENTMCNC: 31.7 G/DL (ref 32–36)
MCV RBC AUTO: 91.8 FL (ref 80–100)
MONO ABS #: 0.72 K/UL (ref 0.11–0.59)
MONOCYTES NFR BLD: 12.7 %
NEUT ABS #: 3.07 K/UL (ref 1.4–6.5)
NEUTROPHILS # BLD AUTO: 2.3 %
NEUTROPHILS NFR BLD AUTO: 54.1 %
PMV BLD AUTO: 9.7 FL (ref 7.4–10.4)
POTASSIUM SERPL-SCNC: 2.9 MMOL/L (ref 3.5–5.1)
POTASSIUM SERPL-SCNC: 5.2 MMOL/L (ref 3.5–5.1)
RED CELL DISTRIBUTION WIDTH CV: 17.5 % (ref 11.5–14.5)
RED CELL DISTRIBUTION WIDTH SD: 58.8 FL (ref 36.4–46.3)
SODIUM SERPL-SCNC: 133 MMOL/L (ref 136–145)
SODIUM SERPL-SCNC: 139 MMOL/L (ref 136–145)
WBC # BLD AUTO: 5.67 K/UL (ref 4.8–10.8)

## 2018-08-13 RX ADMIN — DONEPEZIL HYDROCHLORIDE SCH MG: 10 TABLET, FILM COATED ORAL at 08:59

## 2018-08-13 RX ADMIN — Medication SCH TAB: at 09:00

## 2018-08-13 RX ADMIN — MEMANTINE HYDROCHLORIDE SCH MG: 10 TABLET ORAL at 21:55

## 2018-08-13 RX ADMIN — Medication SCH MG: at 21:56

## 2018-08-13 RX ADMIN — LEVOTHYROXINE SODIUM SCH MCG: 88 TABLET ORAL at 06:15

## 2018-08-13 RX ADMIN — FUROSEMIDE SCH MG: 40 TABLET ORAL at 09:00

## 2018-08-13 RX ADMIN — CEFTOLOZANE AND TAZOBACTAM SCH MLS/HR: 1; .5 INJECTION, POWDER, LYOPHILIZED, FOR SOLUTION INTRAVENOUS at 07:46

## 2018-08-13 RX ADMIN — INSULIN ASPART SCH UNITS: 100 INJECTION, SOLUTION INTRAVENOUS; SUBCUTANEOUS at 18:06

## 2018-08-13 RX ADMIN — INSULIN ASPART SCH UNITS: 100 INJECTION, SOLUTION INTRAVENOUS; SUBCUTANEOUS at 12:49

## 2018-08-13 RX ADMIN — DOCUSATE SODIUM SCH MG: 100 CAPSULE, LIQUID FILLED ORAL at 09:00

## 2018-08-13 RX ADMIN — APIXABAN SCH MG: 5 TABLET, FILM COATED ORAL at 08:59

## 2018-08-13 RX ADMIN — POTASSIUM CHLORIDE SCH MLS/HR: 10 INJECTION, SOLUTION INTRAVENOUS at 06:15

## 2018-08-13 RX ADMIN — INSULIN ASPART SCH EA: 100 INJECTION, SOLUTION INTRAVENOUS; SUBCUTANEOUS at 21:00

## 2018-08-13 RX ADMIN — Medication SCH MG: at 09:17

## 2018-08-13 RX ADMIN — METOPROLOL TARTRATE SCH MG: 25 TABLET, FILM COATED ORAL at 21:56

## 2018-08-13 RX ADMIN — CITALOPRAM HYDROBROMIDE SCH MG: 20 TABLET ORAL at 08:59

## 2018-08-13 RX ADMIN — APIXABAN SCH MG: 5 TABLET, FILM COATED ORAL at 21:56

## 2018-08-13 RX ADMIN — DOCUSATE SODIUM SCH MG: 100 CAPSULE, LIQUID FILLED ORAL at 21:56

## 2018-08-13 RX ADMIN — POTASSIUM CHLORIDE SCH MLS/HR: 10 INJECTION, SOLUTION INTRAVENOUS at 07:46

## 2018-08-13 RX ADMIN — CILOSTAZOL SCH MG: 100 TABLET ORAL at 09:00

## 2018-08-13 RX ADMIN — INSULIN ASPART SCH UNITS: 100 INJECTION, SOLUTION INTRAVENOUS; SUBCUTANEOUS at 09:15

## 2018-08-13 RX ADMIN — SODIUM CHLORIDE SCH MLS/HR: 900 INJECTION, SOLUTION INTRAVENOUS at 16:28

## 2018-08-13 RX ADMIN — PANTOPRAZOLE SCH MG: 40 TABLET, DELAYED RELEASE ORAL at 08:59

## 2018-08-13 RX ADMIN — POTASSIUM CHLORIDE SCH MLS/HR: 10 INJECTION, SOLUTION INTRAVENOUS at 04:49

## 2018-08-13 RX ADMIN — MEMANTINE HYDROCHLORIDE SCH MG: 10 TABLET ORAL at 09:00

## 2018-08-13 RX ADMIN — Medication SCH TAB: at 08:59

## 2018-08-13 RX ADMIN — POTASSIUM CHLORIDE SCH MLS/HR: 10 INJECTION, SOLUTION INTRAVENOUS at 03:14

## 2018-08-13 RX ADMIN — CILOSTAZOL SCH MG: 100 TABLET ORAL at 21:55

## 2018-08-13 RX ADMIN — Medication SCH GM: at 08:59

## 2018-08-13 RX ADMIN — MIRTAZAPINE SCH MG: 15 TABLET, FILM COATED ORAL at 21:56

## 2018-08-13 RX ADMIN — METOPROLOL TARTRATE SCH MG: 25 TABLET, FILM COATED ORAL at 08:59

## 2018-08-13 RX ADMIN — AMLODIPINE BESYLATE SCH MG: 5 TABLET ORAL at 09:00

## 2018-08-13 RX ADMIN — Medication SCH MG: at 09:00

## 2018-08-13 NOTE — PHARMACY PROGRESS NOTE
Pharmacy Glycemic Short Note 2


Date of Service


Aug 13, 2018.





OUTPATIENT ANTIDIABETIC REGIMEN: 


* NovoLog SQ insulin pump


 * Basal Rate = 0.525 units/hr


 * Bolus per parameters:


 * Goal range = 110-150 mg/dl


 * CF = 60 mg/dl/unit


 * CR = 1 unit for every 18g CHO consumed


* HbA1c:  9.4%  (8/2/18)











Item Value  Date Time


 


Bedside Glucose 115 mg/dl H 8/12/18 0809


 


Bedside Glucose 258 mg/dl H 8/12/18 1156


 


Bedside Glucose 184 mg/dl H 8/12/18 1458


 


Bedside Glucose 141 mg/dl H 8/12/18 1727


 


Bedside Glucose 217 mg/dl H 8/12/18 2036


 


Bedside Glucose 14 mg/dl *L 8/13/18 0004


 


Bedside Glucose 223 mg/dl H 8/13/18 0039


 


Bedside Glucose 119 mg/dl H 8/13/18 0255


 


Bedside Glucose 262 mg/dl H 8/13/18 0559








ASSESSMENT:





8/13/18


* Mrs. Reyes received a total of 37.6 units of insulin yesterday (daily 

requirement has been consistent the past 72 hours).


* Improvement in BSGs was noted throughout the day, however, patient had a 

significant symptomatic low around midnight.  Exact cause of hypoglycemia is 

unknown. Insulin pump was removed at the time. 


 * Nursing notes state that upon entering the patients room she was found 

twitching, which progressed to seizure like activity. A code purple was called 

and BSG at that time was 14 mg/dL.


 * Her BSG at HS was 217 mg/dL. She was given no bolus insulin. Last dose of 

Novolog was 8 units with dinner. 


 * Day shift RN reported that the patients insulin pump was re-filled at 2200 

last evening by nursing staff. Pump site had been previously changed and 

refilled on 8/10. CDE assisted patient in the process and confirms that there 

should have been enough insulin in the patients pump to last the weekend.


 * I reviewed pump data this morning with CDE. Although we can not 100% rule 

out pump malfunction, there was no evidence to suggest this. The pump read that 

the last time the bolus feature was used was 8/2 and the total number of units 

per day matched my calculations. 


* Based on low suspicion of pump malfunction and care-givers preference to 

continue management via insulin pump rather that basal + bolus insulin, I feel 

that once BSG is back under control her pump can be resumed. She currently has 

5 units of Lantus on board (~50% of daily basal needs). I would recommend 

waiting until next Lantus dose is due to resume pump. 


* I contacted patients daughter, Rupinder, to discuss treatment plan. Rupinder will be 

coming to the hospital at 1800 today. She will re-connect pump and resume 

patient's home basal rate. Last BSG was 384 mg/dL (after administration of 5 

units IV regular insulin bolus and tightening Novolog CR). I am hesitant to 

make further changes at this time for fear of overcorrecting her. I suspect 

that BSG elevation is due to basal deficiency. We are unable to give additional 

Lantus at this time per that would delay resuming insulin pump until tomorrow 

AM. 





PLAN FOR INPATIENT GLYCEMIC CONTROL: 





* Basal insulin 


 * SQ insulin pump is currently on hold --> patient's daughter, Rupinder, will re-

connect @ 1800


 * Lantus 5 units SQ given this morning - no further Lantus ordered





* Bolus insulin: No change; nursing to administer SQ NovoLog for meal time 

coverage since patient cannot bolus herself from pump.


 * NovoLog per scale AC or Q6hrs while NPO


 * Goal Range:  Low 100 mg/dL - High 160 mg/dL


 * Correction Factor:  30 mg/dL/unit


 * Nutritional / Prandial insulin per carb ratio of  1 unit per 7 grams CHO 

consumed


      *** NO Novolog coverage at HS, as this tends to make patient hypoglycemic 

in the morning  (BSG should still be checked) ***





DISCHARGE PLANNING: 





Updated 8/12:


* Patient's A1c (9.4%) indicates slightly suboptimal glycemic control.


* Expect that patient may resume home pump settings on discharge, as long as 

she will be able to operate her pump effectively.


 * Patient's daughter generally manages her pump.  Unsure that pump will be 

manageable at [facility]?


* Expect that patient's basal rate is reasonable, but that she may require 

slightly tighter CF/CR parameters.  


* It does sound like patient experiences episodes of hypoglycemia at home (

mainly in the morning), so would continue to omit HS coverage.


-----------------------


* If patient is able to continue pump at [facility]:


 * Continue insulin pump with basal rate only


 * Nursing to administer bolus insulin - 


 * Novolog per sliding scale using CF of 30 mg/dL


 * Novolog 5 units with each meal (equivalent to CR ~ 1 unit per 9 gm CHO)





* If patient is NOT able to continue pump at [facility], consider:


 * Lantus 12 units SQ qAM


 * Novolog per sliding scale using CF of 30 mg/dL


 * Novolog 5 units with each meal (equivalent to CR ~ 1 unit per 9 gm CHO)





**Please note: The above Novolog parameters are more aggressive than her 

outpatient pump settings because requirements have been more due to current 

infection.  These will need loosened over time.

## 2018-08-13 NOTE — PROGRESS NOTE
Internal Med Progress Note


Date of Service:


Aug 13, 2018.


Provider Documentation:





SUBJECTIVE:





Code purple.


Patient was found unresponsive, shaking and profusely sweating


Blood glucose was found to be 14


Was given two ampules of D50 after which patient became alert and oriented


feeling fine


hemodynamics stable


No complaints at this time


Removed insulin pump


started on D10@50ml/hr


will follow blood sugars closely


pharmacy consult for insulin regimen


Will notify m providers.


ASSESSMENT & PLAN:


[]





DVT PROPHYLAXIS


[]





DISPOSITION


[]


Vital Signs:











  Date Time  Temp Pulse Resp B/P (MAP) Pulse Ox O2 Delivery O2 Flow Rate FiO2


 


8/12/18 23:10 36.7 65 16 144/54 (84) 95 Room Air  


 


8/12/18 21:17  69  135/55 (81)    


 


8/12/18 15:42 36.7 58 18 125/66 (85) 95 Room Air  


 


8/12/18 15:41      Room Air  


 


8/12/18 08:00      Room Air  


 


8/12/18 07:15 36.6 60 18 163/55 (91) 93 Room Air  








Lab Results:





Results Past 24 Hours








Test


  8/12/18


01:59 8/12/18


07:04 8/12/18


08:09 8/12/18


11:56 Range/Units


 


 


Bedside Glucose 119  115 258 70-90  mg/dl


 


Creatinine


  


  0.66


  


  


  0.60-1.20


mg/dl


 


Est Creatinine Clear Calc


Drug Dose 


  69.8


  


  


   ml/min


 


 


Estimated GFR (


American) 


  100.2


  


  


   


 


 


Estimated GFR (Non-


American 


  86.4


  


  


   


 


 


Test


  8/12/18


14:58 8/12/18


17:27 8/12/18


20:36 8/13/18


00:04 Range/Units


 


 


Bedside Glucose 184 141 217 14 70-90  mg/dl

## 2018-08-13 NOTE — PROGRESS NOTE
Medicine Progress Note


Date & Time of Visit:


Aug 13, 2018 at 08:54.


Subjective


seen resting in bed, comfortable


events last night noted


states she feels fine


denies chest pain, dyspnea, headache, dizziness, abdominal pain


denies pain on the right foot


no other symptoms





Objective





Last 8 Hrs








  Date Time  Temp Pulse Resp B/P (MAP) Pulse Ox O2 Delivery O2 Flow Rate FiO2


 


8/13/18 07:30      Room Air  


 


8/13/18 07:20 36.6 94 15 169/75 (106) 96 Room Air  


 


8/13/18 03:15      Room Air  








Physical Exam:





General- oriented x 2, not in distress, speaks in sentences with no effort





Eyes- anicteric





Lungs- clear BS bilaterally





Heart- normal rate, regular rhythm; no murmur





Abdomen- normal bowel sounds, soft, nontender





Extremities- right lower leg: dressing in place removed--> sutures intact, no 

discharge/bleeding, mild erythema around surgical incision


                   


                 left: no pretibial edema, no calf tenderness





Neuro- alert, oriented x 2


          no gross focal neuro deficits





Skin- warm & dry


Laboratory Results:





Last 24 Hours








Test


  8/12/18


11:56 8/12/18


14:58 8/12/18


17:27 8/12/18


20:36


 


Bedside Glucose 258 mg/dl  184 mg/dl  141 mg/dl  217 mg/dl 


 


Test


  8/13/18


00:04 8/13/18


00:39 8/13/18


00:43 8/13/18


02:55


 


Bedside Glucose 14 mg/dl  223 mg/dl   119 mg/dl 


 


White Blood Count   5.67 K/uL  


 


Red Blood Count   3.30 M/uL  


 


Hemoglobin   9.6 g/dL  


 


Hematocrit   30.3 %  


 


Mean Corpuscular Volume   91.8 fL  


 


Mean Corpuscular Hemoglobin   29.1 pg  


 


Mean Corpuscular Hemoglobin


Concent 


  


  31.7 g/dl 


  


 


 


Platelet Count   306 K/uL  


 


Mean Platelet Volume   9.7 fL  


 


Neutrophils (%) (Auto)   54.1 %  


 


Lymphocytes (%) (Auto)   29.6 %  


 


Monocytes (%) (Auto)   12.7 %  


 


Eosinophils (%) (Auto)   2.3 %  


 


Basophils (%) (Auto)   0.9 %  


 


Neutrophils # (Auto)   3.07 K/uL  


 


Lymphocytes # (Auto)   1.68 K/uL  


 


Monocytes # (Auto)   0.72 K/uL  


 


Eosinophils # (Auto)   0.13 K/uL  


 


Basophils # (Auto)   0.05 K/uL  


 


RDW Standard Deviation   58.8 fL  


 


RDW Coefficient of Variation   17.5 %  


 


Immature Granulocyte % (Auto)   0.4 %  


 


Immature Granulocyte # (Auto)   0.02 K/uL  


 


Sodium Level   139 mmol/L  


 


Potassium Level   2.9 mmol/L  


 


Chloride Level   101 mmol/L  


 


Carbon Dioxide Level   31 mmol/L  


 


Anion Gap   7.0 mmol/L  


 


Blood Urea Nitrogen   28 mg/dl  


 


Creatinine   0.86 mg/dl  


 


Est Creatinine Clear Calc


Drug Dose 


  


  53.5 ml/min 


  


 


 


Estimated GFR (


American) 


  


  76.6 


  


 


 


Estimated GFR (Non-


American 


  


  66.1 


  


 


 


BUN/Creatinine Ratio   32.2  


 


Random Glucose   184 mg/dl  


 


Calcium Level   8.9 mg/dl  


 


Magnesium Level   1.7 mg/dl  


 


Test


  8/13/18


05:59 8/13/18


08:26 


  


 


 


Bedside Glucose 262 mg/dl  330 mg/dl   











Assessment & Plan





CELLULITIS RIGHT FOOT / GANGRENE RIGHT GREAT TOE, 5TH TOE


s/p Surgery by Dr. Moseley 8/7/18


   1.  Right great toe partial amputation to the level of the mid proximal


   phalanx.


   2.  Removal of fifth toe nail plate.


   3.  Irrigation and debridement of the fifth toe nail bed.


   4.  Irrigation and debridement of right heel including skin, subcutaneous


   fat, and fascia.





doing well post op


cleared for discharge by Ortho





Per Ortho:


   Daily dressing changes to the right foot.  Keep an eye on her heel as well. (

breakdown?)


   Weightbearing on the heel only with protective shoe.


   Ok to shower if you keep the dressing covered with a waterproof covering.


   Call if you have increased temp of 101.5 or greater, increased redness, 

increased swelling or drainage.  


   Follow up with Dr Moseley in 14 days from the day of surgery.  Call for an 

appointment.  








Outpatient cultures grew MRSA and Pseudomonas.


   on Dapto + Ceftolozane/Tazobactam Day 10


   ID consulted Dr. Guevara


   recommend to continue Daptomycin and Ceftolozane/Tazobactam IV x  4 more days


   Endurance IV line placed


   Follow up with ID Clinic in [facility] c/o Dr Stone in 1 week








awaiting bed at 





[facility]


No anginal symptoms.


Continue lasix, aspirin, metoprolol, amlodipine.





ATRIAL FIBRILLATION


Continue metoprolol, Eliquis


  


PULMONARY NODULES, RIGHT MIDDLE LOBE INFILTRATE


no respiratory symptoms


per patient's daughter Rupinder, patient is following with [facility] in [facility] for this 


Pulmonary consulted, recommend outpatient follow up


discussed with Dr. Bragg


- continue follow up with [facility] 





HYPERTENSION


increased Amlodipine from 2.5 to 5mg po daily


Continue metoprolol


monitor





PERIPHERAL VASCULAR DISEASE


Interventional Cardiology consulted


continue Pletal





DM TYPE 1


Insulin pump.


Pharmacy consulted


BSGs elevated


Novolog Sliding scale being adjusted


continue to monitor








8/12/18


(+) severe hypoglycemia- 14, noted to be unresponsive, shaking


Insulin pump discontinued--> changes to Lantus 5 units BID and ISS


monitor while off Insulin pump


follow recommendations of Pharmacy Glycemic control consult--> appreciate the 

recommendations





DYSLIPIDEMIA


Hold statin while receiving daptomycin.





DEMENTIA


Monitor for delirium.





MRSA


Contact precautions.





VTE PROPHYLAXIS


- on Eliquis already for A fib


 


RESUSCITATION STATUS


DNR





DISPOSITION


transition to [facility] when BSG stable for 24 hours and bed available


Follow up with Dr Moseley in 14 days from the day of surgery.  Call for an 

appointment.  [phone]


Follow up with ID Clinic in [facility] c/o Dr Stone in 1 week


Family Medicine follow-up with Dr. Mallory.


Consultants:


Ortho Dr. Moseley, ID Dr. Guevara


Procedures:


s/p Surgery by Dr. Moseley 8/7/18


   1.  Right great toe partial amputation to the level of the mid proximal


   phalanx.


   2.  Removal of fifth toe nail plate.


   3.  Irrigation and debridement of the fifth toe nail bed.


   4.  Irrigation and debridement of right heel including skin, subcutaneous


   fat, and fascia.





CT OF THE CHEST WITHOUT IV CONTRAST





CLINICAL HISTORY: Pneumonia versus lung mass.    





COMPARISON STUDY:  Chest radiograph August 6, 2018. 





CT DOSE: 207.46 mGycm





TECHNIQUE:  Axial images of the chest were obtained without IV contrast.  Images


were reviewed in the axial, sagittal, and coronal planes. IV contrast was not


administered for this examination.  A dose lowering technique was utilized


adhering to the principles of ALARA.








FINDINGS:  No enlarged axillary, mediastinal or hilar lymph nodes are present.


Heart is moderately enlarged. There are median sternotomy wires and postsurgical


findings consistent with bypass grafting. There is no pericardial effusion. No


pneumothorax or pleural effusion is noted. Lingular opacity is noted. There is


also a 2.3 x 1.4 cm nodular right middle lobe opacity shown on axial image 200


of 326. There are numerous pulmonary nodules, many of which have irregular


margins. These include a 1 cm right upper lobe nodule shown on image 117 and an


8 mm left upper lobe nodule shown on image 125. No cavitary lesions are present.


Bony thorax is unremarkable. Visualized portions of the upper abdomen


demonstrate extensive atherosclerotic calcification.





IMPRESSION:  





1. Right middle lobe and lingular opacities. The findings may reflect an


infectious etiology such as atypical mycobacterial infection. A 2.3 x 1.4 cm


nodular right middle lobe opacity is indeterminate. Although an


infectious/inflammatory process is favored, a neoplastic process could appear


similar and a follow-up chest CT in one month is recommended. Alternately,


bronchoscopy could be performed.





2. Numerous ill-defined nodules throughout the lungs. An infectious/inflammatory


process is favored however metastatic disease could appear similar. These


nodules should be assessed on follow-up chest CT.





3. No thoracic lymphadenopathy.





4. Moderate cardiomegaly and extensive coronary artery calcification.


Current Inpatient Medications:





Current Inpatient Medications








 Medications


  (Trade)  Dose


 Ordered  Sig/Amparo


 Route  Start Time


 Stop Time Status Last Admin


Dose Admin


 


 Acetaminophen


  (Tylenol Tab)  1,000 mg  Q6  PRN


 PO  8/2/18 18:00


 9/1/18 17:59  8/8/18 22:03


1,000 MG


 


 Apixaban


  (Eliquis)  5 mg  BID


 PO  8/2/18 21:00


 9/1/18 20:59 Future hold 8/12/18 21:21


5 MG


 


 Aspirin


  (Ecotrin Tab)  81 mg  DAILY


 PO  8/3/18 09:00


 9/2/18 08:59 Future hold 8/12/18 09:10


81 MG


 


 Cilostazol


  (Pletal Tab)  100 mg  BID


 PO  8/2/18 21:00


 9/1/18 20:59 Future hold 8/12/18 21:20


100 MG


 


 Citalopram


 Hydrobromide


  (celeXA TAB)  20 mg  DAILY


 PO  8/3/18 09:00


 9/2/18 08:59  8/12/18 09:11


20 MG


 


 Docusate Sodium


  (coLACE CAP)  100 mg  BID


 PO  8/2/18 21:00


 9/1/18 20:59  8/12/18 21:19


100 MG


 


 Donepezil HCl


  (Aricept Tab)  10 mg  DAILY


 PO  8/3/18 09:00


 9/2/18 08:59  8/12/18 09:08


10 MG


 


 Levothyroxine


 Sodium


  (Synthroid Tab)  88 mcg  DAILYBB


 PO  8/3/18 06:00


 9/2/18 06:59  8/13/18 06:15


88 MCG


 


 Memantine


  (Namenda Tab)  10 mg  BID


 PO  8/2/18 21:00


 9/1/18 20:59  8/12/18 21:19


10 MG


 


 Multivitamins


  (Multivitamin


 Tab)  1 tab  DAILY


 PO  8/3/18 09:00


 9/2/18 08:59  8/12/18 09:08


1 TAB


 


 Pantoprazole


 Sodium


  (Protonix Tab)  40 mg  DAILY


 PO  8/3/18 09:00


 9/2/18 08:59  8/12/18 09:11


40 MG


 


 Metoprolol


 Tartrate


  (Lopressor Tab)  25 mg  BID


 PO  8/2/18 21:00


 9/1/18 20:59  8/12/18 21:19


25 MG


 


 Mirtazapine


  (Remeron Tab)  7.5 mg  HS


 PO  8/2/18 21:00


 9/1/18 20:59  8/12/18 21:20


7.5 MG


 


 Calcium/Vitamin D


  (Caltrate Plus


 Tab)  1 tab  DAILY


 PO  8/3/18 09:00


 9/2/18 08:59  8/12/18 09:10


1 TAB


 


 Polyethylene


  (Miralax Powder


 Packet)  17 gm  DAILY


 PO  8/3/18 09:00


 9/2/18 08:59 Future hold 8/12/18 09:08


17 GM


 


 Miscellaneous


 Information


  (Consult


 Glycemic


 Management


 Pharmacy)  1 ea  UD  PRN


 N/A  8/2/18 19:42


 9/1/18 19:41   


 


 


 Daptomycin


  (Consult)  1 ea  UD  PRN


 N/A  8/2/18 20:15


 9/1/18 20:14   


 


 


 Daptomycin 400 mg/


 Syringe  8 ml @ 4


 mls/min  Q24H


 IV  8/2/18 22:00


 9/13/18 21:59  8/12/18 21:36


4 MLS/MIN


 


 Insulin Aspart


  (novoLOG INSULIN


 PUMP)  1 ea  ACHS


 N/A  8/2/18 21:00


 9/1/18 20:59 Future Hold 8/12/18 21:17


1 EA


 


 Insulin Aspart


  (novoLOG ASPART)  SLIDING


 SCALE  PRN  PRN


 SC  8/2/18 20:45


 9/1/18 20:44 Future Hold 8/12/18 21:55


180 UNITS


 


 Glucose


  (Glucose 40% Gel)  15-30


 GRAMS 15


 GRAMS...  UD  PRN


 PO  8/2/18 20:45


 9/1/18 20:44   


 


 


 Glucose


  (Glucose Chew


 Tab)  4-8


 Tablets 4


 Tabl...  UD  PRN


 PO  8/2/18 20:45


 9/1/18 20:44   


 


 


 Glucagon


  (Glucagon Inj)  1 mg  UD  PRN


 IM  8/2/18 20:45


 9/1/18 20:44   


 


 


 Dextrose


  (Dextrose 50%


 50ML Syringe)  25-50ML


 25ML FOR


 ...  UD  PRN


 IV  8/2/18 20:45


 9/1/18 20:44   


 


 


 Carbohydrates


  (Carbohydrates


 For Hypoglycemia)  15-30 GRAMS


 15 grams if


 BSG 54-69...  UD  PRN


 PO  8/2/18 20:45


 9/1/18 20:44  8/5/18 04:23


15 GM


 


 Ceftolozane/


 Tazobactam 1.5 gm/


 Dextrose  111.4 ml @ 


 111.4 mls/


 hr  Q8H


 IV  8/3/18 16:00


 8/13/18 15:59  8/13/18 07:46


111.4 MLS/HR


 


 Diphenhydramine


 HCl


  (Benadryl Cap)  25 mg  BID  PRN


 PO  8/3/18 16:30


 9/2/18 16:29   


 


 


 Insulin Aspart


  (novoLOG ASPART)  **SLIDING


 SCALE**


 **G...  AC


 SC  8/6/18 18:00


 9/5/18 17:59  8/12/18 17:59


8 UNITS


 


 Morphine Sulfate


  (MoRPHine


 SULFATE INJ)  3 mg  Q3HWA  PRN


 IV  8/8/18 00:00


 8/22/18 00:00  8/8/18 23:43


3 MG


 


 Tramadol HCl


  (Ultram Tab)  50 mg  Q6H  PRN


 PO  8/8/18 08:00


 9/7/18 07:59  8/9/18 21:36


50 MG


 


 Amlodipine


 Besylate


  (Norvasc Tab)  5 mg  DAILY


 PO  8/9/18 09:00


 9/2/18 08:59  8/12/18 09:07


5 MG


 


 Furosemide


  (Lasix Tab)  40 mg  QAM


 PO  8/9/18 09:00


 9/8/18 08:59  8/12/18 09:09


40 MG


 


 Insulin Glargine


  (Lantus Solostar


 Pen)  5 units  BID


 SC  8/13/18 06:30


 9/12/18 06:29  8/13/18 06:23


5 UNITS


 


 Magnesium Oxide


  (Mag-Ox Tab)  400 mg  BID


 PO  8/13/18 09:00


 9/12/18 08:59

## 2018-08-14 VITALS
SYSTOLIC BLOOD PRESSURE: 106 MMHG | DIASTOLIC BLOOD PRESSURE: 66 MMHG | HEART RATE: 121 BPM | OXYGEN SATURATION: 97 % | TEMPERATURE: 97.52 F

## 2018-08-14 VITALS
OXYGEN SATURATION: 94 % | SYSTOLIC BLOOD PRESSURE: 127 MMHG | TEMPERATURE: 97.88 F | HEART RATE: 119 BPM | DIASTOLIC BLOOD PRESSURE: 64 MMHG

## 2018-08-14 VITALS
HEART RATE: 118 BPM | TEMPERATURE: 98.24 F | DIASTOLIC BLOOD PRESSURE: 84 MMHG | OXYGEN SATURATION: 97 % | SYSTOLIC BLOOD PRESSURE: 149 MMHG

## 2018-08-14 VITALS
DIASTOLIC BLOOD PRESSURE: 70 MMHG | OXYGEN SATURATION: 98 % | SYSTOLIC BLOOD PRESSURE: 109 MMHG | TEMPERATURE: 98.06 F | HEART RATE: 124 BPM

## 2018-08-14 LAB
BUN SERPL-MCNC: 36 MG/DL (ref 7–18)
CALCIUM SERPL-MCNC: 8.7 MG/DL (ref 8.5–10.1)
CO2 SERPL-SCNC: 29 MMOL/L (ref 21–32)
CREAT SERPL-MCNC: 0.97 MG/DL (ref 0.6–1.2)
GLUCOSE SERPL-MCNC: 169 MG/DL (ref 70–99)
POTASSIUM SERPL-SCNC: 4.4 MMOL/L (ref 3.5–5.1)
SODIUM SERPL-SCNC: 138 MMOL/L (ref 136–145)

## 2018-08-14 RX ADMIN — INSULIN ASPART SCH EA: 100 INJECTION, SOLUTION INTRAVENOUS; SUBCUTANEOUS at 21:08

## 2018-08-14 RX ADMIN — Medication SCH TAB: at 09:37

## 2018-08-14 RX ADMIN — INSULIN ASPART SCH UNITS: 100 INJECTION, SOLUTION INTRAVENOUS; SUBCUTANEOUS at 12:38

## 2018-08-14 RX ADMIN — DOCUSATE SODIUM SCH MG: 100 CAPSULE, LIQUID FILLED ORAL at 20:35

## 2018-08-14 RX ADMIN — PANTOPRAZOLE SCH MG: 40 TABLET, DELAYED RELEASE ORAL at 09:39

## 2018-08-14 RX ADMIN — Medication SCH MG: at 09:34

## 2018-08-14 RX ADMIN — Medication SCH MG: at 09:37

## 2018-08-14 RX ADMIN — METOPROLOL TARTRATE SCH MG: 50 TABLET, FILM COATED ORAL at 20:34

## 2018-08-14 RX ADMIN — Medication SCH MG: at 20:34

## 2018-08-14 RX ADMIN — MIRTAZAPINE SCH MG: 15 TABLET, FILM COATED ORAL at 20:32

## 2018-08-14 RX ADMIN — LEVOTHYROXINE SODIUM SCH MCG: 88 TABLET ORAL at 05:53

## 2018-08-14 RX ADMIN — CILOSTAZOL SCH MG: 100 TABLET ORAL at 20:31

## 2018-08-14 RX ADMIN — SODIUM CHLORIDE SCH MLS/HR: 900 INJECTION, SOLUTION INTRAVENOUS at 05:52

## 2018-08-14 RX ADMIN — AMLODIPINE BESYLATE SCH MG: 5 TABLET ORAL at 09:38

## 2018-08-14 RX ADMIN — INSULIN ASPART SCH UNITS: 100 INJECTION, SOLUTION INTRAVENOUS; SUBCUTANEOUS at 09:35

## 2018-08-14 RX ADMIN — METOPROLOL TARTRATE SCH MG: 25 TABLET, FILM COATED ORAL at 09:38

## 2018-08-14 RX ADMIN — CILOSTAZOL SCH MG: 100 TABLET ORAL at 09:35

## 2018-08-14 RX ADMIN — SODIUM CHLORIDE SCH MLS/HR: 900 INJECTION, SOLUTION INTRAVENOUS at 17:30

## 2018-08-14 RX ADMIN — INSULIN ASPART SCH EA: 100 INJECTION, SOLUTION INTRAVENOUS; SUBCUTANEOUS at 12:00

## 2018-08-14 RX ADMIN — APIXABAN SCH MG: 5 TABLET, FILM COATED ORAL at 09:35

## 2018-08-14 RX ADMIN — INSULIN ASPART SCH EA: 100 INJECTION, SOLUTION INTRAVENOUS; SUBCUTANEOUS at 16:15

## 2018-08-14 RX ADMIN — MEMANTINE HYDROCHLORIDE SCH MG: 10 TABLET ORAL at 09:36

## 2018-08-14 RX ADMIN — DAPTOMYCIN SCH MLS/MIN: 50 INJECTION, POWDER, LYOPHILIZED, FOR SOLUTION INTRAVENOUS at 09:51

## 2018-08-14 RX ADMIN — INSULIN ASPART SCH EA: 100 INJECTION, SOLUTION INTRAVENOUS; SUBCUTANEOUS at 08:00

## 2018-08-14 RX ADMIN — CITALOPRAM HYDROBROMIDE SCH MG: 20 TABLET ORAL at 09:38

## 2018-08-14 RX ADMIN — DONEPEZIL HYDROCHLORIDE SCH MG: 10 TABLET, FILM COATED ORAL at 09:39

## 2018-08-14 RX ADMIN — Medication SCH TAB: at 09:39

## 2018-08-14 RX ADMIN — APIXABAN SCH MG: 5 TABLET, FILM COATED ORAL at 20:32

## 2018-08-14 RX ADMIN — INSULIN ASPART SCH UNITS: 100 INJECTION, SOLUTION INTRAVENOUS; SUBCUTANEOUS at 17:13

## 2018-08-14 RX ADMIN — CEFTOLOZANE AND TAZOBACTAM SCH MLS/HR: 1; .5 INJECTION, POWDER, LYOPHILIZED, FOR SOLUTION INTRAVENOUS at 09:51

## 2018-08-14 RX ADMIN — DOCUSATE SODIUM SCH MG: 100 CAPSULE, LIQUID FILLED ORAL at 09:36

## 2018-08-14 RX ADMIN — CEFTOLOZANE AND TAZOBACTAM SCH MLS/HR: 1; .5 INJECTION, POWDER, LYOPHILIZED, FOR SOLUTION INTRAVENOUS at 17:30

## 2018-08-14 RX ADMIN — MEMANTINE HYDROCHLORIDE SCH MG: 10 TABLET ORAL at 20:34

## 2018-08-14 RX ADMIN — Medication SCH GM: at 09:00

## 2018-08-14 NOTE — PHARMACY PROGRESS NOTE
Pharmacy Glycemic Short Note 2


Date of Service


Aug 14, 2018.





OUTPATIENT ANTIDIABETIC REGIMEN: 


* NovoLog SQ insulin pump


 * Basal Rate = 0.525 units/hr


 * Bolus per parameters:


 * Goal range = 110-150 mg/dl


 * CF = 60 mg/dl/unit


 * CR = 1 unit for every 18g CHO consumed


* HbA1c:  9.4%  (8/2/18)











Item Value  Date Time


 


Bedside Glucose 119 mg/dl H 8/13/18 0255


 


Bedside Glucose 262 mg/dl H 8/13/18 0559


 


Bedside Glucose 330 mg/dl H 8/13/18 0826


 


Bedside Glucose 520 mg/dl *H 8/13/18 1051


 


Bedside Glucose 500 mg/dl *H 8/13/18 1053


 


Bedside Glucose 432 mg/dl *H 8/13/18 1159


 


Bedside Glucose 384 mg/dl *H 8/13/18 1350


 


Bedside Glucose 173 mg/dl H 8/13/18 1704


 


Bedside Glucose 191 mg/dl H 8/14/18 0153


 


Bedside Glucose 168 mg/dl H 8/14/18 0759











ASSESSMENT:





8/14/18


* Severe hyperglycemia due to basal insulin deficiency resolved s/p resumption 

of Novolog pump. 


* Novolog insulin pump re-connected ~1900 yesterday by patient's daughter, 

Rupinder. 


* BSG improvement noted this am (fasting = 168 mg/dL)


* Continue basal insulin via insulin pump and bolus insulin via nursing 

administration


* Will consider tightening correction factor if meal-time BSGs remain elevated.





8/13/18


* Mrs. Reyes received a total of 37.6 units of insulin yesterday (daily 

requirement has been consistent the past 72 hours).


* Improvement in BSGs was noted throughout the day, however, patient had a 

significant symptomatic low around midnight.  Exact cause of hypoglycemia is 

unknown. Insulin pump was removed at the time. 


 * Nursing notes state that upon entering the patients room she was found 

twitching, which progressed to seizure like activity. A code purple was called 

and BSG at that time was 14 mg/dL.


 * Her BSG at HS was 217 mg/dL. She was given no bolus insulin. Last dose of 

Novolog was 8 units with dinner. 


 * Day shift RN reported that the patients insulin pump was re-filled at 2200 

last evening by nursing staff. Pump site had been previously changed and 

refilled on 8/10. CDE assisted patient in the process and confirms that there 

should have been enough insulin in the patients pump to last the weekend.


 * I reviewed pump data this morning with CDE. Although we can not 100% rule 

out pump malfunction, there was no evidence to suggest this. The pump read that 

the last time the bolus feature was used was 8/2 and the total number of units 

per day matched my calculations. 


* Based on low suspicion of pump malfunction and care-givers preference to 

continue management via insulin pump rather that basal + bolus insulin, I feel 

that once BSG is back under control her pump can be resumed. She currently has 

5 units of Lantus on board (~50% of daily basal needs). I would recommend 

waiting until next Lantus dose is due to resume pump. 


* I contacted patients daughter, Rupinder, to discuss treatment plan. Rupinder will be 

coming to the hospital at 1800 today. She will re-connect pump and resume 

patient's home basal rate. Last BSG was 384 mg/dL (after administration of 5 

units IV regular insulin bolus and tightening Novolog CR). I am hesitant to 

make further changes at this time for fear of overcorrecting her. I suspect 

that BSG elevation is due to basal deficiency. We are unable to give additional 

Lantus at this time per that would delay resuming insulin pump until tomorrow 

AM. 





PLAN FOR INPATIENT GLYCEMIC CONTROL: 





* Basal insulin 


 * Continue Novolog SQ insulin pump (basal only)


 * Basal rate: 0.525 units/hr





* Bolus insulin: No change; nursing to administer SQ NovoLog for meal time 

coverage since patient cannot bolus herself from pump.


 * NovoLog per scale AC or Q6hrs while NPO


 * Goal Range:  Low 100 mg/dL - High 160 mg/dL


 * Correction Factor:  30 mg/dL/unit


 * Nutritional / Prandial insulin per carb ratio of  1 unit per 7 grams CHO 

consumed


      *** NO Novolog coverage at HS, as this tends to make patient hypoglycemic 

in the morning  (BSG should still be checked) ***





DISCHARGE PLANNING: 





Updated 8/12:


* Patient's A1c (9.4%) indicates slightly suboptimal glycemic control.


* Expect that patient may resume home pump settings on discharge, as long as 

she will be able to operate her pump effectively.


 * Patient's daughter generally manages her pump.  


 * Uncertain at this time if patient will return home or go to [facility]. 

Unsure that pump will be manageable at [facility]?


* Expect that patient's basal rate is reasonable, but that she may require 

slightly tighter CF/CR parameters.  


* It does sound like patient experiences episodes of hypoglycemia at home (

mainly in the morning), so would continue to omit HS coverage.


-----------------------


* If patient is able to continue pump:


 * Continue insulin pump with basal rate only


 * Novolog bolus insulin with meals:


 * Novolog per sliding scale using CF of 30 mg/dL


 * Novolog 7 units with each meal (hold if meal is skipped)





* If patient is discharged to [facility] and is NOT able to continue pump, 

consider:


 * Lantus 12 units SQ qAM


 * Novolog per sliding scale using CF of 30 mg/dL


 * Novolog 7 units with each meal (hold if meal is skipped)





**Please note: The above Novolog parameters are more aggressive than her 

outpatient pump settings because requirements have been more due to current 

infection.  These will need loosened over time.

## 2018-08-14 NOTE — CARDIOLOGY FOLLOW-UP
Subjective


Date of Service:


Aug 14, 2018.


Pt evaluation today including:  conversation w/ patient, physical exam, chart 

review, lab review, review of studies, review of inpatient medication list, 

conversation w/ attending


History of Present Illness


I was notified by the primary service that the patient's heart rate had 

increased this afternoon.  When talking to the patient she has not noticed any 

change in heart rate.  In in fact, she was feeling quite well.  She kept 

repeating the fact that she was I think I am doing very well.  She has not 

report any chest pain.  She had a good appetite and just finished her dinner.  

Denies any breathing difficulty.  She denies any pain in her operative site.





Social History


Smoking Status:  Former Smoker


History of Alcohol Use:  No





Objective





Vital Signs Past 12 Hours








  Date Time  Temp Pulse Resp B/P (MAP) Pulse Ox O2 Delivery O2 Flow Rate FiO2


 


8/14/18 15:25 36.7 124 18 109/70 (83) 98 Room Air  


 


8/14/18 08:30      Room Air  


 


8/14/18 07:17 36.6 119 18 127/64 (85) 94 Room Air  








Last Recorded Weight-Kilograms:  60.000


Intake & Output











8-Hour Column   


 


 8/14/18 8/15/18 8/15/18





 16:00 00:00 08:00


 


Intake Total 989 ml  


 


Output Total 500 ml  


 


Balance 489 ml  














24-Hour Column 


 


 8/15/18





 08:00


 


Intake Total 989 ml


 


Output Total 500 ml


 


Balance 489 ml








Physical Exam


She is alert and oriented .  She seemed answer questions appropriately but I 

think overall her history was not reliable.


HEENT:  Sclerae are anicteric.  Pupils are equal and reactive to light and 

accommodation.  Extraocular movements were intact.


Neuro:  Cranial nerves intact


Neck:  Examination of the submandibular region did not reveal any significant 

lymphadenopathy.  Carotids are palpable bilaterally and free of bruits on 

auscultation.  There was no evidence of jugular venous distention. The thyroid 

was not enlarged.


Lungs:  Lungs are clear to auscultation bilaterally.  There are no rales 

wheezes or rhonchi.  She has normal respiratory effort without use of accessory 

muscles. There is normal pulmonary excursion.


Cardiac:  The rhythm was regular.  S1 and S2 were normal.  There are no murmurs 

on examination.  The PMI was not markedly displaced on palpation.


Abdomen:  The abdomen was soft and nontender.


Extremities:  Patient has bilateral radial pulses that are equal in intensity.  


Skin:  There are no rashes noted on examination today.





Data


Laboratory Results:





Last 24 Hours








Test


  8/13/18


19:41 8/13/18


20:41 8/14/18


01:53 8/14/18


05:45


 


Sodium Level 133 mmol/L    


 


Potassium Level 5.2 mmol/L    


 


Chloride Level 99 mmol/L    


 


Carbon Dioxide Level 28 mmol/L    


 


Anion Gap 6.0 mmol/L    


 


Blood Urea Nitrogen 39 mg/dl    


 


Creatinine 1.64 mg/dl    


 


Est Creatinine Clear Calc


Drug Dose 28.1 ml/min 


  


  


  


 


 


Estimated GFR (


American) 35.1 


  


  


  


 


 


Estimated GFR (Non-


American 30.3 


  


  


  


 


 


BUN/Creatinine Ratio 23.8    


 


Random Glucose 272 mg/dl    


 


Calcium Level 9.7 mg/dl    


 


Bedside Glucose  295 mg/dl  191 mg/dl  190 mg/dl 


 


Test


  8/14/18


07:13 8/14/18


07:59 8/14/18


11:51 8/14/18


15:51


 


Sodium Level 138 mmol/L    


 


Potassium Level 4.4 mmol/L    


 


Chloride Level 103 mmol/L    


 


Carbon Dioxide Level 29 mmol/L    


 


Anion Gap 6.0 mmol/L    


 


Blood Urea Nitrogen 36 mg/dl    


 


Creatinine 0.97 mg/dl    


 


Est Creatinine Clear Calc


Drug Dose 47.5 ml/min 


  


  


  


 


 


Estimated GFR (


American) 66.2 


  


  


  


 


 


Estimated GFR (Non-


American 57.1 


  


  


  


 


 


BUN/Creatinine Ratio 36.9    


 


Random Glucose 169 mg/dl    


 


Calcium Level 8.7 mg/dl    


 


Magnesium Level 1.9 mg/dl    


 


Bedside Glucose  168 mg/dl  241 mg/dl  51 mg/dl 


 


Test


  8/14/18


15:52 8/14/18


16:11 


  


 


 


Bedside Glucose 51 mg/dl  77 mg/dl   








Imaging: 





EKG:





Telemetry reviewed:





Assessment and Plan


1. Atrial flutter:  Patient has history of paroxysmal atrial fibrillation.  She 

cannot give a good history.  She is not aware of any arrhythmia currently and 

did not report symptoms of palpitations in the past.  She cannot recall she has 

ever had a cardioversion.  She is not appear to be overtly symptomatic and has 

not developed hypotension or other hemodynamic derangement.  I think increasing 

her beta-blocker would be a reasonable intervention at this time, but I am 

doubtful this will have any significant effect.  There is a remote possibility 

that she will convert on her own back to a sinus rhythm. She has been 

appropriately anticoagulated during her hospitalization.  I think would be 

reasonable to plan for an elective cardioversion tomorrow if she does not 

convert on her own.

## 2018-08-14 NOTE — PROGRESS NOTE
Internal Med Progress Note


Date of Service:


Aug 14, 2018.


Provider Documentation:





SUBJECTIVE:





Seen and examined at bedside


States feeling well


Denies chest pain, SOB, dizziness, palpitations


Also denies any leg pain


No other complaints 





OBJECTIVE:





Vital Signs-as noted below





Physical Exam:


General Appearance:Thin, no apparent distress


Head:  normocephalic, Atraumatic 


Eyes:  normal inspection, EOMI, PERRL


Neck:  supple, Trachea midline


Respiratory/Chest: Normal breath sounds, CTA


Cardiovascular: Irregularly, irregular, +Tachycardia, No murmur


Abdomen/GI:Soft, Non tender, Bowel sounds present


Extremities/Musculoskelatal:normal inspection, no edema, R foot in dressing  


Neurologic/Psych:AAOX3, grossly no focal neurological deficits 


Skin:  normal color, warm





Lab data as noted below.


ASSESSMENT & PLAN:








Cellulitis Right Foot/ Gangrene Right Great Toe, 5th Toe


S/P Right great toe partial amputation; Removal of fifth toe nail plate and 

debridement of of the fifth toe nail bed & right heel


Appreciate Orthopedics Help 


Recommendations per Ortho:


   Daily dressing changes to the right foot.  Keep an eye on her heel as well. (

breakdown?)


   Weightbearing on the heel only with protective shoe.


   Ok to shower if you keep the dressing covered with a waterproof covering.


   Call if you have increased temp of 101.5 or greater, increased redness, 

increased swelling or drainage.  


   Follow up with Dr Moseley in 14 days from the day of surgery.  Call for an 

appointment.  





Outpatient cultures grew MRSA and Pseudomonas.


Continue Dapto + Ceftolozane/Tazobactam Day 11


Appreciate ID Input Dr. Guevara


Needs 4 more days of Abx


Endurance IV line placed


Follow up with ID Clinic in [facility] c/o Dr Stone in 1 week








CAD


No anginal symptoms.


Continue aspirin, metoprolol, amlodipine.


Lasix Held








Afib RVR 


Continue Eliquis


Metoprolol dose increased to 50mg BID for better rate control  











Pulmonary Nodules, Right Middle Lobe Infiltrate


no respiratory symptoms


per patient's daughter Rupinder, patient is following with [facility] in [facility]  


Pulmonary consulted, recommend outpatient follow up


 





HTN:


Increased Amlodipine from 2.5 to 5mg po daily


Continue metoprolol


monitor








Peripheral Vascular Disease


Interventional Cardiology consulted


continue Pletal








DM TYPE 1


Insulin pump.


Pharmacy consulted


BSGs elevated


Novolog Sliding scale being adjusted


continue to monitor for Hypoglycemic episodes








Dyslipidemia


Hold statin while receiving daptomycin.








Dementia


Monitor for delirium.








MRSA


Contact precautions.








DVT Px:


on Eliquis 


 





Code Status:


DNR





Disposition:


transition to [facility] when medically stable 


Follow up with Dr Moseley in 14 days from the day of surgery.  Call for an 

appointment.  [phone]


Follow up with ID Clinic in [facility] c/o Dr Stone in 1 week


Family Medicine follow-up with Dr. Mallory.





Consultants:


Ortho Dr. Moseley, ID Dr. Guevara





Procedures:


s/p Surgery by Dr. Moseley 8/7/18


   1.  Right great toe partial amputation to the level of the mid proximal


   phalanx.


   2.  Removal of fifth toe nail plate.


   3.  Irrigation and debridement of the fifth toe nail bed.


   4.  Irrigation and debridement of right heel including skin, subcutaneous


   fat, and fascia.


Vital Signs:











  Date Time  Temp Pulse Resp B/P (MAP) Pulse Ox O2 Delivery O2 Flow Rate FiO2


 


8/14/18 08:30      Room Air  


 


8/14/18 07:17 36.6 119 18 127/64 (85) 94 Room Air  


 


8/14/18 00:20      Room Air  


 


8/13/18 23:32 36.4 104 18 110/64 (79) 96 Room Air  


 


8/13/18 21:54  113  119/64 (82)    


 


8/13/18 18:51  109  129/74 (92)    


 


8/13/18 16:00      Room Air  


 


8/13/18 15:22 36.6 116 16 92/58 (69) 96 Room Air  








Lab Results:





Results Past 24 Hours








Test


  8/13/18


17:04 8/13/18


19:41 8/13/18


20:41 8/14/18


01:53 Range/Units


 


 


Bedside Glucose 173  295 191 70-90  mg/dl


 


Sodium Level  133   136-145  mmol/L


 


Potassium Level  5.2   3.5-5.1  mmol/L


 


Chloride Level  99     mmol/L


 


Carbon Dioxide Level  28   21-32  mmol/L


 


Anion Gap  6.0   3-11  mmol/L


 


Blood Urea Nitrogen  39   7-18  mg/dl


 


Creatinine


  


  1.64


  


  


  0.60-1.20


mg/dl


 


Est Creatinine Clear Calc


Drug Dose 


  28.1


  


  


   ml/min


 


 


Estimated GFR (


American) 


  35.1


  


  


   


 


 


Estimated GFR (Non-


American 


  30.3


  


  


   


 


 


BUN/Creatinine Ratio  23.8   10-20  


 


Random Glucose  272   70-99  mg/dl


 


Calcium Level  9.7   8.5-10.1  mg/dl


 


Test


  8/14/18


05:45 8/14/18


07:13 8/14/18


07:59 8/14/18


11:51 Range/Units


 


 


Bedside Glucose 190  168 241 70-90  mg/dl


 


Sodium Level  138   136-145  mmol/L


 


Potassium Level  4.4   3.5-5.1  mmol/L


 


Chloride Level  103     mmol/L


 


Carbon Dioxide Level  29   21-32  mmol/L


 


Anion Gap  6.0   3-11  mmol/L


 


Blood Urea Nitrogen  36   7-18  mg/dl


 


Creatinine


  


  0.97


  


  


  0.60-1.20


mg/dl


 


Est Creatinine Clear Calc


Drug Dose 


  47.5


  


  


   ml/min


 


 


Estimated GFR (


American) 


  66.2


  


  


   


 


 


Estimated GFR (Non-


American 


  57.1


  


  


   


 


 


BUN/Creatinine Ratio  36.9   10-20  


 


Random Glucose  169   70-99  mg/dl


 


Calcium Level  8.7   8.5-10.1  mg/dl


 


Magnesium Level  1.9   1.8-2.4  mg/dl

## 2018-08-15 VITALS — SYSTOLIC BLOOD PRESSURE: 149 MMHG | DIASTOLIC BLOOD PRESSURE: 63 MMHG | OXYGEN SATURATION: 96 % | HEART RATE: 55 BPM

## 2018-08-15 VITALS
HEART RATE: 66 BPM | OXYGEN SATURATION: 90 % | SYSTOLIC BLOOD PRESSURE: 122 MMHG | DIASTOLIC BLOOD PRESSURE: 63 MMHG | TEMPERATURE: 98.42 F

## 2018-08-15 VITALS — HEART RATE: 55 BPM | OXYGEN SATURATION: 96 % | DIASTOLIC BLOOD PRESSURE: 63 MMHG | SYSTOLIC BLOOD PRESSURE: 149 MMHG

## 2018-08-15 VITALS
HEART RATE: 61 BPM | TEMPERATURE: 98.24 F | OXYGEN SATURATION: 96 % | SYSTOLIC BLOOD PRESSURE: 155 MMHG | DIASTOLIC BLOOD PRESSURE: 65 MMHG

## 2018-08-15 VITALS
DIASTOLIC BLOOD PRESSURE: 65 MMHG | TEMPERATURE: 97.88 F | OXYGEN SATURATION: 97 % | SYSTOLIC BLOOD PRESSURE: 135 MMHG | HEART RATE: 70 BPM

## 2018-08-15 VITALS — SYSTOLIC BLOOD PRESSURE: 136 MMHG | OXYGEN SATURATION: 96 % | DIASTOLIC BLOOD PRESSURE: 59 MMHG | HEART RATE: 105 BPM

## 2018-08-15 VITALS — OXYGEN SATURATION: 96 % | DIASTOLIC BLOOD PRESSURE: 67 MMHG | SYSTOLIC BLOOD PRESSURE: 140 MMHG | HEART RATE: 105 BPM

## 2018-08-15 VITALS
SYSTOLIC BLOOD PRESSURE: 110 MMHG | OXYGEN SATURATION: 96 % | TEMPERATURE: 98.42 F | HEART RATE: 73 BPM | DIASTOLIC BLOOD PRESSURE: 55 MMHG

## 2018-08-15 VITALS
SYSTOLIC BLOOD PRESSURE: 132 MMHG | HEART RATE: 125 BPM | OXYGEN SATURATION: 96 % | DIASTOLIC BLOOD PRESSURE: 78 MMHG | TEMPERATURE: 98.24 F

## 2018-08-15 VITALS — HEART RATE: 105 BPM | DIASTOLIC BLOOD PRESSURE: 79 MMHG | OXYGEN SATURATION: 96 % | SYSTOLIC BLOOD PRESSURE: 147 MMHG

## 2018-08-15 VITALS
OXYGEN SATURATION: 96 % | TEMPERATURE: 98.06 F | HEART RATE: 124 BPM | DIASTOLIC BLOOD PRESSURE: 75 MMHG | SYSTOLIC BLOOD PRESSURE: 128 MMHG

## 2018-08-15 LAB
BUN SERPL-MCNC: 32 MG/DL (ref 7–18)
CALCIUM SERPL-MCNC: 8.5 MG/DL (ref 8.5–10.1)
CO2 SERPL-SCNC: 30 MMOL/L (ref 21–32)
CREAT SERPL-MCNC: 0.91 MG/DL (ref 0.6–1.2)
EOSINOPHIL NFR BLD AUTO: 297 K/UL (ref 130–400)
GLUCOSE SERPL-MCNC: 127 MG/DL (ref 70–99)
HCT VFR BLD CALC: 30.4 % (ref 37–47)
HGB BLD-MCNC: 9.6 G/DL (ref 12–16)
MCH RBC QN AUTO: 29 PG (ref 25–34)
MCHC RBC AUTO-ENTMCNC: 31.6 G/DL (ref 32–36)
MCV RBC AUTO: 91.8 FL (ref 80–100)
PMV BLD AUTO: 10 FL (ref 7.4–10.4)
POTASSIUM SERPL-SCNC: 4.2 MMOL/L (ref 3.5–5.1)
RED CELL DISTRIBUTION WIDTH CV: 17.7 % (ref 11.5–14.5)
RED CELL DISTRIBUTION WIDTH SD: 59.1 FL (ref 36.4–46.3)
SODIUM SERPL-SCNC: 141 MMOL/L (ref 136–145)
WBC # BLD AUTO: 4.69 K/UL (ref 4.8–10.8)

## 2018-08-15 PROCEDURE — 5A2204Z RESTORATION OF CARDIAC RHYTHM, SINGLE: ICD-10-PCS | Performed by: INTERNAL MEDICINE

## 2018-08-15 RX ADMIN — SODIUM CHLORIDE SCH MLS/HR: 900 INJECTION, SOLUTION INTRAVENOUS at 07:52

## 2018-08-15 RX ADMIN — INSULIN ASPART SCH UNITS: 100 INJECTION, SOLUTION INTRAVENOUS; SUBCUTANEOUS at 07:00

## 2018-08-15 RX ADMIN — MIRTAZAPINE SCH MG: 15 TABLET, FILM COATED ORAL at 21:23

## 2018-08-15 RX ADMIN — DOCUSATE SODIUM SCH MG: 100 CAPSULE, LIQUID FILLED ORAL at 21:22

## 2018-08-15 RX ADMIN — Medication SCH TAB: at 07:53

## 2018-08-15 RX ADMIN — INSULIN ASPART SCH EA: 100 INJECTION, SOLUTION INTRAVENOUS; SUBCUTANEOUS at 07:00

## 2018-08-15 RX ADMIN — Medication SCH MG: at 21:22

## 2018-08-15 RX ADMIN — CEFTOLOZANE AND TAZOBACTAM SCH MLS/HR: 1; .5 INJECTION, POWDER, LYOPHILIZED, FOR SOLUTION INTRAVENOUS at 02:28

## 2018-08-15 RX ADMIN — Medication SCH GM: at 07:54

## 2018-08-15 RX ADMIN — METOPROLOL TARTRATE SCH MG: 50 TABLET, FILM COATED ORAL at 21:23

## 2018-08-15 RX ADMIN — MEMANTINE HYDROCHLORIDE SCH MG: 10 TABLET ORAL at 21:23

## 2018-08-15 RX ADMIN — DOCUSATE SODIUM SCH MG: 100 CAPSULE, LIQUID FILLED ORAL at 07:53

## 2018-08-15 RX ADMIN — PANTOPRAZOLE SCH MG: 40 TABLET, DELAYED RELEASE ORAL at 07:53

## 2018-08-15 RX ADMIN — LEVOTHYROXINE SODIUM SCH MCG: 88 TABLET ORAL at 05:53

## 2018-08-15 RX ADMIN — DONEPEZIL HYDROCHLORIDE SCH MG: 10 TABLET, FILM COATED ORAL at 07:53

## 2018-08-15 RX ADMIN — CEFTOLOZANE AND TAZOBACTAM SCH MLS/HR: 1; .5 INJECTION, POWDER, LYOPHILIZED, FOR SOLUTION INTRAVENOUS at 09:45

## 2018-08-15 RX ADMIN — INSULIN ASPART SCH EA: 100 INJECTION, SOLUTION INTRAVENOUS; SUBCUTANEOUS at 21:25

## 2018-08-15 RX ADMIN — DAPTOMYCIN SCH MLS/MIN: 50 INJECTION, POWDER, LYOPHILIZED, FOR SOLUTION INTRAVENOUS at 09:45

## 2018-08-15 RX ADMIN — INSULIN ASPART SCH EA: 100 INJECTION, SOLUTION INTRAVENOUS; SUBCUTANEOUS at 11:00

## 2018-08-15 RX ADMIN — APIXABAN SCH MG: 5 TABLET, FILM COATED ORAL at 21:22

## 2018-08-15 RX ADMIN — Medication SCH MG: at 07:56

## 2018-08-15 RX ADMIN — CEFTOLOZANE AND TAZOBACTAM SCH MLS/HR: 1; .5 INJECTION, POWDER, LYOPHILIZED, FOR SOLUTION INTRAVENOUS at 18:30

## 2018-08-15 RX ADMIN — AMLODIPINE BESYLATE SCH MG: 5 TABLET ORAL at 07:53

## 2018-08-15 RX ADMIN — INSULIN ASPART SCH UNITS: 100 INJECTION, SOLUTION INTRAVENOUS; SUBCUTANEOUS at 11:00

## 2018-08-15 RX ADMIN — INSULIN ASPART SCH UNITS: 100 INJECTION, SOLUTION INTRAVENOUS; SUBCUTANEOUS at 16:15

## 2018-08-15 RX ADMIN — INSULIN ASPART SCH EA: 100 INJECTION, SOLUTION INTRAVENOUS; SUBCUTANEOUS at 18:31

## 2018-08-15 RX ADMIN — CILOSTAZOL SCH MG: 100 TABLET ORAL at 21:22

## 2018-08-15 RX ADMIN — MEMANTINE HYDROCHLORIDE SCH MG: 10 TABLET ORAL at 07:55

## 2018-08-15 RX ADMIN — CITALOPRAM HYDROBROMIDE SCH MG: 20 TABLET ORAL at 07:53

## 2018-08-15 RX ADMIN — METOPROLOL TARTRATE SCH MG: 50 TABLET, FILM COATED ORAL at 07:55

## 2018-08-15 RX ADMIN — APIXABAN SCH MG: 5 TABLET, FILM COATED ORAL at 07:55

## 2018-08-15 RX ADMIN — CILOSTAZOL SCH MG: 100 TABLET ORAL at 07:56

## 2018-08-15 RX ADMIN — Medication SCH MG: at 07:54

## 2018-08-15 NOTE — PROGRESS NOTE
Internal Med Progress Note


Date of Service:


Aug 15, 2018.


Provider Documentation:





SUBJECTIVE:





Seen and examined at bedside


Patient still in atrial flutter, rate variable from 80s to 120s


She is asymptomatic from atrial flutter


Denies chest pain, SOB, dizziness, palpitations


Also denies any leg pain


Possible cardioversion today


No other complaints 





OBJECTIVE:





Vital Signs-as noted below





Physical Exam:


General Appearance:Thin, no apparent distress


Head:  normocephalic, Atraumatic 


Eyes:  normal inspection, EOMI, PERRL


Neck:  supple, Trachea midline


Respiratory/Chest: Normal breath sounds, CTA


Cardiovascular: Irregularly, irregular, +Tachycardia, No murmur


Abdomen/GI:Soft, Non tender, Bowel sounds present


Extremities/Musculoskelatal:normal inspection, no edema, R foot in dressing  


Neurologic/Psych:AAOX3, grossly no focal neurological deficits 


Skin:  normal color, warm





Lab data as noted below.


ASSESSMENT & PLAN:








Cellulitis Right Foot/ Gangrene Right Great Toe, 5th Toe


S/P Right great toe partial amputation; Removal of fifth toe nail plate and 

debridement of of the fifth toe nail bed & right heel


Appreciate Orthopedics Help 


Recommendations per Ortho:


   Daily dressing changes to the right foot.  Keep an eye on her heel as well. (

breakdown?)


   Weightbearing on the heel only with protective shoe.


   Ok to shower if you keep the dressing covered with a waterproof covering.


   Call if you have increased temp of 101.5 or greater, increased redness, 

increased swelling or drainage.  


   Follow up with Dr Moseley in 14 days from the day of surgery.  Call for an 

appointment.  





Outpatient cultures grew MRSA and Pseudomonas.


Continue Dapto + Ceftolozane/Tazobactam Day 12


Appreciate ID Input Dr. Guevara


Needs 3 more days of Abx


Endurance IV line placed


Follow up with ID Clinic in [facility] c/o Dr Stone in 1 week








CAD


No anginal symptoms.


Continue aspirin, metoprolol, amlodipine.


Lasix Held








Atrial Flutter


H/O Afib  


Continue Eliquis


Metoprolol dose increased to 50mg BID for better rate control  


Appreciate Cardiology Input


Possible Cardioversion today











Pulmonary Nodules, Right Middle Lobe Infiltrate


no respiratory symptoms


per patient's daughter Rupinder, patient is following with [facility] in [facility]  


Pulmonary consulted, recommend outpatient follow up


 





HTN:


Increased Amlodipine from 2.5 to 5mg po daily


Continue metoprolol


monitor








Peripheral Vascular Disease


Interventional Cardiology consulted


continue Pletal








DM TYPE 1


Insulin pump.


Pharmacy consulted


BSGs elevated


Novolog Sliding scale being adjusted


continue to monitor for Hypoglycemic episodes








Dyslipidemia


Hold statin while receiving daptomycin.








Dementia


Monitor for delirium.








MRSA


Contact precautions.








DVT Px:


on Eliquis 


 





Code Status:


DNR





Disposition:


Plan to discharge to [facility] when medically stable 


Follow up with Dr Moseley in 14 days from the day of surgery.  Call for an 

appointment.  [phone]


Follow up with ID Clinic in [facility] c/o Dr Stone in 1 week


Family Medicine follow-up with Dr. Mallory.





Consultants:


Ortho Dr. Moseley, ID Dr. Guevara





Procedures:


s/p Surgery by Dr. Moseley 8/7/18


   1.  Right great toe partial amputation to the level of the mid proximal


   phalanx.


   2.  Removal of fifth toe nail plate.


   3.  Irrigation and debridement of the fifth toe nail bed.


   4.  Irrigation and debridement of right heel including skin, subcutaneous


   fat, and fascia.


Vital Signs:











  Date Time  Temp Pulse Resp B/P (MAP) Pulse Ox O2 Delivery O2 Flow Rate FiO2


 


8/15/18 08:00      Room Air  


 


8/15/18 07:50 36.8 125 18 132/78 (96) 96 Room Air  


 


8/15/18 03:45 36.7 124 17 128/75 (92) 96 Room Air  


 


8/14/18 23:11 36.8 118 17 149/84 (105) 97 Room Air  


 


8/14/18 20:35      Room Air  


 


8/14/18 19:57 36.4 121 18 106/66 (79) 97 Room Air  


 


8/14/18 15:25 36.7 124 18 109/70 (83) 98 Room Air  








Lab Results:





Results Past 24 Hours








Test


  8/14/18


11:51 8/14/18


15:51 8/14/18


15:52 8/14/18


16:11 Range/Units


 


 


Bedside Glucose 241 51 51 77 70-90  mg/dl


 


Test


  8/14/18


20:39 8/15/18


02:27 8/15/18


06:26 8/15/18


07:42 Range/Units


 


 


Bedside Glucose 114 124  131 70-90  mg/dl


 


White Blood Count   4.69  4.8-10.8  K/uL


 


Red Blood Count   3.31  4.2-5.4  M/uL


 


Hemoglobin   9.6  12.0-16.0  g/dL


 


Hematocrit   30.4  37-47  %


 


Mean Corpuscular Volume   91.8    fL


 


Mean Corpuscular Hemoglobin   29.0  25-34  pg


 


Mean Corpuscular Hemoglobin


Concent 


  


  31.6


  


  32-36  g/dl


 


 


RDW Standard Deviation   59.1  36.4-46.3  fL


 


RDW Coefficient of Variation   17.7  11.5-14.5  %


 


Platelet Count   297  130-400  K/uL


 


Mean Platelet Volume   10.0  7.4-10.4  fL


 


Sodium Level   141  136-145  mmol/L


 


Potassium Level   4.2  3.5-5.1  mmol/L


 


Chloride Level   106    mmol/L


 


Carbon Dioxide Level   30  21-32  mmol/L


 


Anion Gap   5.0  3-11  mmol/L


 


Blood Urea Nitrogen   32  7-18  mg/dl


 


Creatinine


  


  


  0.91


  


  0.60-1.20


mg/dl


 


Est Creatinine Clear Calc


Drug Dose 


  


  50.4


  


   ml/min


 


 


Estimated GFR (


American) 


  


  71.5


  


   


 


 


Estimated GFR (Non-


American 


  


  61.7


  


   


 


 


BUN/Creatinine Ratio   34.7  10-20  


 


Random Glucose   127  70-99  mg/dl


 


Calcium Level   8.5  8.5-10.1  mg/dl


 


Magnesium Level   1.9  1.8-2.4  mg/dl

## 2018-08-15 NOTE — PHARMACY PROGRESS NOTE
Pharmacy Glycemic Short Note 2


Date of Service


Aug 15, 2018.





OUTPATIENT ANTIDIABETIC REGIMEN: 


* NovoLog SQ insulin pump


 * Basal Rate = 0.525 units/hr


 * Bolus per parameters:


 * Goal range = 110-150 mg/dl


 * CF = 60 mg/dl/unit


 * CR = 1 unit for every 18g CHO consumed


* HbA1c:  9.4%  (8/2/18)











Item Value  Date Time


 


Bedside Glucose 168 mg/dl H 8/14/18 0759


 


Bedside Glucose 241 mg/dl H 8/14/18 1151


 


Bedside Glucose 51 mg/dl *L 8/14/18 1551


 


Bedside Glucose 77 mg/dl 8/14/18 1611


 


Bedside Glucose 114 mg/dl H 8/14/18 2039


 


Bedside Glucose 124 mg/dl H 8/15/18 0227


 


Bedside Glucose 131 mg/dl H 8/15/18 0742


 


Bedside Glucose 158 mg/dl H 8/15/18 1112








ASSESSMENT:





8/15/18


* Glycemic control greatly improved over the past 24 hours. 


* Of note, she did have one episode of hypoglycemia around dinnertime. 


* Patient has been made NPO for possible cardioversion


* Continue basal insulin via pump. I will loosen novolog carb ratio to 8 to 

reduce risk of hypoglycemia





8/14/18


* Severe hyperglycemia due to basal insulin deficiency resolved s/p resumption 

of Novolog pump. 


* Novolog insulin pump re-connected ~1900 yesterday by patient's daughter, 

Rupinder. 


* BSG improvement noted this am (fasting = 168 mg/dL)


* Continue basal insulin via insulin pump and bolus insulin via nursing 

administration


* Will consider tightening correction factor if meal-time BSGs remain elevated.





8/13/18


* Mrs. Reyes received a total of 37.6 units of insulin yesterday (daily 

requirement has been consistent the past 72 hours).


* Improvement in BSGs was noted throughout the day, however, patient had a 

significant symptomatic low around midnight.  Exact cause of hypoglycemia is 

unknown. Insulin pump was removed at the time. 


 * Nursing notes state that upon entering the patients room she was found 

twitching, which progressed to seizure like activity. A code purple was called 

and BSG at that time was 14 mg/dL.


 * Her BSG at HS was 217 mg/dL. She was given no bolus insulin. Last dose of 

Novolog was 8 units with dinner. 


 * Day shift RN reported that the patients insulin pump was re-filled at 2200 

last evening by nursing staff. Pump site had been previously changed and 

refilled on 8/10. CDE assisted patient in the process and confirms that there 

should have been enough insulin in the patients pump to last the weekend.


 * I reviewed pump data this morning with CDE. Although we can not 100% rule 

out pump malfunction, there was no evidence to suggest this. The pump read that 

the last time the bolus feature was used was 8/2 and the total number of units 

per day matched my calculations. 


* Based on low suspicion of pump malfunction and care-givers preference to 

continue management via insulin pump rather that basal + bolus insulin, I feel 

that once BSG is back under control her pump can be resumed. She currently has 

5 units of Lantus on board (~50% of daily basal needs). I would recommend 

waiting until next Lantus dose is due to resume pump. 


* I contacted patients daughter, Rupinder, to discuss treatment plan. Rupinder will be 

coming to the hospital at 1800 today. She will re-connect pump and resume 

patient's home basal rate. Last BSG was 384 mg/dL (after administration of 5 

units IV regular insulin bolus and tightening Novolog CR). I am hesitant to 

make further changes at this time for fear of overcorrecting her. I suspect 

that BSG elevation is due to basal deficiency. We are unable to give additional 

Lantus at this time per that would delay resuming insulin pump until tomorrow 

AM. 





PLAN FOR INPATIENT GLYCEMIC CONTROL: 





* Basal insulin 


 * Continue Novolog SQ insulin pump (basal only)


 * Basal rate: 0.525 units/hr





* Bolus insulin:  nursing to administer SQ NovoLog for meal time coverage since 

patient cannot bolus herself from pump.


 * NovoLog per scale AC or Q6hrs while NPO


 * Goal Range:  Low 120 mg/dL - High 150 mg/dL


 * Correction Factor:  30 mg/dL/unit


 * Nutritional / Prandial insulin per carb ratio of  1 unit per 8 grams CHO 

consumed


      *** NO Novolog coverage at HS, as this tends to make patient hypoglycemic 

in the morning  (BSG should still be checked) ***





DISCHARGE PLANNING: 





Updated 8/15:


* Patient's A1c (9.4%) indicates slightly suboptimal glycemic control.


* Expect that patient may resume home pump settings on discharge, as long as 

she will be able to operate her pump effectively.


 * Patient's daughter generally manages her pump.  


 * Uncertain at this time if patient will return home or go to [facility]. 

Unsure that pump will be manageable at [facility]?


* Expect that patient's basal rate is reasonable, but that she may require 

slightly tighter CF/CR parameters.  


* It does sound like patient experiences episodes of hypoglycemia at home (

mainly in the morning), so would continue to omit HS coverage.


-----------------------


* If patient is able to continue pump:


 * Continue insulin pump with basal rate only


 * Novolog bolus insulin with meals:


 * Novolog per sliding scale using CF of 30 mg/dL


 * Novolog 5 units with each meal (hold if meal is skipped)





* If patient is discharged to [facility] and is NOT able to continue pump, 

consider:


 * Lantus 12 units SQ qAM


 * Novolog per sliding scale using CF of 30 mg/dL


 * Novolog 5 units with each meal (hold if meal is skipped)





**Please note: The above Novolog parameters are more aggressive than her 

outpatient pump settings because requirements have been more due to current 

infection.  These will need loosened over time.

## 2018-08-15 NOTE — ANESTHESIOLOGY PROGRESS NOTE
Anesthesia Post Op Note


Date & Time


Aug 15, 2018 at 15:06





Vital Signs


Pain Intensity:  0





Vital Signs Past 12 Hours








  Date Time  Temp Pulse Resp B/P (MAP) Pulse Ox O2 Delivery O2 Flow Rate FiO2


 


8/15/18 15:01  62 16 150/69 (96) 98 Room Air  


 


8/15/18 14:51  55 16 149/63 96 Nasal Cannula 4 


 


8/15/18 14:50  55 16 149/63 96 Nasal Cannula 4 


 


8/15/18 14:45  53 16 136/59 96 Nasal Cannula 4 


 


8/15/18 14:43  105 16 147/79 96 Nasal Cannula 4 


 


8/15/18 11:15 36.9 73 18 110/55 (73) 96 Room Air  


 


8/15/18 08:00      Room Air  


 


8/15/18 07:50 36.8 125 18 132/78 (96) 96 Room Air  


 


8/15/18 03:45 36.7 124 17 128/75 (92) 96 Room Air  











Notes


Mental Status:  alert / awake / arousable, participated in evaluation


Pt Amnestic to Procedure:  Yes


Nausea / Vomiting:  adequately controlled


Pain:  adequately controlled


Airway Patency, RR, SpO2:  stable & adequate


BP & HR:  stable & adequate


Hydration State:  stable & adequate


Anesthetic Complications:  no major complications apparent

## 2018-08-16 VITALS
OXYGEN SATURATION: 90 % | SYSTOLIC BLOOD PRESSURE: 137 MMHG | TEMPERATURE: 98.6 F | DIASTOLIC BLOOD PRESSURE: 64 MMHG | HEART RATE: 71 BPM

## 2018-08-16 VITALS
DIASTOLIC BLOOD PRESSURE: 57 MMHG | SYSTOLIC BLOOD PRESSURE: 134 MMHG | HEART RATE: 64 BPM | OXYGEN SATURATION: 93 % | TEMPERATURE: 98.96 F

## 2018-08-16 VITALS
HEART RATE: 63 BPM | TEMPERATURE: 99.14 F | DIASTOLIC BLOOD PRESSURE: 64 MMHG | SYSTOLIC BLOOD PRESSURE: 146 MMHG | OXYGEN SATURATION: 93 %

## 2018-08-16 VITALS
SYSTOLIC BLOOD PRESSURE: 146 MMHG | TEMPERATURE: 97.7 F | HEART RATE: 62 BPM | OXYGEN SATURATION: 96 % | DIASTOLIC BLOOD PRESSURE: 66 MMHG

## 2018-08-16 VITALS
HEART RATE: 60 BPM | TEMPERATURE: 98.42 F | OXYGEN SATURATION: 91 % | SYSTOLIC BLOOD PRESSURE: 157 MMHG | DIASTOLIC BLOOD PRESSURE: 63 MMHG

## 2018-08-16 VITALS
OXYGEN SATURATION: 95 % | SYSTOLIC BLOOD PRESSURE: 137 MMHG | TEMPERATURE: 98.06 F | HEART RATE: 61 BPM | DIASTOLIC BLOOD PRESSURE: 57 MMHG

## 2018-08-16 LAB
BUN SERPL-MCNC: 25 MG/DL (ref 7–18)
CALCIUM SERPL-MCNC: 8.3 MG/DL (ref 8.5–10.1)
CO2 SERPL-SCNC: 30 MMOL/L (ref 21–32)
CREAT SERPL-MCNC: 0.74 MG/DL (ref 0.6–1.2)
GLUCOSE SERPL-MCNC: 147 MG/DL (ref 70–99)
HCT VFR BLD CALC: 28.7 % (ref 37–47)
HGB BLD-MCNC: 9.1 G/DL (ref 12–16)
POTASSIUM SERPL-SCNC: 4.3 MMOL/L (ref 3.5–5.1)
SODIUM SERPL-SCNC: 139 MMOL/L (ref 136–145)

## 2018-08-16 RX ADMIN — Medication SCH MG: at 08:28

## 2018-08-16 RX ADMIN — INSULIN ASPART SCH EA: 100 INJECTION, SOLUTION INTRAVENOUS; SUBCUTANEOUS at 07:00

## 2018-08-16 RX ADMIN — SODIUM CHLORIDE SCH MLS/HR: 900 INJECTION, SOLUTION INTRAVENOUS at 06:25

## 2018-08-16 RX ADMIN — LEVOTHYROXINE SODIUM SCH MCG: 88 TABLET ORAL at 06:21

## 2018-08-16 RX ADMIN — PANTOPRAZOLE SCH MG: 40 TABLET, DELAYED RELEASE ORAL at 08:28

## 2018-08-16 RX ADMIN — INSULIN ASPART SCH EA: 100 INJECTION, SOLUTION INTRAVENOUS; SUBCUTANEOUS at 20:40

## 2018-08-16 RX ADMIN — Medication SCH TAB: at 08:27

## 2018-08-16 RX ADMIN — CILOSTAZOL SCH MG: 100 TABLET ORAL at 20:39

## 2018-08-16 RX ADMIN — APIXABAN SCH MG: 5 TABLET, FILM COATED ORAL at 08:29

## 2018-08-16 RX ADMIN — CEFTOLOZANE AND TAZOBACTAM SCH MLS/HR: 1; .5 INJECTION, POWDER, LYOPHILIZED, FOR SOLUTION INTRAVENOUS at 10:52

## 2018-08-16 RX ADMIN — AMLODIPINE BESYLATE SCH MG: 5 TABLET ORAL at 08:28

## 2018-08-16 RX ADMIN — INSULIN ASPART SCH EA: 100 INJECTION, SOLUTION INTRAVENOUS; SUBCUTANEOUS at 11:00

## 2018-08-16 RX ADMIN — DOCUSATE SODIUM SCH MG: 100 CAPSULE, LIQUID FILLED ORAL at 08:29

## 2018-08-16 RX ADMIN — MEMANTINE HYDROCHLORIDE SCH MG: 10 TABLET ORAL at 08:28

## 2018-08-16 RX ADMIN — Medication SCH MG: at 20:39

## 2018-08-16 RX ADMIN — INSULIN ASPART SCH EA: 100 INJECTION, SOLUTION INTRAVENOUS; SUBCUTANEOUS at 16:40

## 2018-08-16 RX ADMIN — MEMANTINE HYDROCHLORIDE SCH MG: 10 TABLET ORAL at 20:39

## 2018-08-16 RX ADMIN — INSULIN ASPART SCH UNITS: 100 INJECTION, SOLUTION INTRAVENOUS; SUBCUTANEOUS at 12:14

## 2018-08-16 RX ADMIN — CILOSTAZOL SCH MG: 100 TABLET ORAL at 08:27

## 2018-08-16 RX ADMIN — INSULIN ASPART SCH UNITS: 100 INJECTION, SOLUTION INTRAVENOUS; SUBCUTANEOUS at 16:15

## 2018-08-16 RX ADMIN — DAPTOMYCIN SCH MLS/MIN: 50 INJECTION, POWDER, LYOPHILIZED, FOR SOLUTION INTRAVENOUS at 10:53

## 2018-08-16 RX ADMIN — DONEPEZIL HYDROCHLORIDE SCH MG: 10 TABLET, FILM COATED ORAL at 08:27

## 2018-08-16 RX ADMIN — APIXABAN SCH MG: 5 TABLET, FILM COATED ORAL at 20:39

## 2018-08-16 RX ADMIN — METOPROLOL TARTRATE SCH MG: 50 TABLET, FILM COATED ORAL at 08:28

## 2018-08-16 RX ADMIN — CITALOPRAM HYDROBROMIDE SCH MG: 20 TABLET ORAL at 08:28

## 2018-08-16 RX ADMIN — METOPROLOL TARTRATE SCH MG: 50 TABLET, FILM COATED ORAL at 20:39

## 2018-08-16 RX ADMIN — CEFTOLOZANE AND TAZOBACTAM SCH MLS/HR: 1; .5 INJECTION, POWDER, LYOPHILIZED, FOR SOLUTION INTRAVENOUS at 18:02

## 2018-08-16 RX ADMIN — Medication SCH GM: at 08:28

## 2018-08-16 RX ADMIN — INSULIN ASPART SCH UNITS: 100 INJECTION, SOLUTION INTRAVENOUS; SUBCUTANEOUS at 08:34

## 2018-08-16 RX ADMIN — CEFTOLOZANE AND TAZOBACTAM SCH MLS/HR: 1; .5 INJECTION, POWDER, LYOPHILIZED, FOR SOLUTION INTRAVENOUS at 02:35

## 2018-08-16 RX ADMIN — MIRTAZAPINE SCH MG: 15 TABLET, FILM COATED ORAL at 20:38

## 2018-08-16 RX ADMIN — DOCUSATE SODIUM SCH MG: 100 CAPSULE, LIQUID FILLED ORAL at 20:39

## 2018-08-16 NOTE — CARDIOLOGY FOLLOW-UP
Subjective


Subjective


Date of Service:


Aug 16, 2018.


Pt evaluation today including:  conversation w/ patient


Additional Details:


Feeling well.  No new complaints this AM.





Successful cardioversion yesterday -- remains in sinus rhythm overnight/this AM 

on telemetry





Problem List


Medical Problems:


(1) Failure of outpatient treatment


Status: Acute  





(2) Hyperglycemia


Status: Acute  





(3) Necrosis of toe


Status: Acute  











Review of Systems


Constitutional:  No fever


Respiratory:  No cough


Cardiac:  No chest pain


Abdomen:  No pain, No nausea


Neurologic:  + memory loss


Psychiatric:  No depression symptoms


Heme:  No abnormal bleeding/bruising


Skin:  No rash





Objective


Vital Signs





Last Vital Signs Documentation








  Date Time  Temp Pulse Resp B/P (MAP) Pulse Ox O2 Delivery O2 Flow Rate FiO2


 


8/16/18 07:15 37.2 64 18 134/57 (82) 93 Room Air  


 


8/15/18 14:51       4 











Physical Exam:


General Appearance:  no apparent distress


ENT:  normal ENT inspection


Neck:  no JVD


Respiratory/Chest:  lungs clear, normal breath sounds


Cardiovascular:  regular rate, rhythm, no gallop, no murmur


Abdomen:  normal bowel sounds, non tender, soft


Extremities:  no calf tenderness, + pertinent finding (trace bilateral edema.  

RLE warm, dressing dry and intact.  No surrounding erythema)


Neurologic/Psychiatric:  alert, normal mood/affect, oriented x 3


Skin:  normal color, no rash, + pertinent finding


Lymphatic:  no adenopathy





Assessment and Plan


1. 1st toe gangrene/osteomyelitis -- post 1st/5th toe amputation; on antibiotic 

per ID


2. PAD - post SFA/popliteal PTA


3. Atrial flutter - post cardioversion; now in NSR


4. DM - on insulin pump


5. CAD post CABG





Remains in sinus rhythm post cardioversion.


RLE appears well perfused.





From a cardiac standpoint OK for discharge today. 





- Continue current metoprolol, Eliquis for PAF


- Follow-up with me in 3-4 weeks with non-invasive vascular testing.


Medications:





Current Inpatient Medications








 Medications


  (Trade)  Dose


 Ordered  Sig/Amparo


 Route  Start Time


 Stop Time Status Last Admin


Dose Admin


 


 Acetaminophen


  (Tylenol Tab)  1,000 mg  Q6  PRN


 PO  8/2/18 18:00


 9/1/18 17:59  8/8/18 22:03


1,000 MG


 


 Apixaban


  (Eliquis)  5 mg  BID


 PO  8/2/18 21:00


 9/1/18 20:59 Future hold 8/16/18 08:29


5 MG


 


 Aspirin


  (Ecotrin Tab)  81 mg  DAILY


 PO  8/3/18 09:00


 9/2/18 08:59 Future hold 8/16/18 08:28


81 MG


 


 Cilostazol


  (Pletal Tab)  100 mg  BID


 PO  8/2/18 21:00


 9/1/18 20:59 Future hold 8/16/18 08:27


100 MG


 


 Citalopram


 Hydrobromide


  (celeXA TAB)  20 mg  DAILY


 PO  8/3/18 09:00


 9/2/18 08:59  8/16/18 08:28


20 MG


 


 Docusate Sodium


  (coLACE CAP)  100 mg  BID


 PO  8/2/18 21:00


 9/1/18 20:59  8/16/18 08:29


100 MG


 


 Donepezil HCl


  (Aricept Tab)  10 mg  DAILY


 PO  8/3/18 09:00


 9/2/18 08:59  8/16/18 08:27


10 MG


 


 Levothyroxine


 Sodium


  (Synthroid Tab)  88 mcg  DAILYBB


 PO  8/3/18 06:00


 9/2/18 06:59  8/16/18 06:21


88 MCG


 


 Memantine


  (Namenda Tab)  10 mg  BID


 PO  8/2/18 21:00


 9/1/18 20:59  8/16/18 08:28


10 MG


 


 Multivitamins


  (Multivitamin


 Tab)  1 tab  DAILY


 PO  8/3/18 09:00


 9/2/18 08:59  8/16/18 08:27


1 TAB


 


 Pantoprazole


 Sodium


  (Protonix Tab)  40 mg  DAILY


 PO  8/3/18 09:00


 9/2/18 08:59  8/16/18 08:28


40 MG


 


 Mirtazapine


  (Remeron Tab)  7.5 mg  HS


 PO  8/2/18 21:00


 9/1/18 20:59  8/15/18 21:23


7.5 MG


 


 Calcium/Vitamin D


  (Caltrate Plus


 Tab)  1 tab  DAILY


 PO  8/3/18 09:00


 9/2/18 08:59  8/16/18 08:27


1 TAB


 


 Polyethylene


  (Miralax Powder


 Packet)  17 gm  DAILY


 PO  8/3/18 09:00


 9/2/18 08:59 Future hold 8/13/18 08:59


17 GM


 


 Miscellaneous


 Information


  (Consult


 Glycemic


 Management


 Pharmacy)  1 ea  UD  PRN


 N/A  8/2/18 19:42


 9/1/18 19:41   


 


 


 Insulin Aspart


  (novoLOG INSULIN


 PUMP)  1 ea  ACHS


 N/A  8/2/18 21:00


 9/1/18 20:59 Future hold 8/16/18 07:00


1 EA


 


 Insulin Aspart


  (novoLOG ASPART)  SLIDING


 SCALE  PRN  PRN


 SC  8/2/18 20:45


 9/1/18 20:44 Future hold 8/12/18 21:55


180 UNITS


 


 Glucose


  (Glucose 40% Gel)  15-30


 GRAMS 15


 GRAMS...  UD  PRN


 PO  8/2/18 20:45


 9/1/18 20:44   


 


 


 Glucose


  (Glucose Chew


 Tab)  4-8


 Tablets 4


 Tabl...  UD  PRN


 PO  8/2/18 20:45


 9/1/18 20:44   


 


 


 Glucagon


  (Glucagon Inj)  1 mg  UD  PRN


 IM  8/2/18 20:45


 9/1/18 20:44   


 


 


 Dextrose


  (Dextrose 50%


 50ML Syringe)  25-50ML


 25ML FOR


 ...  UD  PRN


 IV  8/2/18 20:45


 9/1/18 20:44   


 


 


 Carbohydrates


  (Carbohydrates


 For Hypoglycemia)  15-30 GRAMS


 15 grams if


 BSG 54-69...  UD  PRN


 PO  8/2/18 20:45


 9/1/18 20:44  8/14/18 15:55


30 GM


 


 Diphenhydramine


 HCl


  (Benadryl Cap)  25 mg  BID  PRN


 PO  8/3/18 16:30


 9/2/18 16:29   


 


 


 Insulin Aspart


  (novoLOG ASPART)  **SLIDING


 SCALE**


 **G...  AC


 SC  8/6/18 18:00


 9/5/18 17:59  8/16/18 08:34


8 UNITS


 


 Morphine Sulfate


  (MoRPHine


 SULFATE INJ)  3 mg  Q3HWA  PRN


 IV  8/8/18 00:00


 8/22/18 00:00  8/8/18 23:43


3 MG


 


 Tramadol HCl


  (Ultram Tab)  50 mg  Q6H  PRN


 PO  8/8/18 08:00


 9/7/18 07:59  8/9/18 21:36


50 MG


 


 Amlodipine


 Besylate


  (Norvasc Tab)  5 mg  DAILY


 PO  8/9/18 09:00


 9/2/18 08:59  8/16/18 08:28


5 MG


 


 Magnesium Oxide


  (Mag-Ox Tab)  400 mg  BID


 PO  8/13/18 09:00


 9/12/18 08:59  8/16/18 08:28


400 MG


 


 Sodium Chloride  1,000 ml @ 


 50 mls/hr  Q20H


 IV  8/13/18 15:45


 9/12/18 15:44  8/16/18 06:25


50 MLS/HR


 


 Daptomycin 400 mg/


 Syringe  8 ml @ 4


 mls/min  Q24H


 IV  8/14/18 10:00


 8/18/18 10:01  8/15/18 09:45


4 MLS/MIN


 


 Ceftolozane/


 Tazobactam 1.5 gm/


 Dextrose  111.4 ml @ 


 111.4 mls/


 hr  Q8H


 IV  8/14/18 10:00


 8/18/18 23:59  8/16/18 02:35


111.4 MLS/HR


 


 Metoprolol


 Tartrate


  (Lopressor Tab)  50 mg  BID


 PO  8/14/18 21:00


 9/1/18 20:59  8/16/18 08:28


50 MG








Lab Results:


8/16/18 06:10








8/16/18 06:10

















Test


  8/16/18


06:10 8/16/18


07:25


 


Anion Gap


  3.0 mmol/L


(3-11) 


 


 


Est Creatinine Clear Calc


Drug Dose 67.0 ml/min 


  


 


 


Estimated GFR (


American) 91.9 


  


 


 


Estimated GFR (Non-


American 79.3 


  


 


 


BUN/Creatinine Ratio 33.6 (10-20)  


 


Calcium Level


  8.3 mg/dl


(8.5-10.1) 


 


 


Magnesium Level


  2.0 mg/dl


(1.8-2.4) 


 


 


Bedside Glucose


  


  138 mg/dl


(70-90)

## 2018-08-16 NOTE — PROGRESS NOTE
Internal Med Progress Note


Date of Service:


Aug 16, 2018.


Provider Documentation:





SUBJECTIVE:





Seen and examined at bedside


Doing well today


No complaints


Had successful Cardioversion yesterday


Denies chest pain, SOB, dizziness, palpitations


Also denies any leg pain


No other complaints 





OBJECTIVE:





Vital Signs-as noted below





Physical Exam:


General Appearance:Thin, no apparent distress


Head:  normocephalic, Atraumatic 


Eyes:  normal inspection, EOMI, PERRL


Neck:  supple, Trachea midline


Respiratory/Chest: Normal breath sounds, CTA


Cardiovascular: S1, s2, No murmur


Abdomen/GI:Soft, Non tender, Bowel sounds present


Extremities/Musculoskelatal:normal inspection, no edema, R foot in dressing  


Neurologic/Psych:AAOX3, grossly no focal neurological deficits 


Skin:  normal color, warm





Lab data as noted below.


ASSESSMENT & PLAN:








Cellulitis Right Foot/ Gangrene Right Great Toe, 5th Toe


S/P Right great toe partial amputation; Removal of fifth toe nail plate and 

debridement of of the fifth toe nail bed & right heel


Appreciate Orthopedics Help 


Recommendations per Ortho:


   Daily dressing changes to the right foot.  Keep an eye on her heel as well. (

breakdown?)


   Weightbearing on the heel only with protective shoe.


   Ok to shower if you keep the dressing covered with a waterproof covering.


   Call if you have increased temp of 101.5 or greater, increased redness, 

increased swelling or drainage.  


   Follow up with Dr Moseley in 7 days.  Call for an appointment.  





Outpatient cultures grew MRSA and Pseudomonas.


Continue Dapto + Ceftolozane/Tazobactam Day 12


Appreciate ID Input Dr. Guevara


Needs 2 more days of Abx


Endurance IV line placed


Follow up with ID Clinic in [facility] c/o Dr Stone in 1 week








CAD


No anginal symptoms.


Continue aspirin, metoprolol, amlodipine.








Atrial Flutter S/P Successful Cardioversion


H/O Afib  


Continue Eliquis


Metoprolol dose increased to 50mg BID for better rate control  


Appreciate Cardiology Input


Needs follow up with Dr.Christopher Levy in 3-4 weeks











Pulmonary Nodules, Right Middle Lobe Infiltrate


no respiratory symptoms


per patient's daughter Rupinder, patient is following with [facility] in [facility]  


Pulmonary consulted, recommend outpatient follow up


 





HTN:


Increased Amlodipine from 2.5 to 5mg po daily


Continue metoprolol


monitor








Peripheral Vascular Disease


Interventional Cardiology consulted


continue Pletal








DM TYPE 1


Insulin pump.


Pharmacy consulted


BSGs elevated


Novolog Sliding scale being adjusted


continue to monitor for Hypoglycemic episodes








Dyslipidemia


Hold statin while receiving daptomycin.








Dementia


Monitor for delirium.








MRSA


Contact precautions.








DVT Px:


on Eliquis 


 





Code Status:


DNR





Disposition:


Plan to discharge to [facility] as able


 


Follow up with Dr Moseley in 7 days.  Call for an appointment.  [phone]


Follow up with ID Clinic in [facility] c/o Dr Stone in 1 week


Family Medicine follow-up with Dr. Mallory in 1 week


Follow up with Cardiology Dr.Christopher Levy in 3-4 weeks





Seek immediate medical attention if your symptoms reoccur or worsen 








Consultants:


Ortho Dr. Moseley, ID Dr. Guevara





Procedures:


s/p Surgery by Dr. Moseley 8/7/18


   1.  Right great toe partial amputation to the level of the mid proximal


   phalanx.


   2.  Removal of fifth toe nail plate.


   3.  Irrigation and debridement of the fifth toe nail bed.


   4.  Irrigation and debridement of right heel including skin, subcutaneous


   fat, and fascia.


Vital Signs:











  Date Time  Temp Pulse Resp B/P (MAP) Pulse Ox O2 Delivery O2 Flow Rate FiO2


 


8/16/18 08:00      Room Air  


 


8/16/18 07:15 37.2 64 18 134/57 (82) 93 Room Air  


 


8/16/18 03:51 37.0 71 18 137/64 (88) 90 Room Air  


 


8/16/18 00:00      Room Air  


 


8/15/18 23:25 36.9 66 18 122/63 (82) 90 Room Air  


 


8/15/18 19:12 36.6 70 18 135/65 (88) 97 Room Air  


 


8/15/18 16:00      Room Air  


 


8/15/18 15:36 36.8 61 18 155/65 (95) 96 Room Air  


 


8/15/18 15:11  64 16 128/50 (76) 98 Room Air  


 


8/15/18 15:01  62 16 150/69 (96) 98 Room Air  


 


8/15/18 14:51  55 16 149/63 96 Nasal Cannula 4 


 


8/15/18 14:50  55 16 149/63 96 Nasal Cannula 4 


 


8/15/18 14:45  53 16 136/59 96 Nasal Cannula 4 


 


8/15/18 14:43  105 16 147/79 96 Nasal Cannula 4 








Lab Results:





Results Past 24 Hours








Test


  8/15/18


16:04 8/15/18


20:39 8/16/18


00:15 8/16/18


03:50 Range/Units


 


 


Bedside Glucose 78 280 233 187 70-90  mg/dl


 


Test


  8/16/18


06:10 8/16/18


07:25 8/16/18


11:13 


  Range/Units


 


 


Hemoglobin 9.1    12.0-16.0  g/dL


 


Hematocrit 28.7    37-47  %


 


Sodium Level 139    136-145  mmol/L


 


Potassium Level 4.3    3.5-5.1  mmol/L


 


Chloride Level 107      mmol/L


 


Carbon Dioxide Level 30    21-32  mmol/L


 


Anion Gap 3.0    3-11  mmol/L


 


Blood Urea Nitrogen 25    7-18  mg/dl


 


Creatinine


  0.74


  


  


  


  0.60-1.20


mg/dl


 


Est Creatinine Clear Calc


Drug Dose 67.0


  


  


  


   ml/min


 


 


Estimated GFR (


American) 91.9


  


  


  


   


 


 


Estimated GFR (Non-


American 79.3


  


  


  


   


 


 


BUN/Creatinine Ratio 33.6    10-20  


 


Random Glucose 147    70-99  mg/dl


 


Calcium Level 8.3    8.5-10.1  mg/dl


 


Magnesium Level 2.0    1.8-2.4  mg/dl


 


Bedside Glucose  138 178  70-90  mg/dl

## 2018-08-17 VITALS
SYSTOLIC BLOOD PRESSURE: 149 MMHG | TEMPERATURE: 98.96 F | OXYGEN SATURATION: 90 % | DIASTOLIC BLOOD PRESSURE: 55 MMHG | HEART RATE: 67 BPM

## 2018-08-17 VITALS
DIASTOLIC BLOOD PRESSURE: 61 MMHG | OXYGEN SATURATION: 92 % | SYSTOLIC BLOOD PRESSURE: 150 MMHG | TEMPERATURE: 99.86 F | HEART RATE: 64 BPM

## 2018-08-17 VITALS
OXYGEN SATURATION: 95 % | SYSTOLIC BLOOD PRESSURE: 161 MMHG | DIASTOLIC BLOOD PRESSURE: 63 MMHG | HEART RATE: 63 BPM | TEMPERATURE: 97.7 F

## 2018-08-17 VITALS
OXYGEN SATURATION: 92 % | HEART RATE: 63 BPM | SYSTOLIC BLOOD PRESSURE: 142 MMHG | TEMPERATURE: 98.6 F | DIASTOLIC BLOOD PRESSURE: 81 MMHG

## 2018-08-17 VITALS
OXYGEN SATURATION: 90 % | TEMPERATURE: 98.42 F | HEART RATE: 71 BPM | DIASTOLIC BLOOD PRESSURE: 63 MMHG | SYSTOLIC BLOOD PRESSURE: 150 MMHG

## 2018-08-17 LAB
BUN SERPL-MCNC: 21 MG/DL (ref 7–18)
CALCIUM SERPL-MCNC: 8.3 MG/DL (ref 8.5–10.1)
CO2 SERPL-SCNC: 29 MMOL/L (ref 21–32)
CREAT SERPL-MCNC: 0.83 MG/DL (ref 0.6–1.2)
GLUCOSE SERPL-MCNC: 181 MG/DL (ref 70–99)
POTASSIUM SERPL-SCNC: 4.1 MMOL/L (ref 3.5–5.1)
SODIUM SERPL-SCNC: 138 MMOL/L (ref 136–145)

## 2018-08-17 RX ADMIN — Medication SCH GM: at 08:52

## 2018-08-17 RX ADMIN — APIXABAN SCH MG: 5 TABLET, FILM COATED ORAL at 08:32

## 2018-08-17 RX ADMIN — INSULIN ASPART SCH UNITS: 100 INJECTION, SOLUTION INTRAVENOUS; SUBCUTANEOUS at 12:26

## 2018-08-17 RX ADMIN — Medication SCH MG: at 20:08

## 2018-08-17 RX ADMIN — INSULIN ASPART SCH EA: 100 INJECTION, SOLUTION INTRAVENOUS; SUBCUTANEOUS at 08:52

## 2018-08-17 RX ADMIN — DONEPEZIL HYDROCHLORIDE SCH MG: 10 TABLET, FILM COATED ORAL at 08:31

## 2018-08-17 RX ADMIN — Medication SCH TAB: at 08:31

## 2018-08-17 RX ADMIN — METOPROLOL TARTRATE SCH MG: 50 TABLET, FILM COATED ORAL at 20:05

## 2018-08-17 RX ADMIN — INSULIN ASPART SCH UNITS: 100 INJECTION, SOLUTION INTRAVENOUS; SUBCUTANEOUS at 08:44

## 2018-08-17 RX ADMIN — METOPROLOL TARTRATE SCH MG: 50 TABLET, FILM COATED ORAL at 08:33

## 2018-08-17 RX ADMIN — LEVOTHYROXINE SODIUM SCH MCG: 88 TABLET ORAL at 05:37

## 2018-08-17 RX ADMIN — CILOSTAZOL SCH MG: 100 TABLET ORAL at 08:32

## 2018-08-17 RX ADMIN — INSULIN ASPART SCH EA: 100 INJECTION, SOLUTION INTRAVENOUS; SUBCUTANEOUS at 20:03

## 2018-08-17 RX ADMIN — DOCUSATE SODIUM SCH MG: 100 CAPSULE, LIQUID FILLED ORAL at 20:09

## 2018-08-17 RX ADMIN — INSULIN ASPART SCH EA: 100 INJECTION, SOLUTION INTRAVENOUS; SUBCUTANEOUS at 16:42

## 2018-08-17 RX ADMIN — CEFTOLOZANE AND TAZOBACTAM SCH MLS/HR: 1; .5 INJECTION, POWDER, LYOPHILIZED, FOR SOLUTION INTRAVENOUS at 01:42

## 2018-08-17 RX ADMIN — AMLODIPINE BESYLATE SCH MG: 5 TABLET ORAL at 08:31

## 2018-08-17 RX ADMIN — MEMANTINE HYDROCHLORIDE SCH MG: 10 TABLET ORAL at 08:32

## 2018-08-17 RX ADMIN — INSULIN ASPART PRN UNITS: 100 INJECTION, SOLUTION INTRAVENOUS; SUBCUTANEOUS at 16:37

## 2018-08-17 RX ADMIN — INSULIN ASPART SCH UNITS: 100 INJECTION, SOLUTION INTRAVENOUS; SUBCUTANEOUS at 17:27

## 2018-08-17 RX ADMIN — MIRTAZAPINE SCH MG: 15 TABLET, FILM COATED ORAL at 20:07

## 2018-08-17 RX ADMIN — INSULIN ASPART SCH EA: 100 INJECTION, SOLUTION INTRAVENOUS; SUBCUTANEOUS at 12:27

## 2018-08-17 RX ADMIN — MEMANTINE HYDROCHLORIDE SCH MG: 10 TABLET ORAL at 20:06

## 2018-08-17 RX ADMIN — DOCUSATE SODIUM SCH MG: 100 CAPSULE, LIQUID FILLED ORAL at 08:31

## 2018-08-17 RX ADMIN — APIXABAN SCH MG: 5 TABLET, FILM COATED ORAL at 20:05

## 2018-08-17 RX ADMIN — Medication SCH MG: at 08:31

## 2018-08-17 RX ADMIN — CILOSTAZOL SCH MG: 100 TABLET ORAL at 20:06

## 2018-08-17 RX ADMIN — CEFTOLOZANE AND TAZOBACTAM SCH MLS/HR: 1; .5 INJECTION, POWDER, LYOPHILIZED, FOR SOLUTION INTRAVENOUS at 17:57

## 2018-08-17 RX ADMIN — CITALOPRAM HYDROBROMIDE SCH MG: 20 TABLET ORAL at 08:31

## 2018-08-17 RX ADMIN — PANTOPRAZOLE SCH MG: 40 TABLET, DELAYED RELEASE ORAL at 08:31

## 2018-08-17 RX ADMIN — DAPTOMYCIN SCH MLS/MIN: 50 INJECTION, POWDER, LYOPHILIZED, FOR SOLUTION INTRAVENOUS at 08:52

## 2018-08-17 RX ADMIN — Medication SCH MG: at 08:32

## 2018-08-17 RX ADMIN — CEFTOLOZANE AND TAZOBACTAM SCH MLS/HR: 1; .5 INJECTION, POWDER, LYOPHILIZED, FOR SOLUTION INTRAVENOUS at 08:52

## 2018-08-17 NOTE — PROGRESS NOTE
Internal Med Progress Note


Date of Service:


Aug 17, 2018.


Provider Documentation:





SUBJECTIVE:





Seen and examined at bedside


No new complaints


Eager to get discharged


Denies chest pain, SOB, dizziness, palpitations


Also denies any leg pain


No other complaints 





OBJECTIVE:





Vital Signs-as noted below





Physical Exam:


General Appearance:Thin, no apparent distress


Head:  normocephalic, Atraumatic 


Eyes:  normal inspection, EOMI, PERRL


Neck:  supple, Trachea midline


Respiratory/Chest: Normal breath sounds, CTA


Cardiovascular: S1, s2, No murmur


Abdomen/GI:Soft, Non tender, Bowel sounds present


Extremities/Musculoskelatal:normal inspection, no edema, R foot in dressing  


Neurologic/Psych:AAOX3, grossly no focal neurological deficits 


Skin:  normal color, warm





Lab data as noted below.


ASSESSMENT & PLAN:








Cellulitis Right Foot/ Gangrene Right Great Toe, 5th Toe


S/P Right great toe partial amputation; Removal of fifth toe nail plate and 

debridement of of the fifth toe nail bed & right heel


Appreciate Orthopedics Help 


Recommendations per Ortho:


   Daily dressing changes to the right foot.  Keep an eye on her heel as well. (

breakdown?)


   Weightbearing on the heel only with protective shoe.


   Ok to shower if you keep the dressing covered with a waterproof covering.


   Call if you have increased temp of 101.5 or greater, increased redness, 

increased swelling or drainage.  


   Follow up with Dr Moseley in 7 days.  Call for an appointment.  





Outpatient cultures grew MRSA and Pseudomonas.


Continue Dapto + Ceftolozane/Tazobactam Day 13/14


Appreciate ID Input Dr. Guevara


Follow up with ID Clinic in [facility] c/o Dr Stone in 1 week


Patient to return home after completion of Antibiotics tomorrow 








CAD


No anginal symptoms.


Continue aspirin, metoprolol, amlodipine.








Atrial Flutter S/P Successful Cardioversion


H/O Afib  


Continue Eliquis


Metoprolol dose increased to 50mg BID for better rate control  


Appreciate Cardiology Input


Needs follow up with Dr.Christopher Levy in 3-4 weeks











Pulmonary Nodules, Right Middle Lobe Infiltrate


no respiratory symptoms


per patient's daughter Rupinder, patient is following with [facility] in [facility]  


Pulmonary consulted, recommend outpatient follow up


 





HTN:


Increased Amlodipine from 2.5 to 5mg po daily


Continue metoprolol


monitor








Peripheral Vascular Disease


Interventional Cardiology consulted


continue Pletal








DM TYPE 1


Insulin pump.


Pharmacy consulted


BSGs elevated


Novolog Sliding scale being adjusted


continue to monitor for Hypoglycemic episodes








Dyslipidemia


Hold statin while receiving daptomycin.








Dementia


Monitor for delirium.








MRSA


Contact precautions.








DVT Px:


on Eliquis 


 





Code Status:


DNR





Disposition:


Plan to discharge to Home with Home Health tomorrow if stable


 


Follow up with Dr Moseley in 7 days.  Call for an appointment.  [phone]


Follow up with ID Clinic in [facility] c/o Dr Stone in 1 week


Family Medicine follow-up with Dr. Mallory in 1 week


Follow up with Cardiology Dr.Christopher Levy in 3-4 weeks








Consultants:


Ortho Dr. Moseley, ID Dr. Guevara





Procedures:


s/p Surgery by Dr. Moseley 8/7/18


   1.  Right great toe partial amputation to the level of the mid proximal


   phalanx.


   2.  Removal of fifth toe nail plate.


   3.  Irrigation and debridement of the fifth toe nail bed.


   4.  Irrigation and debridement of right heel including skin, subcutaneous


   fat, and fascia.


Vital Signs:











  Date Time  Temp Pulse Resp B/P (MAP) Pulse Ox O2 Delivery O2 Flow Rate FiO2


 


8/17/18 08:00      Room Air  


 


8/17/18 07:09 37.2 67 18 149/55 (86) 90 Room Air  


 


8/17/18 03:31 36.9 71 18 150/63 (92) 90 Room Air  


 


8/17/18 00:00      Room Air  


 


8/16/18 23:46 36.9 60 17 157/63 (94) 91 Room Air  


 


8/16/18 20:25 36.5 62 20 146/66 (92) 96 Room Air  


 


8/16/18 16:00      Room Air  


 


8/16/18 15:37 36.7 61 20 137/57 (83) 95 Room Air  


 


8/16/18 11:54 37.3 63 18 146/64 (91) 93 Room Air  








Lab Results:





Results Past 24 Hours








Test


  8/16/18


11:13 8/16/18


16:37 8/16/18


20:44 8/17/18


00:08 Range/Units


 


 


Bedside Glucose 178 63 210 200 70-90  mg/dl


 


Test


  8/17/18


04:01 8/17/18


06:01 


  


  Range/Units


 


 


Bedside Glucose 199    70-90  mg/dl


 


Sodium Level  138   136-145  mmol/L


 


Potassium Level  4.1   3.5-5.1  mmol/L


 


Chloride Level  104     mmol/L


 


Carbon Dioxide Level  29   21-32  mmol/L


 


Anion Gap  5.0   3-11  mmol/L


 


Blood Urea Nitrogen  21   7-18  mg/dl


 


Creatinine


  


  0.83


  


  


  0.60-1.20


mg/dl


 


Est Creatinine Clear Calc


Drug Dose 


  59.6


  


  


   ml/min


 


 


Estimated GFR (


American) 


  79.9


  


  


   


 


 


Estimated GFR (Non-


American 


  69.0


  


  


   


 


 


BUN/Creatinine Ratio  24.9   10-20  


 


Random Glucose  181   70-99  mg/dl


 


Calcium Level  8.3   8.5-10.1  mg/dl


 


Magnesium Level  1.9   1.8-2.4  mg/dl

## 2018-08-18 VITALS
DIASTOLIC BLOOD PRESSURE: 57 MMHG | OXYGEN SATURATION: 95 % | SYSTOLIC BLOOD PRESSURE: 154 MMHG | TEMPERATURE: 98.24 F | HEART RATE: 58 BPM

## 2018-08-18 VITALS
OXYGEN SATURATION: 94 % | DIASTOLIC BLOOD PRESSURE: 76 MMHG | SYSTOLIC BLOOD PRESSURE: 147 MMHG | TEMPERATURE: 98.42 F | HEART RATE: 60 BPM

## 2018-08-18 VITALS
OXYGEN SATURATION: 95 % | SYSTOLIC BLOOD PRESSURE: 144 MMHG | TEMPERATURE: 98.24 F | HEART RATE: 64 BPM | DIASTOLIC BLOOD PRESSURE: 67 MMHG

## 2018-08-18 VITALS
TEMPERATURE: 98.6 F | DIASTOLIC BLOOD PRESSURE: 62 MMHG | SYSTOLIC BLOOD PRESSURE: 157 MMHG | HEART RATE: 57 BPM | OXYGEN SATURATION: 96 %

## 2018-08-18 VITALS
TEMPERATURE: 98.06 F | HEART RATE: 63 BPM | SYSTOLIC BLOOD PRESSURE: 175 MMHG | OXYGEN SATURATION: 93 % | DIASTOLIC BLOOD PRESSURE: 64 MMHG

## 2018-08-18 VITALS — OXYGEN SATURATION: 83 %

## 2018-08-18 VITALS
DIASTOLIC BLOOD PRESSURE: 60 MMHG | TEMPERATURE: 98.24 F | SYSTOLIC BLOOD PRESSURE: 130 MMHG | HEART RATE: 60 BPM | OXYGEN SATURATION: 93 %

## 2018-08-18 VITALS — OXYGEN SATURATION: 87 %

## 2018-08-18 VITALS
OXYGEN SATURATION: 83 % | TEMPERATURE: 98.6 F | DIASTOLIC BLOOD PRESSURE: 58 MMHG | HEART RATE: 65 BPM | SYSTOLIC BLOOD PRESSURE: 159 MMHG

## 2018-08-18 VITALS — OXYGEN SATURATION: 95 %

## 2018-08-18 LAB
BUN SERPL-MCNC: 24 MG/DL (ref 7–18)
CALCIUM SERPL-MCNC: 8.3 MG/DL (ref 8.5–10.1)
CO2 SERPL-SCNC: 30 MMOL/L (ref 21–32)
CREAT SERPL-MCNC: 0.83 MG/DL (ref 0.6–1.2)
GLUCOSE SERPL-MCNC: 217 MG/DL (ref 70–99)
POTASSIUM SERPL-SCNC: 4.2 MMOL/L (ref 3.5–5.1)
SODIUM SERPL-SCNC: 137 MMOL/L (ref 136–145)

## 2018-08-18 RX ADMIN — DOCUSATE SODIUM SCH MG: 100 CAPSULE, LIQUID FILLED ORAL at 22:35

## 2018-08-18 RX ADMIN — INSULIN ASPART SCH EA: 100 INJECTION, SOLUTION INTRAVENOUS; SUBCUTANEOUS at 11:02

## 2018-08-18 RX ADMIN — DOCUSATE SODIUM SCH MG: 100 CAPSULE, LIQUID FILLED ORAL at 08:49

## 2018-08-18 RX ADMIN — DAPTOMYCIN SCH MLS/MIN: 50 INJECTION, POWDER, LYOPHILIZED, FOR SOLUTION INTRAVENOUS at 11:02

## 2018-08-18 RX ADMIN — DONEPEZIL HYDROCHLORIDE SCH MG: 10 TABLET, FILM COATED ORAL at 08:48

## 2018-08-18 RX ADMIN — AMLODIPINE BESYLATE SCH MG: 5 TABLET ORAL at 08:49

## 2018-08-18 RX ADMIN — Medication SCH TAB: at 08:48

## 2018-08-18 RX ADMIN — Medication SCH MG: at 08:48

## 2018-08-18 RX ADMIN — LEVOTHYROXINE SODIUM SCH MCG: 88 TABLET ORAL at 06:16

## 2018-08-18 RX ADMIN — Medication SCH GM: at 08:49

## 2018-08-18 RX ADMIN — INSULIN ASPART SCH UNITS: 100 INJECTION, SOLUTION INTRAVENOUS; SUBCUTANEOUS at 08:52

## 2018-08-18 RX ADMIN — APIXABAN SCH MG: 5 TABLET, FILM COATED ORAL at 08:48

## 2018-08-18 RX ADMIN — Medication SCH TAB: at 08:49

## 2018-08-18 RX ADMIN — CITALOPRAM HYDROBROMIDE SCH MG: 20 TABLET ORAL at 08:49

## 2018-08-18 RX ADMIN — CEFTOLOZANE AND TAZOBACTAM SCH MLS/HR: 1; .5 INJECTION, POWDER, LYOPHILIZED, FOR SOLUTION INTRAVENOUS at 18:32

## 2018-08-18 RX ADMIN — CILOSTAZOL SCH MG: 100 TABLET ORAL at 22:35

## 2018-08-18 RX ADMIN — METOPROLOL TARTRATE SCH MG: 50 TABLET, FILM COATED ORAL at 22:36

## 2018-08-18 RX ADMIN — ACETAMINOPHEN PRN MG: 500 TABLET, FILM COATED ORAL at 23:49

## 2018-08-18 RX ADMIN — INSULIN ASPART SCH EA: 100 INJECTION, SOLUTION INTRAVENOUS; SUBCUTANEOUS at 22:38

## 2018-08-18 RX ADMIN — INSULIN ASPART SCH UNITS: 100 INJECTION, SOLUTION INTRAVENOUS; SUBCUTANEOUS at 12:17

## 2018-08-18 RX ADMIN — CEFTOLOZANE AND TAZOBACTAM SCH MLS/HR: 1; .5 INJECTION, POWDER, LYOPHILIZED, FOR SOLUTION INTRAVENOUS at 02:30

## 2018-08-18 RX ADMIN — MEMANTINE HYDROCHLORIDE SCH MG: 10 TABLET ORAL at 22:36

## 2018-08-18 RX ADMIN — PANTOPRAZOLE SCH MG: 40 TABLET, DELAYED RELEASE ORAL at 08:48

## 2018-08-18 RX ADMIN — INSULIN ASPART SCH EA: 100 INJECTION, SOLUTION INTRAVENOUS; SUBCUTANEOUS at 08:53

## 2018-08-18 RX ADMIN — CEFTOLOZANE AND TAZOBACTAM SCH MLS/HR: 1; .5 INJECTION, POWDER, LYOPHILIZED, FOR SOLUTION INTRAVENOUS at 11:01

## 2018-08-18 RX ADMIN — APIXABAN SCH MG: 5 TABLET, FILM COATED ORAL at 22:36

## 2018-08-18 RX ADMIN — CILOSTAZOL SCH MG: 100 TABLET ORAL at 08:48

## 2018-08-18 RX ADMIN — MIRTAZAPINE SCH MG: 15 TABLET, FILM COATED ORAL at 22:38

## 2018-08-18 RX ADMIN — Medication SCH MG: at 22:36

## 2018-08-18 RX ADMIN — INSULIN ASPART SCH EA: 100 INJECTION, SOLUTION INTRAVENOUS; SUBCUTANEOUS at 18:10

## 2018-08-18 RX ADMIN — INSULIN ASPART SCH UNITS: 100 INJECTION, SOLUTION INTRAVENOUS; SUBCUTANEOUS at 18:09

## 2018-08-18 RX ADMIN — MEMANTINE HYDROCHLORIDE SCH MG: 10 TABLET ORAL at 08:48

## 2018-08-18 RX ADMIN — METOPROLOL TARTRATE SCH MG: 50 TABLET, FILM COATED ORAL at 08:48

## 2018-08-18 NOTE — DISCHARGE INSTRUCTIONS
Discharge Instructions


Date of Service


Aug 18, 2018.





Admission


Reason for Admission:  Cellulitis Of R Foot, Chronic Ulcer Of Great R Toe





Discharge


Discharge Diagnosis / Problem:   OSTEOMYELITIS OF RT GREAT TOE /5TH TOE S/P 

AMPUTATION /AFLUTTER





Discharge Goals


Goal(s):  Decrease discomfort, Improve function, Increase independence, Improve 

disease control, Diagnostic testing, Therapeutic intervention





Activity Recommendations


Activity Limitations:  as noted below





.





Instructions / Follow-Up


Instructions / Follow-Up


HOSPITAL FOLLOW UP : 8/22/2018 1:00 PM Dr Jhonathan Mallory MD [facility] [place]





CONTINUE TO USE 2 L OXYGEN AT NIGHT 


WILL NEED REFERRAL FOR SLEEP STUDY TO ASSESS FOR OBSTRUCTIVE SLEEP APNEA /CPAP 








CT CHEST WITH CONTRAST 





1. Right middle lobe and lingular opacities. The findings may reflect an


infectious etiology such as atypical mycobacterial infection. A 2.3 x 1.4 cm


nodular right middle lobe opacity is indeterminate. Although an


infectious/inflammatory process is favored, a neoplastic process could appear


similar and a follow-up chest CT in one month is recommended. Alternately,


bronchoscopy could be performed.





2. Numerous ill-defined nodules throughout the lungs. An infectious/inflammatory


process is favored however metastatic disease could appear similar. These


nodules should be assessed on follow-up chest CT.








INCIDENTAL FINDING OF PULMONARY NODULE 





REPEAT CT CHEST WITH CONTRAST IN 1 MONTHS 





FOLLOW UP WITH PULMONOLOGY AT [facility] 





FOLLOW UP WITH DR. MOSELEY IN 7 DAYS.  CALL FOR AN APPOINTMENT.  [phone]


FOLLOW UP WITH ID CLINIC IN [facility] WITH  DR. BLANCA IN 1 WEEK


FOLLOW UP WITH CARDIOLOGY DR.CHRISTOPHER MOBLEY IN 3-4 WEEKS





Current Hospital Diet


Patient's current hospital diet: AHA Diet (Heart Healthy), Diabetes Type 2 Diet





Discharge Diet


Recommended Diet:  AHA Diet (Heart Healthy), Diabetes Type 1 Diet





Procedures


Procedures Performed:  


1. Debridement of nail bed of right 5th toe; 


2. Partial Amputation of right great toe; 


3. Irrigation and Debridement of right heel ulcer including skin,


subcutaneous fat


& fascia


4. Removal nail plate 5th toe





Pending Studies


Studies pending at discharge:  no





Laboratory Results





Hemoglobin A1c








Test


  8/2/18


15:45 Range/Units


 


 


Estimated Average Glucose 217   mg/dl


 


Hemoglobin A1c 9.2 H 4.5-5.6  %











Medical Emergencies








.


Who to Call and When:





Medical Emergencies:  If at any time you feel your situation is an emergency, 

please call 911 immediately.





.





Non-Emergent Contact


Non-Emergency issues call your:  Primary Care Provider





.


.








"Provider Documentation" section prepared by Isa Lizarraga.








.





Consultant Recommendations


Consultant Recommendations:


Daily dressing changes to the right foot.  Keep an eye on her heel as well. (

breakdown?)


Weightbearing on the heel only with protective shoe


Ok to shower if you keep the dressing covered with a waterproof covering


Call if you have increased temp of 101.5 or greater, increased redness, 

increased swelling or drainage.  [phone]








Follow up with Dr Moseley in 14 days from the day of surgery.  Call for an 

appointment.  [phone]

## 2018-08-18 NOTE — PROGRESS NOTE
Internal Med Progress Note


Date of Service:


Aug 18, 2018.


Provider Documentation:





SUBJECTIVE:





No complaint of pain or discomfort,


No fever or chills


Completed IV antibiotic dose today


Eager to be discharged home








OBJECTIVE:





Vital Signs-as noted below





Exam:


General-very pleasant, elderly female, no sign of distress


Eyes-sclera nonicteric, pupils bilateral equal reactive light extraocular 

muscle intact


ENT-moist oral mucosa, hearing grossly normal


Neck-supple, no JVD, no carotid bruit, no thalamic


Lungs-clear to auscultate, no wheeze or rales


Heart-regular S1 and S2 no murmur gallop


Abdomen-soft nontender, no organomegaly


Extremities-status post right great toe amputation, dressing/protective boot  

present, no drainage noted, 


Neuro-alert awake oriented 3, no focal neurological deficit





Lab data as noted below.


ASSESSMENT & PLAN:





CELLULITIS OF RIGHT FOOT/OSTEOMYELITIS GANGRENE OF RIGHT GREAT TOE/RT FIFTH TOE 





S/P Right great toe partial amputation; Removal of fifth toe nail plate and 

debridement of of the fifth toe nail bed & right heel


 by Dr. Moseley on 8/7/18


Appreciate Orthopedics Help 


Recommendations per Ortho:


   Daily dressing changes to the right foot.  Keep an eye on her heel as well-

for possible skin breakdown


   Weightbearing on the heel only with protective shoe.


   Ok to shower if you keep the dressing covered with a waterproof covering.


   Call if you have increased temp of 101.5 or greater, increased redness, 

increased swelling or drainage.  


   Follow up with Dr Moseley in 7 days.  Call for an appointment.  





Outpatient cultures grew MRSA and Pseudomonas.


Completed IV Dapto + Ceftolozane/Tazobactam 14 days treatment


Appreciate ID Input Dr. Guevara


Follow up with ID Clinic in [facility] with Dr. Stone in 1 week from 

discharge





CAD S/P CABG 


No anginal symptoms.


Continue aspirin, metoprolol, amlodipine.








ATRIAL FLUTTER S/P SUCCESSFUL CARDIOVERSION


Continue Eliquis for paroxysmal afib 


Metoprolol dose increased to 50mg BID for better rate control  


Appreciate Cardiology Input


Needs follow up with Dr.Christopher Levy in 3-4 weeks











PULMONARY NODULES, RIGHT MIDDLE LOBE INFILTRATE


no respiratory symptoms


per patient's daughter Rupinder, patient is following with [facility] in [facility]  


Pulmonary consulted, recommend outpatient follow up


 





HTN:


Blood pressure stable


 Amlodipine dose increased from 2.5 to 5mg po daily


Continue metoprolol


monitor








PERIPHERAL VASCULAR DISEASE


Interventional Cardiology consulted


S/P  -  SFA/popliteal PTA


continue Pletal


out pt follow up with Dr Levy 








DM TYPE 1


on Insulin pump.


Pharmacy consulted for glycemic management


BSGs elevated


Novolog Sliding scale being adjusted


continue to monitor for Hypoglycemic episodes








DYSLIPIDEMIA


Was kept on hold during treatment with daptomycin


Will resume statin  as daptomycin is discontinued











DVT Px:


on Eliquis 


 





Code Status:


DNR








EPISODES OF DESATURATION AT NIGHT/POSSIBLE OBSTRUCTIVE SLEEP APNEA


-Patient was reportedly found to be hypoxic 80% at night


-Required 2 L oxygen by nasal cannula


-Daytime patient remains in room air with 95% oxygenation


-Possible's sleep apnea


-Ordered for nocturnal pulse oximetry.  May need home oxygen at night


-Will benefit with outpatient sleep study to assess possible sleep apnea, and 

treatment with CPAP











DISPOSITION


Expected to be discharged home with home health tomorrow 8/19/2018


Nocturnal pulse oximetry ordered to assess requirement for nighttime oxygenation





Update given to daughter Rupinder over phone





Follow up with Dr Moseley in 7 days.  Call for an appointment.  [phone]


Follow up with ID Clinic in [facility] with  Dr Stone in 1 week


Family Medicine follow-up with Dr. Mallory in 1 week


Follow up with Cardiology Dr.Christopher Levy in 3-4 weeks








CONSULTANTS:


Ortho Dr. Moseley, ID Dr. Guevara





PROCEDURES:


s/p Surgery by Dr. Moseley 8/7/18


   1.  Right great toe partial amputation to the level of the mid proximal


   phalanx.


   2.  Removal of fifth toe nail plate.


   3.  Irrigation and debridement of the fifth toe nail bed.


   4.  Irrigation and debridement of right heel including skin, subcutaneous


   fat, and fascia.


Vital Signs:











  Date Time  Temp Pulse Resp B/P (MAP) Pulse Ox O2 Delivery O2 Flow Rate FiO2


 


8/19/18 09:13  62  125/63 (83)    


 


8/19/18 08:30     93 Room Air  


 


8/19/18 08:00 36.7 59 20 165/57 (93) 93 Room Air  


 


8/18/18 23:40      Room Air  


 


8/18/18 23:07 36.7 63 16 175/64 (101) 93 Room Air  


 


8/18/18 21:29      Room Air  


 


8/18/18 16:12      Room Air  


 


8/18/18 15:11 36.8 60 18 130/60 (83) 93 Room Air  


 


8/18/18 14:35      Room Air  


 


8/18/18 14:15 36.8 64 18 144/67 (92) 95 Room Air  


 


8/18/18 11:48 36.9 60 16 147/76 (99) 94 Room Air  








Lab Results:





Results Past 24 Hours








Test


  8/18/18


17:04 8/18/18


20:43 8/19/18


08:26 8/19/18


08:27 Range/Units


 


 


Bedside Glucose 172 79 48 50 70-90  mg/dl


 


Test


  8/19/18


08:53 


  


  


  Range/Units


 


 


Bedside Glucose 87    70-90  mg/dl

## 2018-08-18 NOTE — PHARMACY PROGRESS NOTE
Pharmacy Glycemic Short Note 2


Date of Service


Aug 18, 2018.





OUTPATIENT ANTIDIABETIC REGIMEN: 


* NovoLog SQ insulin pump


 * Basal Rate = 0.525 units/hr


 * Bolus per parameters:


 * Goal range = 110-150 mg/dl


 * CF = 60 mg/dl/unit


 * CR = 1 unit for every 18g CHO consumed


* HbA1c:  9.4%  (8/2/18)











Item Value  Date Time


 


Bedside Glucose 175 mg/dl H 8/17/18 0716


 


Bedside Glucose 235 mg/dl H 8/17/18 1128


 


Bedside Glucose 75 mg/dl 8/17/18 1628


 


Bedside Glucose 158 mg/dl H 8/17/18 2002


 


Bedside Glucose 212 mg/dl H 8/17/18 2357


 


Bedside Glucose 244 mg/dl H 8/18/18 0424


 


Bedside Glucose 216 mg/dl H 8/18/18 0733


 


Bedside Glucose 194 mg/dl H 8/18/18 1108











ASSESSMENT:





Please refer to previous progress notes for background information, in short:


* Glycemic control continues to fluctuate per patient is a brittle T1DM. 


* She continues to receive basal insulin via Novolog pump and bolus insulin via 

nursing administration


* Lunch BSG is consistently high and dinner BSG is consistently low, therefore 

I will tighten carb ratio given with breakfast ONLY in an attempt to avoid BSG 

elevation with lunch. If less correctional insulin is given with lunch, then 

perhaps her BSG at dinnertime will not continue to be below goal range. 








PLAN FOR INPATIENT GLYCEMIC CONTROL: 





* Basal insulin 


 * Continue Novolog SQ insulin pump (basal only)


 * Basal rate: 0.525 units/hr





* Bolus insulin:  nursing to administer SQ NovoLog for meal time coverage since 

patient cannot bolus herself from pump.


 * NovoLog per scale AC 


 * *** NO Novolog coverage at HS, as this tends to make patient hypoglycemic in 

the morning  (BSG should still be checked) ***


 * Goal Range:  Low 120 mg/dL - High 150 mg/dL


 


     With breakfast:


 * Correction Factor:  30 mg/dL/unit


 * Carb ratio of  1 unit per 7 grams CHO consumed


   With lunch and dinner:


 * Correction Factor:  30 mg/dL/unit


 * Carb ratio of  1 unit per 9 grams CHO consumed


      





DISCHARGE PLANNING: 





Updated 8/15:


* Patient's A1c (9.4%) indicates slightly suboptimal glycemic control.


* Expect that patient may resume home pump settings on discharge, as long as 

she will be able to operate her pump effectively.


 * Patient's daughter generally manages her pump.  


 * Uncertain at this time if patient will return home or go to Critical access hospital. 

Unsure that pump will be manageable at Critical access hospital?


* Expect that patient's basal rate is reasonable, but that she may require 

slightly tighter CF/CR parameters.  


* It does sound like patient experiences episodes of hypoglycemia at home (

mainly in the morning), so would continue to omit HS coverage.


-----------------------


* If patient is able to continue pump:


 * Continue insulin pump with basal rate only


 * Novolog bolus insulin with meals:


 * Novolog per sliding scale using CF of 30 mg/dL


 * Novolog 5-6 units with each meal (hold if meal is skipped)





* If patient is discharged to Critical access hospital and is NOT able to continue pump, 

consider:


 * Lantus 12 units SQ qAM


 * Novolog per sliding scale using CF of 30 mg/dL


 * Novolog 5-6 units with each meal (hold if meal is skipped)





**Please note: The above Novolog parameters are more aggressive than her 

outpatient pump settings because requirements have been more due to current 

infection.  These will need loosened over time.

## 2018-08-19 VITALS
DIASTOLIC BLOOD PRESSURE: 57 MMHG | TEMPERATURE: 98.06 F | SYSTOLIC BLOOD PRESSURE: 165 MMHG | OXYGEN SATURATION: 93 % | HEART RATE: 59 BPM

## 2018-08-19 VITALS
SYSTOLIC BLOOD PRESSURE: 125 MMHG | HEART RATE: 62 BPM | OXYGEN SATURATION: 93 % | DIASTOLIC BLOOD PRESSURE: 63 MMHG | TEMPERATURE: 98.06 F

## 2018-08-19 VITALS — HEART RATE: 62 BPM | DIASTOLIC BLOOD PRESSURE: 63 MMHG | SYSTOLIC BLOOD PRESSURE: 125 MMHG

## 2018-08-19 VITALS — OXYGEN SATURATION: 93 %

## 2018-08-19 RX ADMIN — Medication SCH MG: at 09:18

## 2018-08-19 RX ADMIN — DOCUSATE SODIUM SCH MG: 100 CAPSULE, LIQUID FILLED ORAL at 09:00

## 2018-08-19 RX ADMIN — PANTOPRAZOLE SCH MG: 40 TABLET, DELAYED RELEASE ORAL at 09:17

## 2018-08-19 RX ADMIN — DONEPEZIL HYDROCHLORIDE SCH MG: 10 TABLET, FILM COATED ORAL at 09:19

## 2018-08-19 RX ADMIN — METOPROLOL TARTRATE SCH MG: 50 TABLET, FILM COATED ORAL at 09:17

## 2018-08-19 RX ADMIN — INSULIN ASPART SCH EA: 100 INJECTION, SOLUTION INTRAVENOUS; SUBCUTANEOUS at 08:40

## 2018-08-19 RX ADMIN — LEVOTHYROXINE SODIUM SCH MCG: 88 TABLET ORAL at 05:42

## 2018-08-19 RX ADMIN — INSULIN ASPART SCH EA: 100 INJECTION, SOLUTION INTRAVENOUS; SUBCUTANEOUS at 12:59

## 2018-08-19 RX ADMIN — Medication SCH GM: at 09:00

## 2018-08-19 RX ADMIN — Medication SCH TAB: at 09:18

## 2018-08-19 RX ADMIN — CILOSTAZOL SCH MG: 100 TABLET ORAL at 09:16

## 2018-08-19 RX ADMIN — INSULIN ASPART SCH UNITS: 100 INJECTION, SOLUTION INTRAVENOUS; SUBCUTANEOUS at 08:30

## 2018-08-19 RX ADMIN — APIXABAN SCH MG: 5 TABLET, FILM COATED ORAL at 09:16

## 2018-08-19 RX ADMIN — INSULIN ASPART SCH UNITS: 100 INJECTION, SOLUTION INTRAVENOUS; SUBCUTANEOUS at 12:58

## 2018-08-19 RX ADMIN — AMLODIPINE BESYLATE SCH MG: 5 TABLET ORAL at 09:17

## 2018-08-19 RX ADMIN — CITALOPRAM HYDROBROMIDE SCH MG: 20 TABLET ORAL at 09:18

## 2018-08-19 RX ADMIN — MEMANTINE HYDROCHLORIDE SCH MG: 10 TABLET ORAL at 09:16

## 2018-08-19 RX ADMIN — Medication SCH MG: at 09:16

## 2018-08-19 NOTE — DISCHARGE SUMMARY
Discharge Summary


Date of Service


Aug 19, 2018.





Discharge Summary


Admission Date:


Aug 2, 2018 at 17:54


Discharge Date:  Aug 19, 2018


Principal Diagnosis:


 OSTEOMYELITIS OF RT GREAT TOE /5TH TOE S/P AMPUTATION /AFLUTTER


Procedures:


s/p Surgery by Dr. Moseley 8/7/18


   1.  Right great toe partial amputation to the level of the mid proximal


   phalanx.


   2.  Removal of fifth toe nail plate.


   3.  Irrigation and debridement of the fifth toe nail bed.


   4.  Irrigation and debridement of right heel including skin, subcutaneous


   fat, and fascia.





CT OF THE CHEST WITHOUT IV CONTRAST





CLINICAL HISTORY: Pneumonia versus lung mass.    





COMPARISON STUDY:  Chest radiograph August 6, 2018. 





CT DOSE: 207.46 mGycm





TECHNIQUE:  Axial images of the chest were obtained without IV contrast.  Images


were reviewed in the axial, sagittal, and coronal planes. IV contrast was not


administered for this examination.  A dose lowering technique was utilized


adhering to the principles of ALARA.








FINDINGS:  No enlarged axillary, mediastinal or hilar lymph nodes are present.


Heart is moderately enlarged. There are median sternotomy wires and postsurgical


findings consistent with bypass grafting. There is no pericardial effusion. No


pneumothorax or pleural effusion is noted. Lingular opacity is noted. There is


also a 2.3 x 1.4 cm nodular right middle lobe opacity shown on axial image 200


of 326. There are numerous pulmonary nodules, many of which have irregular


margins. These include a 1 cm right upper lobe nodule shown on image 117 and an


8 mm left upper lobe nodule shown on image 125. No cavitary lesions are present.


Bony thorax is unremarkable. Visualized portions of the upper abdomen


demonstrate extensive atherosclerotic calcification.





IMPRESSION:  





1. Right middle lobe and lingular opacities. The findings may reflect an


infectious etiology such as atypical mycobacterial infection. A 2.3 x 1.4 cm


nodular right middle lobe opacity is indeterminate. Although an


infectious/inflammatory process is favored, a neoplastic process could appear


similar and a follow-up chest CT in one month is recommended. Alternately,


bronchoscopy could be performed.





2. Numerous ill-defined nodules throughout the lungs. An infectious/inflammatory


process is favored however metastatic disease could appear similar. These


nodules should be assessed on follow-up chest CT.





3. No thoracic lymphadenopathy.





4. Moderate cardiomegaly and extensive coronary artery calcification.


Consultations:


Ortho Dr. Moseley, ID Dr. Guevara





Medication Reconciliation


New Medications:  


Amlodipine Besylate (Amlodipine Besylate) 5 Mg Tab


5 MG PO DAILY for 30 Days, #30 TAB





Insulin Aspart (Novolog Flexpen) 100 Units/Ml Inj


0 UNITS SC AC for 30 Days


`GOAL RANGE = Low 100 MG/DL  High 150 MG/DL


  Correction Factor = 40 MG/DL/UNIT


  INS:CHO RATIO= 1 UNIT PER 13 GRAM CHO CONSUMED


 


 ***If patient NPO, do not hold correction factor insulin without an


 order.


 **LOOK ALIKE SOUND ALIKE**


Metoprolol Tartrate (Lopressor) (Lopressor) 50 Mg Tab


50 MG PO BID for 30 Days, #60 TAB





Tramadol HCl (Tramadol HCl) 50 Mg Tab


50 MG PO Q6H PRN for Pain for 7 Days, #10 TAB





 


Changed Medications:  


Acetaminophen (Tylenol) 500 Mg Tab


1000 MG PO Q8H PRN for Pain for 7 Days (Changed from: Q6)





 


Continued Medications:  


Apixaban (Eliquis) 5 Mg Tab


5 MG PO BID





Aspirin (Aspirin Ec) 81 Mg Tab


81 MG PO DAILY





Calcium Carbonate-Vitamin D (Calcium + D) 1 Tab Tab


1 TAB PO DAILY





Cilostazol (Pletal) 100 Mg Tab


1 TAB PO BID





Citalopram Hydrobromide (Celexa) 20 Mg Tab


20 MG PO DAILY





Docusate Sodium (Colace) 100 Mg Cap


1 CAP PO BID





Donepezil Hydrochloride (Aricept) 10 Mg Tab


10 MG PO DAILY





Furosemide (Lasix) 40 Mg Tab


1 TAB PO DAILY for 30 Days, #30 TAB 5 Refills





Insulin Aspart (novoLOG INSULIN PUMP ) 1 Ea Inj


1 EA N/A UD





Levothyroxine Sodium (Synthroid) 88 Mcg Tab


88 MCG PO DAILY





Memantine Hcl (Namenda) 10 Mg Tab


10 MG PO BID





Mirtazapine (Remeron) 15 Mg Tab


0.5 TAB PO HS for 30 Days, #30 TAB 3 Refills





Multiple Vitamin (Multivitamin) 1 Tab Tab


1 TAB PO DAILY





Pantoprazole (Protonix) 40 Mg Tab


40 MG PO DAILY





Polyethylene Glycol 3350 (Miralax) 1 Pow Pow


17 GM PO DAILY





Rosuvastatin Calcium (Crestor) 10 Mg Tab


20





 


Discontinued Medications:  


Amlodipine (Norvasc) 2.5 Mg Tab


2.5 MG PO DAILY, TAB





Metoprolol Tartrate (Lopressor) 25 Mg Tab


25 MG PO BID, TAB





Sulfa/Trimethoprim (Bactrim Ds 800MG/160MG)  Tab


1 TAB PO BID for 30 Days, #60 TAB 3 Refills











Referrals At Discharge


Follow up Referrals:  


Cardiologist Referral - Please Call For Appointment with Benny Mobley MD


Infectious Disease - Within 1-2 Weeks with Jamal Stone MD


Orthopedics Referral - Within 1 Week with Farhat Moseley D.O.


Physician Referral - 08/22/18 with Jhonathan Mallory M.D.








Admission Information


HPI (per Admitting provider):


This is a 73-year-old female with a significant past medical history of type 1 

insulin-dependent diabetes mellitus, CAD status post prior CABG, PAD status 

post recent revascularization, hypothyroidism, hypertension, hyperlipidemia, PAF

, diabetic polyneuropathy, GERD, CKD stage III who presents to [facility] where she was referred from ID Dr. Stone secondary to MDR wound 

culture requiring IV antibiotics.  Patient was initially hospitalized in March 2018 requiring complex fem-tibial bypass.  Most recently hospitalized on 7/26/ 2018 due to bilateral lower extremity angiogram with right SFA/popliteal 

angioplasty with drug-eluting balloon by Dr. Mobley which was successful.  She 

has chronic right heel wound, great toe necrosis which warranted 

revascularization prior to possible further procedures per daughter.  She 

follows closely with infectious disease and wound clinic.  Wound culture of 

right heel grew positive MRSA, multidrug resistant Pseudomonas requiring IV 

antibiotic therapy, therefore she is referred for admission.  Daughter is 

present, provides most of history.  Currently patient denies pain, fever, chills

, sweats, chest pain, shortness of breath, dizziness, lightheadedness, nausea, 

vomiting, diarrhea, UTI sx.  Overall good appetite, in which she takes remeron 

for.  Per daughter patient has been having elevated blood sugars secondary to 

frequent hospitalizations, infections.  She states "I just think she needs this 

toe off and be done with it."  In ED initial evaluation revealed WBC 7.15, 

hemoglobin 10.4, hematocrit 31.9, platelets 264, sodium 133, potassium 4.8, 

chloride 96, BUN/creatinine 25, 1.10, glucose 386.  She was given a dose of IV 

Vanco, IV gentamicin, 10 units of IV regular insulin secondary to 

hyperglycemia.  ESR, CRP elevated at 79 and 4.8 respectively.  Her lactic acid 

was 1.5.  CT scan right foot revealed soft tissue edema, no evidence of 

osteomyelitis.  She is being admitted for IV antibiotic therapy.


Physical Exam (per Admitting):  


   General Appearance:  no apparent distress, + thin (F, appears age, lying in 

bed)


   Head:  normocephalic, atraumatic


   Eyes:  normal inspection, PERRL


   ENT:  normal ENT inspection, hearing grossly normal, + pertinent finding (

Mucous membranes moist)


   Neck:  supple, no adenopathy


   Respiratory/Chest:  chest non-tender, lungs clear, normal breath sounds, no 

respiratory distress, no accessory muscle use


   Cardiovascular:  regular rate, rhythm, + systolic murmur (2/6 holosystolic 

heard best at apex, radiates to carotid), + abnormal peripheral pulses (

Diminished pedal pulses and PT pulses R>L, )


   Abdomen/GI:  normal bowel sounds, non tender, soft, no organomegaly


   Genitourinary - Female:  + pertinent finding (Left inginual scar from skin/

muscle graft healed; right inguinal vascular access site, CDI no erythema)


   Back:  normal inspection, normal range of motion


   Extremities/Musculoskelatal:  + pedal edema (Right with warmth to touch, 

mild erythema), + pertinent finding (+Right heel ulcer with treatment intact, R 

necrotic great toe down to base, with minimal serosanginous drainage at base.)


   Neurologic/Psych:  alert, normal mood/affect, oriented x 3 (to basics only)


   Skin:  normal color, no rash





Hospital Course





(1) Cellulitis of right foot


(2) Non-healing wound of right heel


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


(4) Severe peripheral arterial disease


(5) Type 1 diabetes mellitus


(6) Hypoalbuminemia


(7) CAD (coronary artery disease)


(8) Paroxysmal atrial fibrillation


(9) Hypertension


(10) Hyperlipidemia


(11) Hypothyroid


(12) Atherosclerotic dementia


(13) Type 1 DM with CKD stage 3 and hypertension


(14) Diabetic peripheral neuropathy associated with type 1 diabetes mellitus


(15) GERD (gastroesophageal reflux disease)


(16) Hx MRSA infection


feels fine , 


no complain of pain or discomfort 


no fever or chills 





PHYSICAL EXAM : 


General-very pleasant, elderly female, no sign of distress


Eyes-sclera nonicteric, pupils bilateral equal reactive light extraocular 

muscle intact


ENT-moist oral mucosa, hearing grossly normal


Neck-supple, no JVD, no carotid bruit, no thalamic


Lungs-clear to auscultate, no wheeze or rales


Heart-regular S1 and S2 no murmur gallop


Abdomen-soft nontender, no organomegaly


Extremities-status post right great toe amputation, dressing/protective boot  

present, no drainage noted, 


Neuro-alert awake oriented 3, no focal neurological deficit








Last 8 Hrs








  Date Time  Temp Pulse Resp B/P (MAP) Pulse Ox O2 Delivery O2 Flow Rate FiO2


 


8/19/18 09:13  62  125/63 (83)    


 


8/19/18 08:30     93 Room Air  


 


8/19/18 08:00 36.7 59 20 165/57 (93) 93 Room Air  




















ASSESSMENT AND PLAN : 





CELLULITIS OF RIGHT FOOT/OSTEOMYELITIS GANGRENE OF RIGHT GREAT TOE/RT FIFTH TOE 





S/P Right great toe partial amputation; Removal of fifth toe nail plate and 

debridement of of the fifth toe nail bed & right heel


 by Dr. Moseley on 8/7/18


Appreciate Orthopedics Help 


Recommendations per Ortho:


   Daily dressing changes to the right foot.  Keep an eye on her heel as well-

for possible skin breakdown


   Weightbearing on the heel only with protective shoe.


   Ok to shower if you keep the dressing covered with a waterproof covering.


   Call if you have increased temp of 101.5 or greater, increased redness, 

increased swelling or drainage.  


   Follow up with Dr Moseley in 7 days.  Call for an appointment.  





Outpatient cultures grew MRSA and Pseudomonas.


 completed IV Dapto + Ceftolozane/Tazobactam 14 days treatment


Appreciate ID Input Dr. Guevara


Follow up with ID Clinic in [facility] with Dr. Stone in 1 week from 

discharge





CAD S/P CABG 


No anginal symptoms.


Continue aspirin, metoprolol, amlodipine.








ATRIAL FLUTTER S/P SUCCESSFUL CARDIOVERSION


Continue Eliquis for paroxysmal afib 


Metoprolol dose increased to 50mg BID for better rate control  


Appreciate Cardiology Input


Needs follow up with Dr.Christopher Mobley in 3-4 weeks











PULMONARY NODULES, RIGHT MIDDLE LOBE INFILTRATE


no respiratory symptoms


per patient's daughter Rupinder, patient is following with [facility] in [facility]  


Pulmonary consulted, recommend outpatient follow up


repeat CT chest with contrast in 1 month 


 





HTN:


Blood pressure stable


 Amlodipine dose increased from 2.5 to 5mg po daily


Continue metoprolol


monitor








PERIPHERAL VASCULAR DISEASE


Interventional Cardiology consulted


S/P  -  SFA/popliteal PTA


continue Pletal


out pt follow up with Dr Mobley 








DM TYPE 1


on Insulin pump.


Pharmacy consulted for glycemic management


BSGs elevated


Novolog Sliding scale being adjusted


continue to monitor for Hypoglycemic episodes








DYSLIPIDEMIA


Was kept on hold during treatment with daptomycin


Will resume statin  as daptomycin is discontinued











DVT Px:


on Eliquis 


 





Code Status:


DNR








EPISODES OF DESATURATION AT NIGHT/POSSIBLE OBSTRUCTIVE SLEEP APNEA


-Patient was reportedly found to be hypoxic 80% at night


-Required 2 L oxygen by nasal cannula


-Daytime patient remains in room air with 95% oxygenation


-Possible's sleep apnea


-Nocturnal pulse oximetry-shows multiple episodes of desaturation between 80-89

% in RA 


-arrangements made for 2 L o2 at night 


-Will benefit with outpatient sleep study to assess possible sleep apnea, and 

treatment with CPAP











DISPOSITION


stable to be discharged home with home health today 





Follow up with Dr Moseley in 7 days.  Call for an appointment.  [phone]


Follow up with ID Clinic in [facility] with  Dr Stone in 1 week


Family Medicine follow-up with Dr. Mallory in 1 week


Follow up with Cardiology Dr.Christopher Mobley in 3-4 weeks








CONSULTANTS:


Ortho Dr. Moseley, ID Dr. Guevara





PROCEDURES:


s/p Surgery by Dr. Moseley 8/7/18


   1.  Right great toe partial amputation to the level of the mid proximal


   phalanx.


   2.  Removal of fifth toe nail plate.


   3.  Irrigation and debridement of the fifth toe nail bed.


   4.  Irrigation and debridement of right heel including skin, subcutaneous


   fat, and fascia.


Total time spent on discharge = 40 MINS 


This includes examination of the patient, discharge planning, medication 

reconciliation, and communication with other providers.





Discharge Instructions


 DI: Medical v5 


Discharge Instructions


Date of Service


Aug 18, 2018.





Admission


Reason for Admission:  Cellulitis Of R Foot, Chronic Ulcer Of Great R Toe





Discharge


Discharge Diagnosis / Problem:   OSTEOMYELITIS OF RT GREAT TOE /5TH TOE S/P 

AMPUTATION /AFLUTTER 





Discharge Goals


Goal(s):  Decrease discomfort, Improve function, Increase independence, Improve 

disease control, Diagnostic testing, Therapeutic intervention





Activity Recommendations


Activity Limitations:  as noted below





.





Instructions / Follow-Up


Instructions / Follow-Up


HOSPITAL FOLLOW UP : 8/22/2018 1:00 PM Dr Jhonathan Mallory MD [facility] [place]





CONTINUE TO USE 2 L OXYGEN AT NIGHT 


WILL NEED REFERRAL FOR SLEEP STUDY TO ASSESS FOR OBSTRUCTIVE SLEEP APNEA /CPAP 








CT CHEST WITH CONTRAST 





1. Right middle lobe and lingular opacities. The findings may reflect an


infectious etiology such as atypical mycobacterial infection. A 2.3 x 1.4 cm


nodular right middle lobe opacity is indeterminate. Although an


infectious/inflammatory process is favored, a neoplastic process could appear


similar and a follow-up chest CT in one month is recommended. Alternately,


bronchoscopy could be performed.





2. Numerous ill-defined nodules throughout the lungs. An infectious/inflammatory


process is favored however metastatic disease could appear similar. These


nodules should be assessed on follow-up chest CT.








INCIDENTAL FINDING OF PULMONARY NODULE 





REPEAT CT CHEST WITH CONTRAST IN 1 MONTHS 





FOLLOW UP WITH PULMONOLOGY AT [facility] 





FOLLOW UP WITH DR. MOSELEY IN 7 DAYS.  CALL FOR AN APPOINTMENT.  [phone]


FOLLOW UP WITH ID CLINIC IN [facility] WITH  DR. STONE IN 1 WEEK


FOLLOW UP WITH CARDIOLOGY DR.CHRISTOPHER MOBLEY IN 3-4 WEEKS





Current Hospital Diet


Patient's current hospital diet: AHA Diet (Heart Healthy), Diabetes Type 2 Diet





Discharge Diet


Recommended Diet:  AHA Diet (Heart Healthy), Diabetes Type 1 Diet





Procedures


Procedures Performed:  


1. Debridement of nail bed of right 5th toe; 


2. Partial Amputation of right great toe; 


3. Irrigation and Debridement of right heel ulcer including skin,


subcutaneous fat


& fascia


4. Removal nail plate 5th toe





Pending Studies


Studies pending at discharge:  no





Laboratory Results





Hemoglobin A1c








Test


  8/2/18


15:45 Range/Units


 


 


Estimated Average Glucose 217   mg/dl


 


Hemoglobin A1c 9.2 H 4.5-5.6  %











Medical Emergencies








.


Who to Call and When:





Medical Emergencies:  If at any time you feel your situation is an emergency, 

please call 911 immediately.





.





Non-Emergent Contact


Non-Emergency issues call your:  Primary Care Provider





.


.








"Provider Documentation" section prepared by Isa Lizarraga.








.





Consultant Recommendations


Consultant Recommendations:


Daily dressing changes to the right foot.  Keep an eye on her heel as well. (

breakdown?)


Weightbearing on the heel only with protective shoe


Ok to shower if you keep the dressing covered with a waterproof covering


Call if you have increased temp of 101.5 or greater, increased redness, 

increased swelling or drainage.  [phone]








Follow up with Dr Moseley in 14 days from the day of surgery.  Call for an 

appointment.  [phone]





Additional Copies To


Jhonathan Mallory M.D., Christopher R., MD

## 2022-10-03 NOTE — ORTHOPEDIC PROGRESS NOTE
Orthopedic Progress Note


Date of Service


Aug 8, 2018.





Subjective


Post OP Day:  1


Reports: feeling well, pain controlled w PO medications, Denies: complaints, 

chest pain, SOB, nausea / vomiting, light headedness, calf pain





Objective


calves soft nontender, N/V intact, capillary refill less than 2 sec., dressing C

/D/I, A&O x3, toes mobile











  Date Time  Temp Pulse Resp B/P (MAP) Pulse Ox O2 Delivery O2 Flow Rate FiO2


 


8/8/18 09:15    153/55 (87)    


 


8/8/18 07:45 36.6 66 17 172/55 (94) 96 Room Air  


 


8/8/18 03:31 36.4 63 16 116/56 (76) 94 Room Air  


 


8/7/18 23:15 36.5 63 16 127/50 (75) 99 Room Air  


 


8/7/18 23:15      Room Air  


 


8/7/18 22:08 36.3 66 17 133/82 (99) 100 Room Air  


 


8/7/18 21:11 36.4 66 16 155/59 (91) 98 Room Air  


 


8/7/18 21:01 36.4 65 16 161/55 (90) 99 Room Air  


 


8/7/18 20:30      Room Air  


 


8/7/18 20:26 36.3 64 17 172/55 (94) 97 Room Air  


 


8/7/18 19:57  64 16  98   


 


8/7/18 19:57  64 16     


 


8/7/18 19:56    164/59    


 


8/7/18 19:52  62 15  98   


 


8/7/18 19:52  62 15     


 


8/7/18 19:51    184/61    


 


8/7/18 19:49 36.4    99 Room Air  


 


8/7/18 19:47  64 18  98   


 


8/7/18 19:47  63 18     


 


8/7/18 19:46    175/60    


 


8/7/18 19:42  64 16     


 


8/7/18 19:42  67 16  98   


 


8/7/18 19:41    183/64    


 


8/7/18 19:37  61 12     


 


8/7/18 19:37  61 12  99   


 


8/7/18 19:36    202/66    


 


8/7/18 19:32  62 12  96   


 


8/7/18 19:32  62 12     


 


8/7/18 19:31    192/73    


 


8/7/18 19:29  62 18     


 


8/7/18 19:29  62 18  96   


 


8/7/18 19:26    192/69    


 


8/7/18 19:24  61 16     


 


8/7/18 19:24  61 16  98   


 


8/7/18 19:23  62 14  96   


 


8/7/18 19:23  62 14     


 


8/7/18 19:21    192/67    


 


8/7/18 19:18  63 12     


 


8/7/18 19:18  63 12  96   


 


8/7/18 19:16    199/67    


 


8/7/18 19:13  62 11     


 


8/7/18 19:13  62 11  96   


 


8/7/18 19:12    188/91    


 


8/7/18 19:08  63 15     


 


8/7/18 19:08  63 15  97   


 


8/7/18 19:07  61 14     


 


8/7/18 19:07  65 14  97   


 


8/7/18 19:06    212/72    


 


8/7/18 19:02  63 10     


 


8/7/18 19:02  62 10 205/73 98   


 


8/7/18 19:01    209/74    


 


8/7/18 18:57  63 10  99   


 


8/7/18 18:57  63 10     


 


8/7/18 18:56    191/69    


 


8/7/18 18:52 36.2 61 16 184/70 99 Oxymask 10 


 


8/7/18 18:52  61 16 184/70 100   


 


8/7/18 18:52  65 16     


 


8/7/18 15:45      Room Air  


 


8/7/18 15:34 36.6 60 17 134/68 (90) 95 Room Air  


 


8/7/18 11:10 36.7 55 14 159/61 (93) 92 Room Air  











Assessment & Plan


Assessment:


POD #1:


1.  Right great toe partial amputation to the level of the mid proximal


phalanx.


2.  Removal of fifth toe nail plate.


3.  Irrigation and debridement of the fifth toe nail bed.


4.  Irrigation and debridement of right heel including skin, subcutaneous


fat, and fascia.


Plan:


PT/ OT  heel weight bearing only with post op shoe


Dressing change tomorrow


DVT proph- ASA, Eliquis


Daptomycin currently


D/C planning- Home


As per primary srervice.








Inhouse Planning


Pain Management:  Ultram, Morphine


DVT Prophylaxis:  SCDs, ASA





Discharge Planning


Discharge Planning:  home
Orthopedic Progress Note


Date of Service


Aug 9, 2018.





Subjective


Post OP Day:  2


Reports: feeling well, Denies: complaints





Objective


Dressings removed.  Wound benign.  All of packing removed.  No overt drainage.  

No erythema.  Redressed.  Noted area on heel with soft area.  No bruising.  

Skin mildly mottled.  No drainage.  Protective dressing placed on heel as well.











  Date Time  Temp Pulse Resp B/P (MAP) Pulse Ox O2 Delivery O2 Flow Rate FiO2


 


8/9/18 11:35 36.3 75 17 123/76 (92) 95 Room Air  


 


8/9/18 08:56     92 Room Air  


 


8/9/18 08:11      Room Air  


 


8/9/18 07:38 36.9 68 17 161/58 (92) 92 Room Air  


 


8/9/18 00:28  68  161/61 (94)    


 


8/9/18 00:05 37.1 67 16 166/59 (94) 90 Room Air  


 


8/8/18 23:40      Room Air  


 


8/8/18 16:20     98 Room Air  


 


8/8/18 15:30 36.4 57 16 138/61 (86) 98 Room Air  








Laboratory Results 24 Hours:











Test


  8/9/18


06:52


 


White Blood Count 7.05 K/uL 


 


Red Blood Count 3.41 M/uL 


 


Hemoglobin 9.9 g/dL 


 


Hematocrit 31.2 % 


 


Mean Corpuscular Volume 91.5 fL 


 


Mean Corpuscular Hemoglobin 29.0 pg 


 


Mean Corpuscular Hemoglobin


Concent 31.7 g/dl 


 


 


Platelet Count 322 K/uL 


 


Mean Platelet Volume 9.6 fL 


 


Neutrophils (%) (Auto) 62.6 % 


 


Lymphocytes (%) (Auto) 25.7 % 


 


Monocytes (%) (Auto) 8.5 % 


 


Eosinophils (%) (Auto) 2.8 % 


 


Basophils (%) (Auto) 0.3 % 


 


Neutrophils # (Auto) 4.41 K/uL 


 


Lymphocytes # (Auto) 1.81 K/uL 


 


Monocytes # (Auto) 0.60 K/uL 


 


Eosinophils # (Auto) 0.20 K/uL 


 


Basophils # (Auto) 0.02 K/uL 











Assessment & Plan


Assessment:


POD #2


1.  Right great toe partial amputation to the level of the mid proximal


phalanx.


2.  Removal of fifth toe nail plate.


3.  Irrigation and debridement of the fifth toe nail bed.


4.  Irrigation and debridement of right heel including skin, subcutaneous


fat, and fascia.


Plan:


PT/ OT  heel weight bearing only with post op shoe


Daily dressing changes


DVT proph- ASA, Eliquis


Daptomycin currently - 7 days of IV antibx per Dr Guevara


D/C planning- [facility]


As per primary srervice.





NO FURTHER SURGERY NEEDED.  ORTHOPEDICALLY STABLE.  ORTHO WILL SIGN OFF FOR 

NOW.  PLEASE CALL WITH ANY QUESTIONS.








Inhouse Planning


Pain Management:  Ultram, Morphine


DVT Prophylaxis:  SCDs, ASA, other (ELIQUIS)





Discharge Planning


Discharge Planning:  rehab hospital
As certified below, I, or a nurse practitioner or physician assistant working with me, had a face-to-face encounter that meets the physician face-to-face encounter requirements.

## 2023-04-05 NOTE — PROGRESS NOTE
[facility]    Phone Message    May a detailed message be left on voicemail: yes     Reason for Call: Other: The patient called back and no answer on priority line, pt asked for a call back to discuss symptoms     Action Taken: Other: Cardiology    Travel Screening: Not Applicable     Thank you!  [facility]                                                                           Medicine Progress Note


Date & Time of Visit:


Aug 7, 2018 at 10:35.


Subjective


seen resting in bed, comfortable


denies dyspnea, cough, hemoptysis, night sweats, weight loss


no chest pain, palpitations, dizziness


no pain


no other symptoms


states she feels fine overall





no other symptoms





Objective





Last 8 Hrs








  Date Time  Temp Pulse Resp B/P (MAP) Pulse Ox O2 Delivery O2 Flow Rate FiO2


 


8/7/18 09:15     93 Room Air  


 


8/7/18 07:45 37.1 68 16 153/58 (89) 93 Room Air  








Physical Exam:





General- oriented x 2, not in distress, speaks in sentences with no effort





Head-  atraumatic





Eyes- anicteric





ENT- oropharynx clear





Neck- no JVD





Lungs- clear BS bilaterally, no rales/wheezes





Heart- regular rhythm; no murmur, normal rate 





Abdomen- normal bowel sounds, soft, nontender





Extremities- right lower leg: (+) gangrene of right big toe, 5th toe - distal 

phalanx


left: no pretibial edema, no calf tenderness





Neuro- alert, oriented x 2


no gross focal neuro deficits





Skin- warm & dry


Laboratory Results:





Last 24 Hours








Test


  8/6/18


12:03 8/6/18


17:38 8/6/18


20:43 8/7/18


08:08


 


Bedside Glucose 320 mg/dl  181 mg/dl  101 mg/dl  137 mg/dl 











Assessment & Plan





CELLULITIS RIGHT FOOT / GANGRENE RIGHT GREAT TOE, 5TH TOE


Outpatient cultures grew MRSA and Pseudomonas.


on Dapto + Ceftolozane/Tazobactam


ID consulted





stable overall


for Amputation today





CAD


No anginal symptoms.


Continue aspirin, metoprolol, amlodipine.


- hold ASA and Pletal today


  resume as soon as possible when hemostasis stable per Ortho


- hold Lasix to avoid dehydration





AF


Continue metoprolol 


- hold Eliquis today


  resume as soon as possible when hemostasis stable per Ortho





PULMONARY NODULES, RIGHT MIDDLE LOBE INFILTRATE


no respiratory symptoms


per patient's daughter Rupinder, patient is following with [facility] in [facility] for this 


will obtain previous records, last CT chest films


Pulmonary consulted





HYPERTENSION


Continue metoprolol and amlodipine.





PERIPHERAL VASCULAR DISEASE


Interventional Cardiology consulted





DM TYPE 1


Insulin pump.


Blood sugars typically fluctuate.


Pharmacy consulted.





DYSLIPIDEMIA


Hold statin while receiving daptomycin.





DEMENTIA


Monitor for delirium.





MRSA


Contact precautions.





VTE PROPHYLAXIS


- hold Eliquis today


  resume as soon as possible when hemostasis stable per Ortho





RESUSCITATION STATUS


DNR





DISPOSITION


anticipate transition to Rehab/SNF after toe amputation


Family Medicine follow-up with Dr. Mallory.


Current Inpatient Medications:





Current Inpatient Medications








 Medications


  (Trade)  Dose


 Ordered  Sig/Amparo


 Route  Start Time


 Stop Time Status Last Admin


Dose Admin


 


 Acetaminophen


  (Tylenol Tab)  1,000 mg  Q6  PRN


 PO  8/2/18 18:00


 9/1/18 17:59   


 


 


 Apixaban


  (Eliquis)  5 mg  BID


 PO  8/2/18 21:00


 9/1/18 20:59 Future hold 8/6/18 20:30


5 MG


 


 Aspirin


  (Ecotrin Tab)  81 mg  DAILY


 PO  8/3/18 09:00


 9/2/18 08:59 Future hold 8/5/18 09:40


81 MG


 


 Cilostazol


  (Pletal Tab)  100 mg  BID


 PO  8/2/18 21:00


 9/1/18 20:59 Future hold 8/6/18 20:30


100 MG


 


 Citalopram


 Hydrobromide


  (celeXA TAB)  20 mg  DAILY


 PO  8/3/18 09:00


 9/2/18 08:59  8/7/18 08:46


20 MG


 


 Docusate Sodium


  (coLACE CAP)  100 mg  BID


 PO  8/2/18 21:00


 9/1/18 20:59  8/7/18 08:47


100 MG


 


 Donepezil HCl


  (Aricept Tab)  10 mg  DAILY


 PO  8/3/18 09:00


 9/2/18 08:59  8/7/18 08:45


10 MG


 


 Levothyroxine


 Sodium


  (Synthroid Tab)  88 mcg  DAILYBB


 PO  8/3/18 06:00


 9/2/18 06:59  8/7/18 06:11


88 MCG


 


 Memantine


  (Namenda Tab)  10 mg  BID


 PO  8/2/18 21:00


 9/1/18 20:59  8/7/18 08:51


10 MG


 


 Multivitamins


  (Multivitamin


 Tab)  1 tab  DAILY


 PO  8/3/18 09:00


 9/2/18 08:59  8/7/18 08:50


1 TAB


 


 Pantoprazole


 Sodium


  (Protonix Tab)  40 mg  DAILY


 PO  8/3/18 09:00


 9/2/18 08:59  8/7/18 08:52


40 MG


 


 Furosemide


  (Lasix Tab)  40 mg  DAILY


 PO  8/3/18 09:00


 9/2/18 08:59  8/7/18 08:48


40 MG


 


 Metoprolol


 Tartrate


  (Lopressor Tab)  25 mg  BID


 PO  8/2/18 21:00


 9/1/18 20:59  8/7/18 08:49


25 MG


 


 Mirtazapine


  (Remeron Tab)  7.5 mg  HS


 PO  8/2/18 21:00


 9/1/18 20:59  8/6/18 20:30


7.5 MG


 


 Calcium/Vitamin D


  (Caltrate Plus


 Tab)  1 tab  DAILY


 PO  8/3/18 09:00


 9/2/18 08:59  8/7/18 08:45


1 TAB


 


 Polyethylene


  (Miralax Powder


 Packet)  17 gm  DAILY


 PO  8/3/18 09:00


 9/2/18 08:59 Future hold 8/6/18 08:23


17 GM


 


 Miscellaneous


 Information


  (Consult


 Glycemic


 Management


 Pharmacy)  1 ea  UD  PRN


 N/A  8/2/18 19:42


 9/1/18 19:41   


 


 


 Amlodipine


 Besylate


  (Norvasc Tab)  2.5 mg  DAILY


 PO  8/3/18 09:00


 9/2/18 08:59  8/7/18 08:51


2.5 MG


 


 Daptomycin


  (Consult)  1 ea  UD  PRN


 N/A  8/2/18 20:15


 9/1/18 20:14   


 


 


 Daptomycin 400 mg/


 Syringe  8 ml @ 4


 mls/min  Q24H


 IV  8/2/18 22:00


 9/13/18 21:59  8/6/18 21:32


4 MLS/MIN


 


 Insulin Aspart


  (novoLOG INSULIN


 PUMP)  1 ea  ACHS


 N/A  8/2/18 21:00


 9/1/18 20:59 Future hold 8/7/18 09:09


1 EA


 


 Insulin Aspart


  (novoLOG ASPART)  SLIDING


 SCALE  PRN  PRN


 SC  8/2/18 20:45


 9/1/18 20:44 Future hold  


 


 


 Glucose


  (Glucose 40% Gel)  15-30


 GRAMS 15


 GRAMS...  UD  PRN


 PO  8/2/18 20:45


 9/1/18 20:44   


 


 


 Glucose


  (Glucose Chew


 Tab)  4-8


 Tablets 4


 Tabl...  UD  PRN


 PO  8/2/18 20:45


 9/1/18 20:44   


 


 


 Glucagon


  (Glucagon Inj)  1 mg  UD  PRN


 IM  8/2/18 20:45


 9/1/18 20:44   


 


 


 Dextrose


  (Dextrose 50%


 50ML Syringe)  25-50ML


 25ML FOR


 ...  UD  PRN


 IV  8/2/18 20:45


 9/1/18 20:44   


 


 


 Carbohydrates


  (Carbohydrates


 For Hypoglycemia)  15-30 GRAMS


 15 grams if


 BSG 54-69...  UD  PRN


 PO  8/2/18 20:45


 9/1/18 20:44  8/5/18 04:23


15 GM


 


 Enteral


 Nutritional


 Formula


  (Boost Glucose


 Control)  1 can  BIDM


 PO  8/3/18 17:45


 9/2/18 17:44 Future hold 8/6/18 17:45


1 CAN


 


 Ceftolozane/


 Tazobactam 1.5 gm/


 Dextrose  111.4 ml @ 


 111.4 mls/


 hr  Q8H


 IV  8/3/18 16:00


 8/13/18 15:59  8/7/18 08:13


111.4 MLS/HR


 


 Diphenhydramine


 HCl


  (Benadryl Cap)  25 mg  BID  PRN


 PO  8/3/18 16:30


 9/2/18 16:29   


 


 


 Insulin Aspart


  (novoLOG ASPART)  **SLIDING


 SCALE**


 **G...  AC


 SC  8/6/18 18:00


 9/5/18 17:59  8/6/18 18:19


6 UNITS